# Patient Record
Sex: FEMALE | Race: WHITE
[De-identification: names, ages, dates, MRNs, and addresses within clinical notes are randomized per-mention and may not be internally consistent; named-entity substitution may affect disease eponyms.]

---

## 2017-10-24 ENCOUNTER — HOSPITAL ENCOUNTER (OUTPATIENT)
Dept: HOSPITAL 22 - LAB | Age: 62
End: 2017-10-24
Payer: COMMERCIAL

## 2017-10-24 DIAGNOSIS — M25.361: ICD-10-CM

## 2017-10-24 DIAGNOSIS — M15.4: ICD-10-CM

## 2017-10-24 DIAGNOSIS — M25.552: ICD-10-CM

## 2017-10-24 DIAGNOSIS — I10: Primary | ICD-10-CM

## 2017-10-24 LAB
BASOPHILS # BLD AUTO: 1.6 K/MM3 (ref 0.7–4.5)
BUN: 14 MG/DL (ref 7–18)
EOSINOPHIL NFR BLD AUTO: 22.2 % (ref 10–50)
GFR SERPLBLD BASED ON 1.73 SQ M-ARVRAT: 101 ML/MIN (ref 59–?)
HCT VFR BLD CALC: 15.2 G/DL (ref 12.2–16.2)

## 2017-10-25 NOTE — RADIOLOGY REPORT PS360
HIP LT 2-3V W/PELVIS IF PERFOR
 
HISTORY:    
LEFT HIP PAIN, INSTABILITY
ORDERING PHYSICIAN: Delphine VILLARRELA
PATIENT AGE:  62 years
 
 
COMPARISON: None
 
FINDINGS: No fracture or dislocation is evident. No significant degenerative
change. No lytic or blastic change. Unremarkable soft tissues. Degenerative disc
disease is present at L4-L5.
 
IMPRESSION:
1. Negative left hip.
2. Degenerative disc disease L4-L5

## 2017-10-25 NOTE — RADIOLOGY REPORT PS360
KNEE-3 VIEWS-RT***************
 
HISTORY:    
INSTABILITY OF RIGHT KNEE
ORDERING PHYSICIAN: Delphine VILLARREAL
PATIENT AGE:  62 years
 
 
COMPARISON: None
 
FINDINGS:    
 
No fracture or dislocation. No lytic or blastic change. Normal mineralization.
Minimal osteoarthritic changes are present at the medial compartment and
patellofemoral joint. 
No other significant findings
 
 
 
IMPRESSION: Minimal osteoarthritis

## 2017-10-25 NOTE — RADIOLOGY REPORT PS360
HIP LT 2-3V W/PELVIS IF PERFOR
 
HISTORY:    
LEFT HIP PAIN, INSTABILITY
ORDERING PHYSICIAN: Delphine VILLARREAL
PATIENT AGE:  62 years
 
 
COMPARISON: None
 
FINDINGS: No fracture or dislocation is evident. No significant degenerative
change. No lytic or blastic change. Unremarkable soft tissues. Degenerative disc
disease is present at L4-L5.
 
IMPRESSION:
1. Negative left hip.
2. Degenerative disc disease L4-L5

## 2017-10-26 LAB — RA LATEX TURBID.: <10 IU/ML (ref 0–13.9)

## 2019-05-14 ENCOUNTER — APPOINTMENT (OUTPATIENT)
Dept: GENERAL RADIOLOGY | Facility: HOSPITAL | Age: 64
End: 2019-05-14

## 2019-05-14 ENCOUNTER — HOSPITAL ENCOUNTER (EMERGENCY)
Facility: HOSPITAL | Age: 64
Discharge: ANOTHER HOSPITAL | End: 2019-05-14
Attending: EMERGENCY MEDICINE | Admitting: EMERGENCY MEDICINE

## 2019-05-14 ENCOUNTER — APPOINTMENT (OUTPATIENT)
Dept: CT IMAGING | Facility: HOSPITAL | Age: 64
End: 2019-05-14

## 2019-05-14 VITALS
HEART RATE: 68 BPM | HEIGHT: 66 IN | WEIGHT: 180 LBS | RESPIRATION RATE: 16 BRPM | DIASTOLIC BLOOD PRESSURE: 84 MMHG | TEMPERATURE: 97.8 F | OXYGEN SATURATION: 94 % | BODY MASS INDEX: 28.93 KG/M2 | SYSTOLIC BLOOD PRESSURE: 157 MMHG

## 2019-05-14 DIAGNOSIS — I74.2 ACUTE OCCLUSION OF ARTERY OF UPPER EXTREMITY (HCC): Primary | ICD-10-CM

## 2019-05-14 LAB
APTT PPP: 26.6 SECONDS (ref 85–120)
BASOPHILS # BLD AUTO: 0.02 10*3/MM3 (ref 0–0.2)
BASOPHILS NFR BLD AUTO: 0.2 % (ref 0–1.5)
DEPRECATED RDW RBC AUTO: 48.3 FL (ref 37–54)
EOSINOPHIL # BLD AUTO: 0.54 10*3/MM3 (ref 0–0.4)
EOSINOPHIL NFR BLD AUTO: 5.8 % (ref 0.3–6.2)
ERYTHROCYTE [DISTWIDTH] IN BLOOD BY AUTOMATED COUNT: 13.6 % (ref 12.3–15.4)
HCT VFR BLD AUTO: 45.7 % (ref 34–46.6)
HGB BLD-MCNC: 15.6 G/DL (ref 12–15.9)
HOLD SPECIMEN: NORMAL
HOLD SPECIMEN: NORMAL
IMM GRANULOCYTES # BLD AUTO: 0.02 10*3/MM3 (ref 0–0.05)
IMM GRANULOCYTES NFR BLD AUTO: 0.2 % (ref 0–0.5)
INR PPP: 1.07 (ref 0.85–1.16)
INR PPP: 1.1 (ref 0.8–1.2)
LYMPHOCYTES # BLD AUTO: 1.21 10*3/MM3 (ref 0.7–3.1)
LYMPHOCYTES NFR BLD AUTO: 13 % (ref 19.6–45.3)
MCH RBC QN AUTO: 33.4 PG (ref 26.6–33)
MCHC RBC AUTO-ENTMCNC: 34.1 G/DL (ref 31.5–35.7)
MCV RBC AUTO: 97.9 FL (ref 79–97)
MONOCYTES # BLD AUTO: 0.62 10*3/MM3 (ref 0.1–0.9)
MONOCYTES NFR BLD AUTO: 6.7 % (ref 5–12)
NEUTROPHILS # BLD AUTO: 6.89 10*3/MM3 (ref 1.7–7)
NEUTROPHILS NFR BLD AUTO: 74.3 % (ref 42.7–76)
PLATELET # BLD AUTO: 324 10*3/MM3 (ref 140–450)
PMV BLD AUTO: 10 FL (ref 6–12)
PROTHROMBIN TIME: 13.4 SECONDS (ref 11.2–14.3)
PROTHROMBIN TIME: 13.5 SECONDS (ref 12.8–15.2)
RBC # BLD AUTO: 4.67 10*6/MM3 (ref 3.77–5.28)
UFH PPP CHRO-ACNC: 0.1 IU/ML (ref 0.3–0.7)
WBC NRBC COR # BLD: 9.28 10*3/MM3 (ref 3.4–10.8)
WHOLE BLOOD HOLD SPECIMEN: NORMAL
WHOLE BLOOD HOLD SPECIMEN: NORMAL

## 2019-05-14 PROCEDURE — 85610 PROTHROMBIN TIME: CPT | Performed by: EMERGENCY MEDICINE

## 2019-05-14 PROCEDURE — 85730 THROMBOPLASTIN TIME PARTIAL: CPT | Performed by: EMERGENCY MEDICINE

## 2019-05-14 PROCEDURE — 93005 ELECTROCARDIOGRAM TRACING: CPT | Performed by: EMERGENCY MEDICINE

## 2019-05-14 PROCEDURE — 96376 TX/PRO/DX INJ SAME DRUG ADON: CPT

## 2019-05-14 PROCEDURE — 25010000002 HEPARIN (PORCINE) PER 1000 UNITS: Performed by: EMERGENCY MEDICINE

## 2019-05-14 PROCEDURE — 85014 HEMATOCRIT: CPT

## 2019-05-14 PROCEDURE — 70450 CT HEAD/BRAIN W/O DYE: CPT

## 2019-05-14 PROCEDURE — 80047 BASIC METABLC PNL IONIZED CA: CPT

## 2019-05-14 PROCEDURE — 85025 COMPLETE CBC W/AUTO DIFF WBC: CPT | Performed by: EMERGENCY MEDICINE

## 2019-05-14 PROCEDURE — 85610 PROTHROMBIN TIME: CPT

## 2019-05-14 PROCEDURE — 96365 THER/PROPH/DIAG IV INF INIT: CPT

## 2019-05-14 PROCEDURE — 71045 X-RAY EXAM CHEST 1 VIEW: CPT

## 2019-05-14 PROCEDURE — 85520 HEPARIN ASSAY: CPT | Performed by: EMERGENCY MEDICINE

## 2019-05-14 PROCEDURE — 25010000002 HEPARIN (PORCINE) IN NACL 25000-0.45 UT/250ML-% SOLUTION: Performed by: EMERGENCY MEDICINE

## 2019-05-14 PROCEDURE — 99285 EMERGENCY DEPT VISIT HI MDM: CPT

## 2019-05-14 RX ORDER — HEPARIN SODIUM 1000 [USP'U]/ML
40 INJECTION, SOLUTION INTRAVENOUS; SUBCUTANEOUS AS NEEDED
Status: DISCONTINUED | OUTPATIENT
Start: 2019-05-14 | End: 2019-05-14

## 2019-05-14 RX ORDER — HEPARIN SODIUM 1000 [USP'U]/ML
80 INJECTION, SOLUTION INTRAVENOUS; SUBCUTANEOUS ONCE
Status: COMPLETED | OUTPATIENT
Start: 2019-05-14 | End: 2019-05-14

## 2019-05-14 RX ORDER — SODIUM CHLORIDE 0.9 % (FLUSH) 0.9 %
10 SYRINGE (ML) INJECTION AS NEEDED
Status: DISCONTINUED | OUTPATIENT
Start: 2019-05-14 | End: 2019-05-14 | Stop reason: HOSPADM

## 2019-05-14 RX ADMIN — HEPARIN SODIUM 6530 UNITS: 1000 INJECTION, SOLUTION INTRAVENOUS; SUBCUTANEOUS at 18:22

## 2019-05-14 RX ADMIN — HEPARIN SODIUM 18 UNITS/KG/HR: 10000 INJECTION, SOLUTION INTRAVENOUS at 18:21

## 2019-05-14 NOTE — ED PROVIDER NOTES
Subjective   Lynda Thorne is a 63 y.o.female who presents to the emergency department with complaints of right upper extremity numbness and weakness. Around 1615 today, ten minutes before calling EMS, the patient says that her right arm started tingling and then became completely numb. She was unable to lift it off her bed. When EMS arrived to her home her right arm was flaccid, although no deficits were noted in her other extremities. EMS mention that her right arm felt cold to the touch. They also had a hard time locating a right radial pulse. En route to the hospital her sensation and strength slowly and spontaneously improved. She denies trouble with speech, swallowing, or gait. She has not had a recent cough, congestion, fever, or chills. She denies nausea, vomiting, diarrhea, abdominal pain, melena, hematochezia, hematuria, or hematemesis. She is not anticoagulated. There are no other acute complaints at this time.              History provided by:  Patient and EMS personnel  Neurologic Problem   The patient's primary symptoms include focal sensory loss, focal weakness and weakness. The patient's pertinent negatives include no loss of balance or slurred speech. This is a new problem. The current episode started today. The neurological problem developed suddenly. The last time the patient was known to be well was 5/14/2019 4:15 PM.  The problem has been gradually improving since onset. There was upper extremity and right-sided focality noted. Pertinent negatives include no abdominal pain, fever, nausea or vomiting. Past treatments include nothing. There is no history of a CVA.       Review of Systems   Constitutional: Negative for chills and fever.   HENT: Negative for congestion and trouble swallowing.    Respiratory: Negative for cough.    Gastrointestinal: Negative for abdominal pain, anal bleeding, blood in stool, diarrhea, nausea and vomiting.   Genitourinary: Negative for hematuria.   Musculoskeletal:  Negative for gait problem.   Neurological: Positive for focal weakness, weakness and numbness. Negative for speech difficulty and loss of balance.   All other systems reviewed and are negative.      Past Medical History:   Diagnosis Date   • Asthma    • Hypertension        No Known Allergies    History reviewed. No pertinent surgical history.    History reviewed. No pertinent family history.    Social History     Socioeconomic History   • Marital status:      Spouse name: Not on file   • Number of children: Not on file   • Years of education: Not on file   • Highest education level: Not on file   Tobacco Use   • Smoking status: Current Every Day Smoker         Objective   Physical Exam   Constitutional: She is oriented to person, place, and time. She appears well-developed and well-nourished.  Non-toxic appearance. No distress.   Appears anxious but non-toxic.   HENT:   Head: Normocephalic and atraumatic.   Mouth/Throat: Oropharynx is clear and moist.   Eyes: Conjunctivae are normal. No scleral icterus.   Neck: Normal range of motion. Neck supple.   Cardiovascular: Normal rate, regular rhythm and normal heart sounds.   No murmur heard.  Less than 1+ radial pulse on right, 3+ radial pulse on left.   Pulmonary/Chest: Effort normal and breath sounds normal. No respiratory distress.   Abdominal: Soft. There is no tenderness.   Musculoskeletal: She exhibits no edema.   Neurological: She is alert and oriented to person, place, and time. No cranial nerve deficit or sensory deficit. She exhibits normal muscle tone. Coordination normal.   Median, ulnar, and radial nerves are intact but subjective paresthesisa throughout entire right arm. No pronator drift.    Skin: Skin is warm and dry. No rash noted. No erythema.   Right upper extremity is cool as compared to the left upper extremity, especially in the hand and forearm. Entire right arm is pale as compared to left upper extremity.   Psychiatric: Her behavior is  normal. Her mood appears anxious.   Nursing note and vitals reviewed.      Critical Care  Performed by: Blayne Nino MD  Authorized by: Blayne Nino MD     Critical care provider statement:     Critical care time (minutes):  30    Critical care time was exclusive of:  Separately billable procedures and treating other patients    Critical care was necessary to treat or prevent imminent or life-threatening deterioration of the following conditions:  CNS failure or compromise and circulatory failure    Critical care was time spent personally by me on the following activities:  Evaluation of patient's response to treatment, examination of patient, obtaining history from patient or surrogate, ordering and performing treatments and interventions, ordering and review of laboratory studies, ordering and review of radiographic studies, re-evaluation of patient's condition, development of treatment plan with patient or surrogate, pulse oximetry and discussions with consultants               ED Course  ED Course as of May 15 0123   Tue May 14, 2019   1751 Dr. Nino spoke with Dr. Lucas, vascular at   [AS]      ED Course User Index  [AS] Beatriz Meyer     Recent Results (from the past 24 hour(s))   POC Protime / INR    Collection Time: 05/14/19  5:49 PM   Result Value Ref Range    Protime 13.5 12.8 - 15.2 seconds    INR 1.1 0.8 - 1.2   Light Blue Top    Collection Time: 05/14/19  5:55 PM   Result Value Ref Range    Extra Tube hold for add-on    Green Top (Gel)    Collection Time: 05/14/19  5:55 PM   Result Value Ref Range    Extra Tube Hold for add-ons.    Lavender Top    Collection Time: 05/14/19  5:55 PM   Result Value Ref Range    Extra Tube hold for add-on    Gold Top - SST    Collection Time: 05/14/19  5:55 PM   Result Value Ref Range    Extra Tube Hold for add-ons.    CBC Auto Differential    Collection Time: 05/14/19  5:55 PM   Result Value Ref Range    WBC 9.28 3.40 - 10.80 10*3/mm3    RBC 4.67 3.77 -  5.28 10*6/mm3    Hemoglobin 15.6 12.0 - 15.9 g/dL    Hematocrit 45.7 34.0 - 46.6 %    MCV 97.9 (H) 79.0 - 97.0 fL    MCH 33.4 (H) 26.6 - 33.0 pg    MCHC 34.1 31.5 - 35.7 g/dL    RDW 13.6 12.3 - 15.4 %    RDW-SD 48.3 37.0 - 54.0 fl    MPV 10.0 6.0 - 12.0 fL    Platelets 324 140 - 450 10*3/mm3    Neutrophil % 74.3 42.7 - 76.0 %    Lymphocyte % 13.0 (L) 19.6 - 45.3 %    Monocyte % 6.7 5.0 - 12.0 %    Eosinophil % 5.8 0.3 - 6.2 %    Basophil % 0.2 0.0 - 1.5 %    Immature Grans % 0.2 0.0 - 0.5 %    Neutrophils, Absolute 6.89 1.70 - 7.00 10*3/mm3    Lymphocytes, Absolute 1.21 0.70 - 3.10 10*3/mm3    Monocytes, Absolute 0.62 0.10 - 0.90 10*3/mm3    Eosinophils, Absolute 0.54 (H) 0.00 - 0.40 10*3/mm3    Basophils, Absolute 0.02 0.00 - 0.20 10*3/mm3    Immature Grans, Absolute 0.02 0.00 - 0.05 10*3/mm3   Protime-INR    Collection Time: 05/14/19  5:55 PM   Result Value Ref Range    Protime 13.4 11.2 - 14.3 Seconds    INR 1.07 0.85 - 1.16   aPTT    Collection Time: 05/14/19  5:55 PM   Result Value Ref Range    PTT 26.6 (L) 85.0 - 120.0 seconds   Heparin Anti-Xa    Collection Time: 05/14/19  5:55 PM   Result Value Ref Range    Heparin Anti-Xa (UFH) 0.10 (L) 0.30 - 0.70 IU/ml     Note: In addition to lab results from this visit, the labs listed above may include labs taken at another facility or during a different encounter within the last 24 hours. Please correlate lab times with ED admission and discharge times for further clarification of the services performed during this visit.    XR Chest 1 View   Preliminary Result   Chronic changes of the lungs without acute cardiopulmonary   process.              CT Head Without Contrast Stroke Protocol   Preliminary Result   No acute intracranial abnormality.       Scan performed on 05/14/2019 at 1743 hours. Scan report given to ER   physician and team on 05/14/2019 at 1745 hours.                Vitals:    05/14/19 1845 05/14/19 1847 05/14/19 1859 05/14/19 1900   BP:    157/84   Pulse:  70 71 69 68   Resp:       Temp:       TempSrc:       SpO2: 97% 95% 94% 94%   Weight:       Height:         Medications   heparin (porcine) injection 6,530 Units (6,530 Units Intravenous Given 5/14/19 1822)     ECG/EMG Results (last 24 hours)     ** No results found for the last 24 hours. **        ECG 12 Lead   Final Result   Test Reason : Stroke Protocol (Onset > 12 Hrs)   Blood Pressure : **/** mmHG   Vent. Rate : 071 BPM     Atrial Rate : 071 BPM      P-R Int : 178 ms          QRS Dur : 102 ms       QT Int : 426 ms       P-R-T Axes : 082 -37 065 degrees      QTc Int : 462 ms      Normal sinus rhythm   Left axis deviation   Abnormal ECG   No previous ECGs available   Confirmed by SUDHAKAR MCMAHAN MD ([id number]) on 5/14/2019 6:58:40 PM      Referred By:  bhavin           Confirmed By:SUDHAKAR MCMAHAN MD                         MDM  Number of Diagnoses or Management Options  Acute occlusion of artery of upper extremity (CMS/HCC):      Amount and/or Complexity of Data Reviewed  Clinical lab tests: reviewed  Tests in the radiology section of CPT®: reviewed  Tests in the medicine section of CPT®: reviewed    Critical Care  Total time providing critical care: 30-74 minutes      Final diagnoses:   Acute occlusion of artery of upper extremity (CMS/HCC)       Documentation assistance provided by leonor Meyer.  Information recorded by the scribe was done at my direction and has been verified and validated by me.     Beatriz Meyer  05/14/19 0013       Beatriz Meyer  05/14/19 3441       Sudhakar Mcmahan MD  05/15/19 0126

## 2019-05-15 LAB
BUN BLDA-MCNC: 15 MG/DL (ref 8–26)
CA-I BLDA-SCNC: 1.28 MMOL/L (ref 1.2–1.32)
CHLORIDE BLDA-SCNC: 94 MMOL/L (ref 98–109)
CO2 BLDA-SCNC: 32 MMOL/L (ref 24–29)
CREAT BLDA-MCNC: 0.9 MG/DL (ref 0.6–1.3)
GLUCOSE BLDC GLUCOMTR-MCNC: 106 MG/DL (ref 70–130)
HCT VFR BLDA CALC: 45 % (ref 38–51)
HGB BLDA-MCNC: 15.3 G/DL (ref 12–17)
POTASSIUM BLDA-SCNC: 3.7 MMOL/L (ref 3.5–4.9)
SODIUM BLDA-SCNC: 141 MMOL/L (ref 138–146)

## 2020-01-01 ENCOUNTER — APPOINTMENT (OUTPATIENT)
Dept: MRI IMAGING | Facility: HOSPITAL | Age: 65
End: 2020-01-01

## 2020-01-01 ENCOUNTER — APPOINTMENT (OUTPATIENT)
Dept: OTHER | Facility: HOSPITAL | Age: 65
End: 2020-01-01

## 2020-01-01 ENCOUNTER — APPOINTMENT (OUTPATIENT)
Dept: CARDIOLOGY | Facility: HOSPITAL | Age: 65
End: 2020-01-01

## 2020-01-01 ENCOUNTER — READMISSION MANAGEMENT (OUTPATIENT)
Dept: CALL CENTER | Facility: HOSPITAL | Age: 65
End: 2020-01-01

## 2020-01-01 ENCOUNTER — TELEPHONE (OUTPATIENT)
Dept: CARDIOLOGY | Facility: CLINIC | Age: 65
End: 2020-01-01

## 2020-01-01 ENCOUNTER — LAB (OUTPATIENT)
Dept: PULMONOLOGY | Facility: CLINIC | Age: 65
End: 2020-01-01

## 2020-01-01 ENCOUNTER — CONSULT (OUTPATIENT)
Dept: ONCOLOGY | Facility: CLINIC | Age: 65
End: 2020-01-01

## 2020-01-01 ENCOUNTER — TELEPHONE (OUTPATIENT)
Dept: NEUROLOGY | Facility: CLINIC | Age: 65
End: 2020-01-01

## 2020-01-01 ENCOUNTER — OFFICE VISIT (OUTPATIENT)
Dept: NEUROSURGERY | Facility: CLINIC | Age: 65
End: 2020-01-01

## 2020-01-01 ENCOUNTER — OFFICE VISIT (OUTPATIENT)
Dept: CARDIOLOGY | Facility: CLINIC | Age: 65
End: 2020-01-01

## 2020-01-01 ENCOUNTER — CALL CENTER PROGRAMS (OUTPATIENT)
Dept: CALL CENTER | Facility: HOSPITAL | Age: 65
End: 2020-01-01

## 2020-01-01 ENCOUNTER — HOSPITAL ENCOUNTER (OUTPATIENT)
Dept: CT IMAGING | Facility: HOSPITAL | Age: 65
Discharge: HOME | End: 2020-08-19
Admitting: INTERNAL MEDICINE

## 2020-01-01 ENCOUNTER — OFFICE VISIT (OUTPATIENT)
Dept: PULMONOLOGY | Facility: CLINIC | Age: 65
End: 2020-01-01

## 2020-01-01 ENCOUNTER — APPOINTMENT (OUTPATIENT)
Dept: GENERAL RADIOLOGY | Facility: HOSPITAL | Age: 65
End: 2020-01-01

## 2020-01-01 ENCOUNTER — APPOINTMENT (OUTPATIENT)
Dept: CT IMAGING | Facility: HOSPITAL | Age: 65
End: 2020-01-01

## 2020-01-01 ENCOUNTER — HOSPITAL ENCOUNTER (OUTPATIENT)
Facility: HOSPITAL | Age: 65
Setting detail: OBSERVATION
LOS: 1 days | Discharge: HOME | End: 2020-12-21
Attending: INTERNAL MEDICINE | Admitting: INTERNAL MEDICINE

## 2020-01-01 ENCOUNTER — HOSPITAL ENCOUNTER (INPATIENT)
Facility: HOSPITAL | Age: 65
LOS: 4 days | Discharge: HOME HEALTH | End: 2020-06-27
Attending: FAMILY MEDICINE | Admitting: INTERNAL MEDICINE

## 2020-01-01 VITALS
BODY MASS INDEX: 27.4 KG/M2 | HEART RATE: 98 BPM | DIASTOLIC BLOOD PRESSURE: 92 MMHG | SYSTOLIC BLOOD PRESSURE: 118 MMHG | HEIGHT: 64 IN | WEIGHT: 160.5 LBS | RESPIRATION RATE: 18 BRPM | OXYGEN SATURATION: 91 % | TEMPERATURE: 97.6 F

## 2020-01-01 VITALS
HEIGHT: 66 IN | HEART RATE: 88 BPM | SYSTOLIC BLOOD PRESSURE: 125 MMHG | WEIGHT: 170 LBS | BODY MASS INDEX: 27.32 KG/M2 | OXYGEN SATURATION: 96 % | RESPIRATION RATE: 18 BRPM | DIASTOLIC BLOOD PRESSURE: 95 MMHG | TEMPERATURE: 98.1 F

## 2020-01-01 VITALS
SYSTOLIC BLOOD PRESSURE: 132 MMHG | TEMPERATURE: 98.2 F | OXYGEN SATURATION: 90 % | HEART RATE: 72 BPM | DIASTOLIC BLOOD PRESSURE: 80 MMHG | BODY MASS INDEX: 27.86 KG/M2 | WEIGHT: 167.2 LBS | HEIGHT: 65 IN

## 2020-01-01 VITALS
BODY MASS INDEX: 27.32 KG/M2 | SYSTOLIC BLOOD PRESSURE: 132 MMHG | WEIGHT: 170 LBS | OXYGEN SATURATION: 95 % | HEART RATE: 73 BPM | HEIGHT: 66 IN | DIASTOLIC BLOOD PRESSURE: 82 MMHG

## 2020-01-01 VITALS
SYSTOLIC BLOOD PRESSURE: 147 MMHG | WEIGHT: 168 LBS | HEART RATE: 67 BPM | BODY MASS INDEX: 27 KG/M2 | DIASTOLIC BLOOD PRESSURE: 93 MMHG | HEIGHT: 66 IN | RESPIRATION RATE: 16 BRPM | OXYGEN SATURATION: 94 % | TEMPERATURE: 97.5 F

## 2020-01-01 VITALS
HEIGHT: 66 IN | TEMPERATURE: 96.4 F | DIASTOLIC BLOOD PRESSURE: 90 MMHG | BODY MASS INDEX: 27.32 KG/M2 | WEIGHT: 170 LBS | HEART RATE: 85 BPM | OXYGEN SATURATION: 90 % | SYSTOLIC BLOOD PRESSURE: 140 MMHG

## 2020-01-01 VITALS
BODY MASS INDEX: 26.52 KG/M2 | WEIGHT: 165 LBS | SYSTOLIC BLOOD PRESSURE: 150 MMHG | TEMPERATURE: 98 F | DIASTOLIC BLOOD PRESSURE: 78 MMHG | HEIGHT: 66 IN

## 2020-01-01 VITALS
SYSTOLIC BLOOD PRESSURE: 124 MMHG | WEIGHT: 162 LBS | HEART RATE: 73 BPM | DIASTOLIC BLOOD PRESSURE: 80 MMHG | HEIGHT: 64 IN | BODY MASS INDEX: 27.66 KG/M2

## 2020-01-01 VITALS
DIASTOLIC BLOOD PRESSURE: 80 MMHG | HEIGHT: 65 IN | TEMPERATURE: 98 F | HEART RATE: 86 BPM | SYSTOLIC BLOOD PRESSURE: 132 MMHG | WEIGHT: 165.8 LBS | OXYGEN SATURATION: 94 % | BODY MASS INDEX: 27.63 KG/M2

## 2020-01-01 DIAGNOSIS — G47.34 NOCTURNAL HYPOXIA: ICD-10-CM

## 2020-01-01 DIAGNOSIS — J43.2 CENTRILOBULAR EMPHYSEMA (HCC): Primary | ICD-10-CM

## 2020-01-01 DIAGNOSIS — R91.1 NODULE OF UPPER LOBE OF LEFT LUNG: ICD-10-CM

## 2020-01-01 DIAGNOSIS — J44.1 COPD WITH ACUTE EXACERBATION (HCC): ICD-10-CM

## 2020-01-01 DIAGNOSIS — I63.9 ACUTE CVA (CEREBROVASCULAR ACCIDENT) (HCC): ICD-10-CM

## 2020-01-01 DIAGNOSIS — R47.1 DYSARTHRIA: ICD-10-CM

## 2020-01-01 DIAGNOSIS — I48.0 PAROXYSMAL ATRIAL FIBRILLATION (HCC): Primary | ICD-10-CM

## 2020-01-01 DIAGNOSIS — R47.01 APHASIA: ICD-10-CM

## 2020-01-01 DIAGNOSIS — I63.9 ACUTE CVA (CEREBROVASCULAR ACCIDENT) (HCC): Primary | ICD-10-CM

## 2020-01-01 DIAGNOSIS — I10 ESSENTIAL HYPERTENSION: ICD-10-CM

## 2020-01-01 DIAGNOSIS — Z79.01 CHRONIC ANTICOAGULATION: Primary | ICD-10-CM

## 2020-01-01 DIAGNOSIS — Z00.6 EXAMINATION FOR NORMAL COMPARISON FOR CLINICAL RESEARCH: ICD-10-CM

## 2020-01-01 DIAGNOSIS — I48.0 PAROXYSMAL ATRIAL FIBRILLATION (HCC): ICD-10-CM

## 2020-01-01 DIAGNOSIS — Z78.9 IMPAIRED MOBILITY AND ADLS: Primary | ICD-10-CM

## 2020-01-01 DIAGNOSIS — I63.40 CEREBROVASCULAR ACCIDENT (CVA) DUE TO EMBOLISM OF CEREBRAL ARTERY (HCC): ICD-10-CM

## 2020-01-01 DIAGNOSIS — Z74.09 IMPAIRED MOBILITY AND ADLS: Primary | ICD-10-CM

## 2020-01-01 DIAGNOSIS — Z86.718 HISTORY OF BLOOD CLOTS: Primary | ICD-10-CM

## 2020-01-01 DIAGNOSIS — R06.00 DYSPNEA, UNSPECIFIED TYPE: ICD-10-CM

## 2020-01-01 DIAGNOSIS — I63.019 CEREBROVASCULAR ACCIDENT (CVA) DUE TO THROMBOSIS OF VERTEBRAL ARTERY, UNSPECIFIED BLOOD VESSEL LATERALITY (HCC): Primary | ICD-10-CM

## 2020-01-01 DIAGNOSIS — Z79.01 CHRONIC ANTICOAGULATION: ICD-10-CM

## 2020-01-01 DIAGNOSIS — I63.412 CEREBROVASCULAR ACCIDENT (CVA) DUE TO EMBOLISM OF LEFT MIDDLE CEREBRAL ARTERY (HCC): ICD-10-CM

## 2020-01-01 DIAGNOSIS — J96.01 ACUTE RESPIRATORY FAILURE WITH HYPOXIA (HCC): ICD-10-CM

## 2020-01-01 DIAGNOSIS — R53.1 WEAKNESS: ICD-10-CM

## 2020-01-01 LAB
ALBUMIN SERPL-MCNC: 3.1 G/DL (ref 3.5–5.2)
ALBUMIN SERPL-MCNC: 3.2 G/DL (ref 3.5–5.2)
ALBUMIN SERPL-MCNC: 3.8 G/DL (ref 3.5–5.2)
ALBUMIN/GLOB SERPL: 0.8 G/DL
ALBUMIN/GLOB SERPL: 1.1 G/DL
ALP SERPL-CCNC: 393 U/L (ref 39–117)
ALP SERPL-CCNC: 72 U/L (ref 39–117)
ALT SERPL W P-5'-P-CCNC: 17 U/L (ref 1–33)
ALT SERPL W P-5'-P-CCNC: 258 U/L (ref 1–33)
ANA SER QL: NEGATIVE
ANION GAP SERPL CALCULATED.3IONS-SCNC: 13 MMOL/L (ref 5–15)
ANION GAP SERPL CALCULATED.3IONS-SCNC: 7 MMOL/L (ref 5–15)
ANION GAP SERPL CALCULATED.3IONS-SCNC: 8 MMOL/L (ref 5–15)
ANTI-PHOSPHATIDIC ACID: NORMAL
ANTI-PHOSPHATIDYL GLYCEROL: NORMAL
ANTI-PHOSPHATIDYL INOSITOL: NORMAL
ANTI-PHOSPHATIDYLETHANOLAMINE: NORMAL
APTT SCREEN TO CONFIRM RATIO: 1.09 RATIO (ref 0–1.4)
AST SERPL-CCNC: 146 U/L (ref 1–32)
AST SERPL-CCNC: 19 U/L (ref 1–32)
AT III ACT/NOR PPP CHRO: 102 % (ref 75–135)
B2 GLYCOPROT1 IGA SER-ACNC: <9 GPI IGA UNITS (ref 0–25)
B2 GLYCOPROT1 IGG SER-ACNC: <9 GPI IGG UNITS (ref 0–20)
B2 GLYCOPROT1 IGM SER-ACNC: <9 GPI IGM UNITS (ref 0–32)
BACTERIA SPEC AEROBE CULT: NORMAL
BACTERIA UR QL AUTO: ABNORMAL /HPF
BACTERIA UR QL AUTO: NORMAL /HPF
BH CV ECHO MEAS - AO ROOT AREA (BSA CORRECTED): 1.6
BH CV ECHO MEAS - AO ROOT AREA: 7.4 CM^2
BH CV ECHO MEAS - AO ROOT DIAM: 3.1 CM
BH CV ECHO MEAS - BSA(HAYCOCK): 1.9 M^2
BH CV ECHO MEAS - BSA: 1.9 M^2
BH CV ECHO MEAS - BZI_BMI: 27.6 KILOGRAMS/M^2
BH CV ECHO MEAS - BZI_METRIC_HEIGHT: 167 CM
BH CV ECHO MEAS - BZI_METRIC_WEIGHT: 77.1 KG
BH CV ECHO MEAS - EDV(CUBED): 175.5 ML
BH CV ECHO MEAS - EDV(MOD-SP2): 57 ML
BH CV ECHO MEAS - EDV(MOD-SP4): 71.7 ML
BH CV ECHO MEAS - EDV(TEICH): 153.6 ML
BH CV ECHO MEAS - EF(CUBED): 50.1 %
BH CV ECHO MEAS - EF(MOD-BP): 48.5 %
BH CV ECHO MEAS - EF(MOD-SP2): 46.8 %
BH CV ECHO MEAS - EF(MOD-SP4): 52.7 %
BH CV ECHO MEAS - EF(TEICH): 41.7 %
BH CV ECHO MEAS - ESV(CUBED): 87.5 ML
BH CV ECHO MEAS - ESV(MOD-SP2): 30.3 ML
BH CV ECHO MEAS - ESV(MOD-SP4): 33.9 ML
BH CV ECHO MEAS - ESV(TEICH): 89.6 ML
BH CV ECHO MEAS - FS: 20.7 %
BH CV ECHO MEAS - IVS/LVPW: 1.3
BH CV ECHO MEAS - IVSD: 1.1 CM
BH CV ECHO MEAS - LA DIMENSION: 4.1 CM
BH CV ECHO MEAS - LA/AO: 1.3
BH CV ECHO MEAS - LAD MAJOR: 4.8 CM
BH CV ECHO MEAS - LAT PEAK E' VEL: 9.7 CM/SEC
BH CV ECHO MEAS - LATERAL E/E' RATIO: 12.3
BH CV ECHO MEAS - LV DIASTOLIC VOL/BSA (35-75): 38.5 ML/M^2
BH CV ECHO MEAS - LV MASS(C)D: 200.9 GRAMS
BH CV ECHO MEAS - LV MASS(C)DI: 107.9 GRAMS/M^2
BH CV ECHO MEAS - LV MAX PG: 1.8 MMHG
BH CV ECHO MEAS - LV MEAN PG: 1 MMHG
BH CV ECHO MEAS - LV SYSTOLIC VOL/BSA (12-30): 18.2 ML/M^2
BH CV ECHO MEAS - LV V1 MAX: 67.8 CM/SEC
BH CV ECHO MEAS - LV V1 MEAN: 47 CM/SEC
BH CV ECHO MEAS - LV V1 VTI: 14.4 CM
BH CV ECHO MEAS - LVIDD: 5.6 CM
BH CV ECHO MEAS - LVIDS: 4.4 CM
BH CV ECHO MEAS - LVLD AP2: 6.4 CM
BH CV ECHO MEAS - LVLD AP4: 7.4 CM
BH CV ECHO MEAS - LVLS AP2: 5.1 CM
BH CV ECHO MEAS - LVLS AP4: 5.4 CM
BH CV ECHO MEAS - LVOT AREA (M): 3.8 CM^2
BH CV ECHO MEAS - LVOT AREA: 3.9 CM^2
BH CV ECHO MEAS - LVOT DIAM: 2.2 CM
BH CV ECHO MEAS - LVPWD: 0.81 CM
BH CV ECHO MEAS - MED PEAK E' VEL: 9.7 CM/SEC
BH CV ECHO MEAS - MEDIAL E/E' RATIO: 12.3
BH CV ECHO MEAS - MV DEC TIME: 0.12 SEC
BH CV ECHO MEAS - MV E MAX VEL: 119 CM/SEC
BH CV ECHO MEAS - MV MAX PG: 5.8 MMHG
BH CV ECHO MEAS - MV MEAN PG: 2.3 MMHG
BH CV ECHO MEAS - MV V2 MAX: 120.3 CM/SEC
BH CV ECHO MEAS - MV V2 MEAN: 65.1 CM/SEC
BH CV ECHO MEAS - MV V2 VTI: 20.9 CM
BH CV ECHO MEAS - MVA(VTI): 2.7 CM^2
BH CV ECHO MEAS - PA ACC TIME: 0.09 SEC
BH CV ECHO MEAS - PA PR(ACCEL): 39.4 MMHG
BH CV ECHO MEAS - RAP SYSTOLE: 8 MMHG
BH CV ECHO MEAS - RVSP: 37 MMHG
BH CV ECHO MEAS - SI(CUBED): 47.3 ML/M^2
BH CV ECHO MEAS - SI(LVOT): 29.9 ML/M^2
BH CV ECHO MEAS - SI(MOD-SP2): 14.3 ML/M^2
BH CV ECHO MEAS - SI(MOD-SP4): 20.3 ML/M^2
BH CV ECHO MEAS - SI(TEICH): 34.4 ML/M^2
BH CV ECHO MEAS - SV(CUBED): 88 ML
BH CV ECHO MEAS - SV(LVOT): 55.6 ML
BH CV ECHO MEAS - SV(MOD-SP2): 26.7 ML
BH CV ECHO MEAS - SV(MOD-SP4): 37.8 ML
BH CV ECHO MEAS - SV(TEICH): 64 ML
BH CV ECHO MEAS - TR MAX PG: 29 MMHG
BH CV ECHO MEAS - TR MAX VEL: 271.5 CM/SEC
BH CV ECHO MEASUREMENTS AVERAGE E/E' RATIO: 12.27
BH CV VAS BP LEFT ARM: NORMAL MMHG
BH CV XLRA - RV BASE: 4.1 CM
BH CV XLRA - RV LENGTH: 4.1 CM
BH CV XLRA - RV MID: 2.9 CM
BH CV XLRA - TDI S': 8.7 CM/SEC
BH CV XLRA MEAS LEFT CCA RATIO VEL: 42 CM/SEC
BH CV XLRA MEAS LEFT DIST CCA EDV: 10.4 CM/SEC
BH CV XLRA MEAS LEFT DIST CCA PSV: 37.7 CM/SEC
BH CV XLRA MEAS LEFT DIST ICA EDV: 19.9 CM/SEC
BH CV XLRA MEAS LEFT DIST ICA PSV: 49 CM/SEC
BH CV XLRA MEAS LEFT ICA RATIO VEL: 34.7 CM/SEC
BH CV XLRA MEAS LEFT ICA/CCA RATIO: 0.83
BH CV XLRA MEAS LEFT MID CCA EDV: 11.8 CM/SEC
BH CV XLRA MEAS LEFT MID CCA PSV: 42.4 CM/SEC
BH CV XLRA MEAS LEFT MID ICA EDV: 10.8 CM/SEC
BH CV XLRA MEAS LEFT MID ICA PSV: 34.9 CM/SEC
BH CV XLRA MEAS LEFT PROX CCA EDV: 8.6 CM/SEC
BH CV XLRA MEAS LEFT PROX CCA PSV: 52.6 CM/SEC
BH CV XLRA MEAS LEFT PROX ECA EDV: 7 CM/SEC
BH CV XLRA MEAS LEFT PROX ECA PSV: 42.5 CM/SEC
BH CV XLRA MEAS LEFT PROX ICA EDV: 10.3 CM/SEC
BH CV XLRA MEAS LEFT PROX ICA PSV: 33.4 CM/SEC
BH CV XLRA MEAS LEFT PROX SCLA PSV: 91.8 CM/SEC
BH CV XLRA MEAS LEFT VERTEBRAL A EDV: 16.2 CM/SEC
BH CV XLRA MEAS LEFT VERTEBRAL A PSV: 38.2 CM/SEC
BH CV XLRA MEAS RIGHT CCA RATIO VEL: 37.4 CM/SEC
BH CV XLRA MEAS RIGHT DIST CCA EDV: 7.5 CM/SEC
BH CV XLRA MEAS RIGHT DIST CCA PSV: 35.2 CM/SEC
BH CV XLRA MEAS RIGHT DIST ICA EDV: 15 CM/SEC
BH CV XLRA MEAS RIGHT DIST ICA PSV: 40.9 CM/SEC
BH CV XLRA MEAS RIGHT ICA RATIO VEL: 41.8 CM/SEC
BH CV XLRA MEAS RIGHT ICA/CCA RATIO: 1.1
BH CV XLRA MEAS RIGHT MID CCA EDV: 5.7 CM/SEC
BH CV XLRA MEAS RIGHT MID CCA PSV: 37.7 CM/SEC
BH CV XLRA MEAS RIGHT MID ICA EDV: 15.3 CM/SEC
BH CV XLRA MEAS RIGHT MID ICA PSV: 42 CM/SEC
BH CV XLRA MEAS RIGHT PROX CCA EDV: 11.9 CM/SEC
BH CV XLRA MEAS RIGHT PROX CCA PSV: 46.8 CM/SEC
BH CV XLRA MEAS RIGHT PROX ECA EDV: 5.8 CM/SEC
BH CV XLRA MEAS RIGHT PROX ECA PSV: 44 CM/SEC
BH CV XLRA MEAS RIGHT PROX ICA EDV: 11.7 CM/SEC
BH CV XLRA MEAS RIGHT PROX ICA PSV: 37.7 CM/SEC
BH CV XLRA MEAS RIGHT PROX SCLA PSV: 60.4 CM/SEC
BH CV XLRA MEAS RIGHT VERTEBRAL A EDV: 2.6 CM/SEC
BH CV XLRA MEAS RIGHT VERTEBRAL A PSV: 8.8 CM/SEC
BILIRUB SERPL-MCNC: 0.5 MG/DL (ref 0–1.2)
BILIRUB SERPL-MCNC: 0.8 MG/DL (ref 0.2–1.2)
BILIRUB UR QL STRIP: NEGATIVE
BILIRUB UR QL STRIP: NEGATIVE
BUN BLD-MCNC: 11 MG/DL (ref 8–23)
BUN BLD-MCNC: 13 MG/DL (ref 8–23)
BUN BLD-MCNC: 15 MG/DL (ref 8–23)
BUN BLD-MCNC: 8 MG/DL (ref 8–23)
BUN BLD-MCNC: 8 MG/DL (ref 8–23)
BUN SERPL-MCNC: 14 MG/DL (ref 8–23)
BUN/CREAT SERPL: 11.8 (ref 7–25)
BUN/CREAT SERPL: 12.1 (ref 7–25)
BUN/CREAT SERPL: 19 (ref 7–25)
BUN/CREAT SERPL: 22.1 (ref 7–25)
BUN/CREAT SERPL: 23.6 (ref 7–25)
BUN/CREAT SERPL: 24.1 (ref 7–25)
CALCIUM SPEC-SCNC: 8.8 MG/DL (ref 8.6–10.5)
CALCIUM SPEC-SCNC: 9.4 MG/DL (ref 8.6–10.5)
CALCIUM SPEC-SCNC: 9.5 MG/DL (ref 8.6–10.5)
CALCIUM SPEC-SCNC: 9.7 MG/DL (ref 8.6–10.5)
CALCIUM SPEC-SCNC: 9.7 MG/DL (ref 8.6–10.5)
CALCIUM SPEC-SCNC: 9.9 MG/DL (ref 8.6–10.5)
CARDIOLIPIN IGG SER IA-ACNC: <9 GPL U/ML (ref 0–14)
CARDIOLIPIN IGM SER IA-ACNC: 12 MPL U/ML (ref 0–12)
CHLORIDE SERPL-SCNC: 101 MMOL/L (ref 98–107)
CHLORIDE SERPL-SCNC: 103 MMOL/L (ref 98–107)
CHLORIDE SERPL-SCNC: 105 MMOL/L (ref 98–107)
CHLORIDE SERPL-SCNC: 96 MMOL/L (ref 98–107)
CHLORIDE SERPL-SCNC: 97 MMOL/L (ref 98–107)
CHLORIDE SERPL-SCNC: 97 MMOL/L (ref 98–107)
CHOLEST SERPL-MCNC: 101 MG/DL (ref 0–200)
CHOLEST SERPL-MCNC: 85 MG/DL (ref 0–200)
CLARITY UR: ABNORMAL
CLARITY UR: ABNORMAL
CO2 SERPL-SCNC: 25 MMOL/L (ref 22–29)
CO2 SERPL-SCNC: 26 MMOL/L (ref 22–29)
CO2 SERPL-SCNC: 27 MMOL/L (ref 22–29)
CO2 SERPL-SCNC: 28 MMOL/L (ref 22–29)
CO2 SERPL-SCNC: 29 MMOL/L (ref 22–29)
CO2 SERPL-SCNC: 32 MMOL/L (ref 22–29)
COLOR UR: ABNORMAL
COLOR UR: YELLOW
CONFIRM APTT/NORMAL: 52.2 SEC (ref 0–55)
CREAT BLD-MCNC: 0.55 MG/DL (ref 0.57–1)
CREAT BLD-MCNC: 0.58 MG/DL (ref 0.57–1)
CREAT BLD-MCNC: 0.66 MG/DL (ref 0.57–1)
CREAT BLD-MCNC: 0.68 MG/DL (ref 0.57–1)
CREAT BLD-MCNC: 0.68 MG/DL (ref 0.57–1)
CREAT SERPL-MCNC: 0.58 MG/DL (ref 0.57–1)
DEPRECATED RDW RBC AUTO: 45.4 FL (ref 37–54)
DEPRECATED RDW RBC AUTO: 45.5 FL (ref 37–54)
DEPRECATED RDW RBC AUTO: 46.7 FL (ref 37–54)
DEPRECATED RDW RBC AUTO: 49.2 FL (ref 37–54)
DRVVT SCREEN TO CONFIRM RATIO: 1.3 RATIO (ref 0.8–1.2)
ERYTHROCYTE [DISTWIDTH] IN BLOOD BY AUTOMATED COUNT: 12.6 % (ref 12.3–15.4)
ERYTHROCYTE [DISTWIDTH] IN BLOOD BY AUTOMATED COUNT: 12.6 % (ref 12.3–15.4)
ERYTHROCYTE [DISTWIDTH] IN BLOOD BY AUTOMATED COUNT: 12.7 % (ref 12.3–15.4)
ERYTHROCYTE [DISTWIDTH] IN BLOOD BY AUTOMATED COUNT: 13.4 % (ref 12.3–15.4)
F5 GENE MUT ANL BLD/T: NORMAL
FACT V ACT/NOR PPP: 62 % (ref 70–150)
FACTOR II, DNA ANALYSIS: NORMAL
FLUAV RNA RESP QL NAA+PROBE: NOT DETECTED
FLUBV RNA RESP QL NAA+PROBE: NOT DETECTED
GFR SERPL CREATININE-BSD FRML MDRD: 104 ML/MIN/1.73
GFR SERPL CREATININE-BSD FRML MDRD: 104 ML/MIN/1.73
GFR SERPL CREATININE-BSD FRML MDRD: 111 ML/MIN/1.73
GFR SERPL CREATININE-BSD FRML MDRD: 87 ML/MIN/1.73
GFR SERPL CREATININE-BSD FRML MDRD: 87 ML/MIN/1.73
GFR SERPL CREATININE-BSD FRML MDRD: 90 ML/MIN/1.73
GLOBULIN UR ELPH-MCNC: 3.4 GM/DL
GLOBULIN UR ELPH-MCNC: 4 GM/DL
GLUCOSE BLD-MCNC: 113 MG/DL (ref 65–99)
GLUCOSE BLD-MCNC: 129 MG/DL (ref 65–99)
GLUCOSE BLD-MCNC: 131 MG/DL (ref 65–99)
GLUCOSE BLD-MCNC: 71 MG/DL (ref 65–99)
GLUCOSE BLD-MCNC: 87 MG/DL (ref 65–99)
GLUCOSE BLDC GLUCOMTR-MCNC: 70 MG/DL (ref 70–130)
GLUCOSE BLDC GLUCOMTR-MCNC: 81 MG/DL (ref 70–130)
GLUCOSE BLDC GLUCOMTR-MCNC: 83 MG/DL (ref 70–130)
GLUCOSE BLDC GLUCOMTR-MCNC: 90 MG/DL (ref 70–130)
GLUCOSE BLDC GLUCOMTR-MCNC: 96 MG/DL (ref 70–130)
GLUCOSE BLDC GLUCOMTR-MCNC: 98 MG/DL (ref 70–130)
GLUCOSE SERPL-MCNC: 92 MG/DL (ref 65–99)
GLUCOSE UR STRIP-MCNC: ABNORMAL MG/DL
GLUCOSE UR STRIP-MCNC: NEGATIVE MG/DL
HBA1C MFR BLD: 6 % (ref 4.8–5.6)
HBA1C MFR BLD: 6.3 % (ref 4.8–5.6)
HCT VFR BLD AUTO: 43.2 % (ref 34–46.6)
HCT VFR BLD AUTO: 44.7 % (ref 34–46.6)
HCT VFR BLD AUTO: 47.4 % (ref 34–46.6)
HCT VFR BLD AUTO: 48 % (ref 34–46.6)
HCYS SERPL-MCNC: 10.9 UMOL/L (ref 0–15)
HDLC SERPL-MCNC: 29 MG/DL (ref 40–60)
HDLC SERPL-MCNC: 55 MG/DL (ref 40–60)
HGB BLD-MCNC: 13.5 G/DL (ref 12–15.9)
HGB BLD-MCNC: 14.7 G/DL (ref 12–15.9)
HGB BLD-MCNC: 15.7 G/DL (ref 12–15.9)
HGB BLD-MCNC: 15.9 G/DL (ref 12–15.9)
HGB UR QL STRIP.AUTO: ABNORMAL
HGB UR QL STRIP.AUTO: NEGATIVE
HYALINE CASTS UR QL AUTO: ABNORMAL /LPF
HYALINE CASTS UR QL AUTO: NORMAL /LPF
KETONES UR QL STRIP: ABNORMAL
KETONES UR QL STRIP: NEGATIVE
LA 2 SCREEN W REFLEX-IMP: ABNORMAL
LDLC SERPL CALC-MCNC: 33 MG/DL (ref 0–100)
LDLC SERPL CALC-MCNC: 44 MG/DL (ref 0–100)
LDLC/HDLC SERPL: 0.64 {RATIO}
LDLC/HDLC SERPL: 1.53 {RATIO}
LEFT ATRIUM VOLUME INDEX: 23.6 ML/M^2
LEFT ATRIUM VOLUME: 43.9 ML
LEUKOCYTE ESTERASE UR QL STRIP.AUTO: ABNORMAL
LEUKOCYTE ESTERASE UR QL STRIP.AUTO: NEGATIVE
MAXIMAL PREDICTED HEART RATE: 153 BPM
MCH RBC QN AUTO: 30.8 PG (ref 26.6–33)
MCH RBC QN AUTO: 32.4 PG (ref 26.6–33)
MCH RBC QN AUTO: 32.5 PG (ref 26.6–33)
MCH RBC QN AUTO: 32.8 PG (ref 26.6–33)
MCHC RBC AUTO-ENTMCNC: 31.3 G/DL (ref 31.5–35.7)
MCHC RBC AUTO-ENTMCNC: 32.9 G/DL (ref 31.5–35.7)
MCHC RBC AUTO-ENTMCNC: 33.1 G/DL (ref 31.5–35.7)
MCHC RBC AUTO-ENTMCNC: 33.1 G/DL (ref 31.5–35.7)
MCV RBC AUTO: 98 FL (ref 79–97)
MCV RBC AUTO: 98.6 FL (ref 79–97)
MCV RBC AUTO: 98.7 FL (ref 79–97)
MCV RBC AUTO: 99 FL (ref 79–97)
MIXING DRVVT: 53.5 SEC (ref 0–40.4)
NITRITE UR QL STRIP: NEGATIVE
NITRITE UR QL STRIP: POSITIVE
NT-PROBNP SERPL-MCNC: 2927 PG/ML (ref 5–900)
PE IGA SER-ACNC: 6.7 U/ML
PE IGG SER-ACNC: 1.2 U/ML
PE IGM SER-ACNC: 2.5 U/ML
PG IGA SER-ACNC: 3.9 U/ML
PG IGG SER-ACNC: 3.6 U/ML
PG IGM SER-ACNC: 1.9 U/ML
PH UR STRIP.AUTO: 6.5 [PH] (ref 5–8)
PH UR STRIP.AUTO: 8.5 [PH] (ref 5–8)
PHOSPHATE SERPL-MCNC: 3.1 MG/DL (ref 2.5–4.5)
PHOSPHATIDATE IGA SER-ACNC: 5.9 U/ML
PHOSPHATIDATE IGG SER-ACNC: 3.2 U/ML
PHOSPHATIDATE IGM SER-ACNC: 2.2 U/ML
PI IGA SER-ACNC: 4.7 U/ML
PI IGG SER-ACNC: 3.9 U/ML
PI IGM SER-ACNC: 4.1 U/ML
PLATELET # BLD AUTO: 165 10*3/MM3 (ref 140–450)
PLATELET # BLD AUTO: 220 10*3/MM3 (ref 140–450)
PLATELET # BLD AUTO: 222 10*3/MM3 (ref 140–450)
PLATELET # BLD AUTO: 259 10*3/MM3 (ref 140–450)
PMV BLD AUTO: 10 FL (ref 6–12)
PMV BLD AUTO: 11.4 FL (ref 6–12)
PMV BLD AUTO: 11.6 FL (ref 6–12)
PMV BLD AUTO: 12 FL (ref 6–12)
POTASSIUM BLD-SCNC: 3.5 MMOL/L (ref 3.5–5.2)
POTASSIUM BLD-SCNC: 3.5 MMOL/L (ref 3.5–5.2)
POTASSIUM BLD-SCNC: 3.7 MMOL/L (ref 3.5–5.2)
POTASSIUM BLD-SCNC: 3.8 MMOL/L (ref 3.5–5.2)
POTASSIUM BLD-SCNC: 4.1 MMOL/L (ref 3.5–5.2)
POTASSIUM SERPL-SCNC: 3.9 MMOL/L (ref 3.5–5.2)
PROT C ACT/NOR PPP CHRO: 109 %
PROT C AG ACT/NOR PPP IA: 90 % (ref 60–150)
PROT S ACT/NOR PPP: 102 % (ref 63–140)
PROT S AG ACT/NOR PPP IA: 83 % (ref 60–150)
PROT S FREE AG ACT/NOR PPP IA: 90 % (ref 57–157)
PROT SERPL-MCNC: 7.1 G/DL (ref 6–8.5)
PROT SERPL-MCNC: 7.2 G/DL (ref 6–8.5)
PROT UR QL STRIP: ABNORMAL
PROT UR QL STRIP: NEGATIVE
PS IGA SER-ACNC: 2 APS IGA (ref 0–20)
PS IGG SER-ACNC: 3 GPS IGG (ref 0–11)
PS IGM SER-ACNC: 10 MPS IGM (ref 0–25)
RBC # BLD AUTO: 4.38 10*6/MM3 (ref 3.77–5.28)
RBC # BLD AUTO: 4.53 10*6/MM3 (ref 3.77–5.28)
RBC # BLD AUTO: 4.79 10*6/MM3 (ref 3.77–5.28)
RBC # BLD AUTO: 4.9 10*6/MM3 (ref 3.77–5.28)
RBC # UR: ABNORMAL /HPF
RBC # UR: NORMAL /HPF
REF LAB TEST METHOD: ABNORMAL
REF LAB TEST METHOD: NORMAL
REF LAB TEST METHOD: NORMAL
RIGHT ARM BP: NORMAL MMHG
SARS-COV-2 N GENE NPH QL NAA+PROBE: NOT DETECTED
SARS-COV-2 RNA RESP QL NAA+PROBE: NOT DETECTED
SARS-COV-2 RNA RESP QL NAA+PROBE: NOT DETECTED
SCREEN APTT: 35.8 SEC (ref 0–51.9)
SCREEN DRVVT: 67.6 SEC (ref 0–47)
SODIUM BLD-SCNC: 135 MMOL/L (ref 136–145)
SODIUM BLD-SCNC: 139 MMOL/L (ref 136–145)
SODIUM BLD-SCNC: 140 MMOL/L (ref 136–145)
SODIUM BLD-SCNC: 141 MMOL/L (ref 136–145)
SODIUM BLD-SCNC: 142 MMOL/L (ref 136–145)
SODIUM SERPL-SCNC: 136 MMOL/L (ref 136–145)
SP GR UR STRIP: 1.01 (ref 1–1.03)
SP GR UR STRIP: 1.02 (ref 1–1.03)
SQUAMOUS #/AREA URNS HPF: ABNORMAL /HPF
SQUAMOUS #/AREA URNS HPF: NORMAL /HPF
STRESS TARGET HR: 130 BPM
THROMBIN TIME: 18 SEC (ref 0–23)
TRIGL SERPL-MCNC: 53 MG/DL (ref 0–150)
TRIGL SERPL-MCNC: 58 MG/DL (ref 0–150)
TROPONIN T SERPL-MCNC: <0.01 NG/ML (ref 0–0.03)
UROBILINOGEN UR QL STRIP: ABNORMAL
UROBILINOGEN UR QL STRIP: ABNORMAL
VLDLC SERPL-MCNC: 11.6 MG/DL
VLDLC SERPL-MCNC: 13 MG/DL (ref 5–40)
WBC # BLD AUTO: 7.17 10*3/MM3 (ref 3.4–10.8)
WBC NRBC COR # BLD: 10.63 10*3/MM3 (ref 3.4–10.8)
WBC NRBC COR # BLD: 17.12 10*3/MM3 (ref 3.4–10.8)
WBC NRBC COR # BLD: 9.51 10*3/MM3 (ref 3.4–10.8)
WBC UR QL AUTO: ABNORMAL /HPF
WBC UR QL AUTO: NORMAL /HPF

## 2020-01-01 PROCEDURE — 99214 OFFICE O/P EST MOD 30 MIN: CPT | Performed by: INTERNAL MEDICINE

## 2020-01-01 PROCEDURE — G0378 HOSPITAL OBSERVATION PER HR: HCPCS

## 2020-01-01 PROCEDURE — 63710000001 PREDNISONE PER 1 MG: Performed by: PHYSICIAN ASSISTANT

## 2020-01-01 PROCEDURE — 80048 BASIC METABOLIC PNL TOTAL CA: CPT | Performed by: INTERNAL MEDICINE

## 2020-01-01 PROCEDURE — 96365 THER/PROPH/DIAG IV INF INIT: CPT

## 2020-01-01 PROCEDURE — 85027 COMPLETE CBC AUTOMATED: CPT | Performed by: INTERNAL MEDICINE

## 2020-01-01 PROCEDURE — 87635 SARS-COV-2 COVID-19 AMP PRB: CPT | Performed by: INTERNAL MEDICINE

## 2020-01-01 PROCEDURE — 80053 COMPREHEN METABOLIC PANEL: CPT | Performed by: INTERNAL MEDICINE

## 2020-01-01 PROCEDURE — 94729 DIFFUSING CAPACITY: CPT | Performed by: INTERNAL MEDICINE

## 2020-01-01 PROCEDURE — 99232 SBSQ HOSP IP/OBS MODERATE 35: CPT | Performed by: INTERNAL MEDICINE

## 2020-01-01 PROCEDURE — 80069 RENAL FUNCTION PANEL: CPT | Performed by: INTERNAL MEDICINE

## 2020-01-01 PROCEDURE — 82962 GLUCOSE BLOOD TEST: CPT

## 2020-01-01 PROCEDURE — 92610 EVALUATE SWALLOWING FUNCTION: CPT

## 2020-01-01 PROCEDURE — 99253 IP/OBS CNSLTJ NEW/EST LOW 45: CPT | Performed by: NURSE PRACTITIONER

## 2020-01-01 PROCEDURE — 84484 ASSAY OF TROPONIN QUANT: CPT | Performed by: INTERNAL MEDICINE

## 2020-01-01 PROCEDURE — 85613 RUSSELL VIPER VENOM DILUTED: CPT | Performed by: PSYCHIATRY & NEUROLOGY

## 2020-01-01 PROCEDURE — 83520 IMMUNOASSAY QUANT NOS NONAB: CPT | Performed by: PSYCHIATRY & NEUROLOGY

## 2020-01-01 PROCEDURE — 94799 UNLISTED PULMONARY SVC/PX: CPT

## 2020-01-01 PROCEDURE — 97110 THERAPEUTIC EXERCISES: CPT

## 2020-01-01 PROCEDURE — 87636 SARSCOV2 & INF A&B AMP PRB: CPT | Performed by: INTERNAL MEDICINE

## 2020-01-01 PROCEDURE — 97535 SELF CARE MNGMENT TRAINING: CPT

## 2020-01-01 PROCEDURE — 99225 PR SBSQ OBSERVATION CARE/DAY 25 MINUTES: CPT | Performed by: INTERNAL MEDICINE

## 2020-01-01 PROCEDURE — 93880 EXTRACRANIAL BILAT STUDY: CPT | Performed by: INTERNAL MEDICINE

## 2020-01-01 PROCEDURE — 92523 SPEECH SOUND LANG COMPREHEN: CPT

## 2020-01-01 PROCEDURE — 97530 THERAPEUTIC ACTIVITIES: CPT

## 2020-01-01 PROCEDURE — 86146 BETA-2 GLYCOPROTEIN ANTIBODY: CPT | Performed by: PSYCHIATRY & NEUROLOGY

## 2020-01-01 PROCEDURE — 99239 HOSP IP/OBS DSCHRG MGMT >30: CPT | Performed by: INTERNAL MEDICINE

## 2020-01-01 PROCEDURE — 81240 F2 GENE: CPT | Performed by: PSYCHIATRY & NEUROLOGY

## 2020-01-01 PROCEDURE — 70496 CT ANGIOGRAPHY HEAD: CPT

## 2020-01-01 PROCEDURE — P9612 CATHETERIZE FOR URINE SPEC: HCPCS

## 2020-01-01 PROCEDURE — G0379 DIRECT REFER HOSPITAL OBSERV: HCPCS

## 2020-01-01 PROCEDURE — 85302 CLOT INHIBIT PROT C ANTIGEN: CPT | Performed by: PSYCHIATRY & NEUROLOGY

## 2020-01-01 PROCEDURE — 93005 ELECTROCARDIOGRAM TRACING: CPT | Performed by: INTERNAL MEDICINE

## 2020-01-01 PROCEDURE — 25010000002 CEFTRIAXONE PER 250 MG: Performed by: INTERNAL MEDICINE

## 2020-01-01 PROCEDURE — 99232 SBSQ HOSP IP/OBS MODERATE 35: CPT | Performed by: PHYSICIAN ASSISTANT

## 2020-01-01 PROCEDURE — 99254 IP/OBS CNSLTJ NEW/EST MOD 60: CPT | Performed by: NURSE PRACTITIONER

## 2020-01-01 PROCEDURE — 0 IOPAMIDOL PER 1 ML: Performed by: INTERNAL MEDICINE

## 2020-01-01 PROCEDURE — 83036 HEMOGLOBIN GLYCOSYLATED A1C: CPT | Performed by: PSYCHIATRY & NEUROLOGY

## 2020-01-01 PROCEDURE — 70498 CT ANGIOGRAPHY NECK: CPT

## 2020-01-01 PROCEDURE — 86148 ANTI-PHOSPHOLIPID ANTIBODY: CPT | Performed by: PSYCHIATRY & NEUROLOGY

## 2020-01-01 PROCEDURE — 99220 PR INITIAL OBSERVATION CARE/DAY 70 MINUTES: CPT | Performed by: INTERNAL MEDICINE

## 2020-01-01 PROCEDURE — U0002 COVID-19 LAB TEST NON-CDC: HCPCS | Performed by: INTERNAL MEDICINE

## 2020-01-01 PROCEDURE — 86147 CARDIOLIPIN ANTIBODY EA IG: CPT | Performed by: PSYCHIATRY & NEUROLOGY

## 2020-01-01 PROCEDURE — 71045 X-RAY EXAM CHEST 1 VIEW: CPT

## 2020-01-01 PROCEDURE — 97165 OT EVAL LOW COMPLEX 30 MIN: CPT

## 2020-01-01 PROCEDURE — 85305 CLOT INHIBIT PROT S TOTAL: CPT | Performed by: PSYCHIATRY & NEUROLOGY

## 2020-01-01 PROCEDURE — 70551 MRI BRAIN STEM W/O DYE: CPT

## 2020-01-01 PROCEDURE — 85732 THROMBOPLASTIN TIME PARTIAL: CPT | Performed by: PSYCHIATRY & NEUROLOGY

## 2020-01-01 PROCEDURE — 81001 URINALYSIS AUTO W/SCOPE: CPT | Performed by: INTERNAL MEDICINE

## 2020-01-01 PROCEDURE — 74230 X-RAY XM SWLNG FUNCJ C+: CPT

## 2020-01-01 PROCEDURE — 83880 ASSAY OF NATRIURETIC PEPTIDE: CPT | Performed by: INTERNAL MEDICINE

## 2020-01-01 PROCEDURE — 80048 BASIC METABOLIC PNL TOTAL CA: CPT | Performed by: PHYSICIAN ASSISTANT

## 2020-01-01 PROCEDURE — 85300 ANTITHROMBIN III ACTIVITY: CPT | Performed by: PSYCHIATRY & NEUROLOGY

## 2020-01-01 PROCEDURE — 93010 ELECTROCARDIOGRAM REPORT: CPT | Performed by: INTERNAL MEDICINE

## 2020-01-01 PROCEDURE — 71250 CT THORAX DX C-: CPT

## 2020-01-01 PROCEDURE — 97162 PT EVAL MOD COMPLEX 30 MIN: CPT

## 2020-01-01 PROCEDURE — 99213 OFFICE O/P EST LOW 20 MIN: CPT | Performed by: PHYSICIAN ASSISTANT

## 2020-01-01 PROCEDURE — 99000 SPECIMEN HANDLING OFFICE-LAB: CPT | Performed by: INTERNAL MEDICINE

## 2020-01-01 PROCEDURE — 85306 CLOT INHIBIT PROT S FREE: CPT | Performed by: PSYCHIATRY & NEUROLOGY

## 2020-01-01 PROCEDURE — 0042T HC CT CEREBRAL PERFUSION W/WO CONTRAST: CPT

## 2020-01-01 PROCEDURE — 25010000002 FUROSEMIDE PER 20 MG: Performed by: INTERNAL MEDICINE

## 2020-01-01 PROCEDURE — 93880 EXTRACRANIAL BILAT STUDY: CPT

## 2020-01-01 PROCEDURE — 93306 TTE W/DOPPLER COMPLETE: CPT

## 2020-01-01 PROCEDURE — 99244 OFF/OP CNSLTJ NEW/EST MOD 40: CPT | Performed by: PSYCHIATRY & NEUROLOGY

## 2020-01-01 PROCEDURE — 97116 GAIT TRAINING THERAPY: CPT

## 2020-01-01 PROCEDURE — 85303 CLOT INHIBIT PROT C ACTIVITY: CPT | Performed by: PSYCHIATRY & NEUROLOGY

## 2020-01-01 PROCEDURE — 92507 TX SP LANG VOICE COMM INDIV: CPT

## 2020-01-01 PROCEDURE — 83036 HEMOGLOBIN GLYCOSYLATED A1C: CPT | Performed by: INTERNAL MEDICINE

## 2020-01-01 PROCEDURE — 80061 LIPID PANEL: CPT | Performed by: PSYCHIATRY & NEUROLOGY

## 2020-01-01 PROCEDURE — 99217 PR OBSERVATION CARE DISCHARGE MANAGEMENT: CPT | Performed by: INTERNAL MEDICINE

## 2020-01-01 PROCEDURE — 94640 AIRWAY INHALATION TREATMENT: CPT

## 2020-01-01 PROCEDURE — 87086 URINE CULTURE/COLONY COUNT: CPT | Performed by: INTERNAL MEDICINE

## 2020-01-01 PROCEDURE — U0004 COV-19 TEST NON-CDC HGH THRU: HCPCS | Performed by: INTERNAL MEDICINE

## 2020-01-01 PROCEDURE — 92611 MOTION FLUOROSCOPY/SWALLOW: CPT

## 2020-01-01 PROCEDURE — 97161 PT EVAL LOW COMPLEX 20 MIN: CPT

## 2020-01-01 PROCEDURE — 94726 PLETHYSMOGRAPHY LUNG VOLUMES: CPT | Performed by: INTERNAL MEDICINE

## 2020-01-01 PROCEDURE — 80061 LIPID PANEL: CPT | Performed by: INTERNAL MEDICINE

## 2020-01-01 PROCEDURE — 85670 THROMBIN TIME PLASMA: CPT | Performed by: PSYCHIATRY & NEUROLOGY

## 2020-01-01 PROCEDURE — 94060 EVALUATION OF WHEEZING: CPT | Performed by: INTERNAL MEDICINE

## 2020-01-01 PROCEDURE — 85705 THROMBOPLASTIN INHIBITION: CPT | Performed by: PSYCHIATRY & NEUROLOGY

## 2020-01-01 PROCEDURE — 83090 ASSAY OF HOMOCYSTEINE: CPT | Performed by: PSYCHIATRY & NEUROLOGY

## 2020-01-01 PROCEDURE — 99223 1ST HOSP IP/OBS HIGH 75: CPT | Performed by: INTERNAL MEDICINE

## 2020-01-01 PROCEDURE — 99204 OFFICE O/P NEW MOD 45 MIN: CPT | Performed by: INTERNAL MEDICINE

## 2020-01-01 PROCEDURE — 85027 COMPLETE CBC AUTOMATED: CPT | Performed by: PHYSICIAN ASSISTANT

## 2020-01-01 PROCEDURE — 93306 TTE W/DOPPLER COMPLETE: CPT | Performed by: INTERNAL MEDICINE

## 2020-01-01 PROCEDURE — 81241 F5 GENE: CPT | Performed by: PSYCHIATRY & NEUROLOGY

## 2020-01-01 PROCEDURE — 99233 SBSQ HOSP IP/OBS HIGH 50: CPT | Performed by: PHYSICIAN ASSISTANT

## 2020-01-01 PROCEDURE — 86038 ANTINUCLEAR ANTIBODIES: CPT | Performed by: PSYCHIATRY & NEUROLOGY

## 2020-01-01 PROCEDURE — G0108 DIAB MANAGE TRN  PER INDIV: HCPCS | Performed by: REGISTERED NURSE

## 2020-01-01 PROCEDURE — 85220 BLOOC CLOT FACTOR V TEST: CPT | Performed by: PSYCHIATRY & NEUROLOGY

## 2020-01-01 RX ORDER — BUDESONIDE AND FORMOTEROL FUMARATE DIHYDRATE 160; 4.5 UG/1; UG/1
2 AEROSOL RESPIRATORY (INHALATION)
Qty: 10.2 G | Refills: 0 | Status: SHIPPED | OUTPATIENT
Start: 2020-01-01 | End: 2020-01-01

## 2020-01-01 RX ORDER — ACETAMINOPHEN 650 MG/1
650 SUPPOSITORY RECTAL EVERY 4 HOURS PRN
Status: DISCONTINUED | OUTPATIENT
Start: 2020-01-01 | End: 2020-01-01 | Stop reason: HOSPADM

## 2020-01-01 RX ORDER — BUDESONIDE 0.5 MG/2ML
0.5 INHALANT ORAL 2 TIMES DAILY PRN
Status: DISCONTINUED | OUTPATIENT
Start: 2020-01-01 | End: 2020-01-01

## 2020-01-01 RX ORDER — VENLAFAXINE HYDROCHLORIDE 75 MG/1
150 CAPSULE, EXTENDED RELEASE ORAL DAILY
Status: DISCONTINUED | OUTPATIENT
Start: 2020-01-01 | End: 2020-01-01 | Stop reason: HOSPADM

## 2020-01-01 RX ORDER — ACETAMINOPHEN 325 MG/1
650 TABLET ORAL EVERY 4 HOURS PRN
Status: DISCONTINUED | OUTPATIENT
Start: 2020-01-01 | End: 2020-01-01 | Stop reason: HOSPADM

## 2020-01-01 RX ORDER — DILTIAZEM HYDROCHLORIDE 180 MG/1
360 CAPSULE, COATED, EXTENDED RELEASE ORAL
Status: DISCONTINUED | OUTPATIENT
Start: 2020-01-01 | End: 2020-01-01 | Stop reason: HOSPADM

## 2020-01-01 RX ORDER — ASPIRIN 325 MG
325 TABLET ORAL DAILY
Status: DISCONTINUED | OUTPATIENT
Start: 2020-01-01 | End: 2020-01-01

## 2020-01-01 RX ORDER — FUROSEMIDE 40 MG/1
40 TABLET ORAL DAILY PRN
COMMUNITY
Start: 2020-01-01

## 2020-01-01 RX ORDER — DILTIAZEM HCL IN NACL,ISO-OSM 125 MG/125
5-15 PLASTIC BAG, INJECTION (ML) INTRAVENOUS
Status: DISCONTINUED | OUTPATIENT
Start: 2020-01-01 | End: 2020-01-01

## 2020-01-01 RX ORDER — LISINOPRIL 5 MG/1
5 TABLET ORAL
Qty: 30 TABLET | Refills: 0 | Status: SHIPPED | OUTPATIENT
Start: 2020-01-01

## 2020-01-01 RX ORDER — POTASSIUM CHLORIDE 750 MG/1
40 CAPSULE, EXTENDED RELEASE ORAL AS NEEDED
Status: DISCONTINUED | OUTPATIENT
Start: 2020-01-01 | End: 2020-01-01 | Stop reason: HOSPADM

## 2020-01-01 RX ORDER — METOPROLOL SUCCINATE 100 MG/1
100 TABLET, EXTENDED RELEASE ORAL
Status: DISCONTINUED | OUTPATIENT
Start: 2020-01-01 | End: 2020-01-01 | Stop reason: HOSPADM

## 2020-01-01 RX ORDER — CLOPIDOGREL BISULFATE 75 MG/1
75 TABLET ORAL DAILY
Status: DISCONTINUED | OUTPATIENT
Start: 2020-01-01 | End: 2020-01-01

## 2020-01-01 RX ORDER — SODIUM CHLORIDE 0.9 % (FLUSH) 0.9 %
10 SYRINGE (ML) INJECTION EVERY 12 HOURS SCHEDULED
Status: DISCONTINUED | OUTPATIENT
Start: 2020-01-01 | End: 2020-01-01 | Stop reason: HOSPADM

## 2020-01-01 RX ORDER — FUROSEMIDE 10 MG/ML
40 INJECTION INTRAMUSCULAR; INTRAVENOUS ONCE
Status: COMPLETED | OUTPATIENT
Start: 2020-01-01 | End: 2020-01-01

## 2020-01-01 RX ORDER — PREDNISONE 20 MG/1
20 TABLET ORAL ONCE
Qty: 1 TABLET | Refills: 0 | Status: SHIPPED | OUTPATIENT
Start: 2020-01-01 | End: 2020-01-01

## 2020-01-01 RX ORDER — ATORVASTATIN CALCIUM 40 MG/1
80 TABLET, FILM COATED ORAL NIGHTLY
Status: DISCONTINUED | OUTPATIENT
Start: 2020-01-01 | End: 2020-01-01

## 2020-01-01 RX ORDER — ONDANSETRON 4 MG/1
4 TABLET, FILM COATED ORAL EVERY 6 HOURS PRN
Status: DISCONTINUED | OUTPATIENT
Start: 2020-01-01 | End: 2020-01-01 | Stop reason: HOSPADM

## 2020-01-01 RX ORDER — POTASSIUM CHLORIDE 7.45 MG/ML
10 INJECTION INTRAVENOUS
Status: DISCONTINUED | OUTPATIENT
Start: 2020-01-01 | End: 2020-01-01 | Stop reason: HOSPADM

## 2020-01-01 RX ORDER — LISINOPRIL 5 MG/1
5 TABLET ORAL
Status: DISCONTINUED | OUTPATIENT
Start: 2020-01-01 | End: 2020-01-01 | Stop reason: HOSPADM

## 2020-01-01 RX ORDER — ASPIRIN 300 MG/1
300 SUPPOSITORY RECTAL DAILY
Status: DISCONTINUED | OUTPATIENT
Start: 2020-01-01 | End: 2020-01-01

## 2020-01-01 RX ORDER — CEFDINIR 300 MG/1
300 CAPSULE ORAL 2 TIMES DAILY
Qty: 8 CAPSULE | Refills: 0 | Status: ON HOLD | OUTPATIENT
Start: 2020-01-01 | End: 2021-01-01

## 2020-01-01 RX ORDER — IPRATROPIUM BROMIDE AND ALBUTEROL SULFATE 2.5; .5 MG/3ML; MG/3ML
3 SOLUTION RESPIRATORY (INHALATION)
Qty: 360 ML | Refills: 0 | Status: SHIPPED | OUTPATIENT
Start: 2020-01-01 | End: 2020-01-01 | Stop reason: SDUPTHER

## 2020-01-01 RX ORDER — POTASSIUM CHLORIDE 1.5 G/1.77G
40 POWDER, FOR SOLUTION ORAL AS NEEDED
Status: DISCONTINUED | OUTPATIENT
Start: 2020-01-01 | End: 2020-01-01 | Stop reason: HOSPADM

## 2020-01-01 RX ORDER — BISACODYL 10 MG
10 SUPPOSITORY, RECTAL RECTAL DAILY PRN
Status: DISCONTINUED | OUTPATIENT
Start: 2020-01-01 | End: 2020-01-01 | Stop reason: HOSPADM

## 2020-01-01 RX ORDER — MAGNESIUM SULFATE HEPTAHYDRATE 40 MG/ML
2 INJECTION, SOLUTION INTRAVENOUS AS NEEDED
Status: DISCONTINUED | OUTPATIENT
Start: 2020-01-01 | End: 2020-01-01 | Stop reason: HOSPADM

## 2020-01-01 RX ORDER — DILTIAZEM HYDROCHLORIDE 360 MG/1
360 CAPSULE, EXTENDED RELEASE ORAL
Qty: 30 CAPSULE | Refills: 0 | Status: SHIPPED | OUTPATIENT
Start: 2020-01-01

## 2020-01-01 RX ORDER — METOPROLOL SUCCINATE 100 MG/1
100 TABLET, EXTENDED RELEASE ORAL
Qty: 30 TABLET | Refills: 0 | Status: SHIPPED | OUTPATIENT
Start: 2020-01-01

## 2020-01-01 RX ORDER — METOPROLOL TARTRATE 5 MG/5ML
5 INJECTION INTRAVENOUS ONCE
Status: COMPLETED | OUTPATIENT
Start: 2020-01-01 | End: 2020-01-01

## 2020-01-01 RX ORDER — FUROSEMIDE 40 MG/1
40 TABLET ORAL DAILY
Status: DISCONTINUED | OUTPATIENT
Start: 2020-01-01 | End: 2020-01-01 | Stop reason: HOSPADM

## 2020-01-01 RX ORDER — ATORVASTATIN CALCIUM 20 MG/1
20 TABLET, FILM COATED ORAL NIGHTLY
Status: DISCONTINUED | OUTPATIENT
Start: 2020-01-01 | End: 2020-01-01 | Stop reason: HOSPADM

## 2020-01-01 RX ORDER — SODIUM CHLORIDE 0.9 % (FLUSH) 0.9 %
10 SYRINGE (ML) INJECTION AS NEEDED
Status: DISCONTINUED | OUTPATIENT
Start: 2020-01-01 | End: 2020-01-01 | Stop reason: HOSPADM

## 2020-01-01 RX ORDER — IPRATROPIUM BROMIDE AND ALBUTEROL SULFATE 2.5; .5 MG/3ML; MG/3ML
3 SOLUTION RESPIRATORY (INHALATION)
Qty: 360 ML | Refills: 1 | Status: SHIPPED | OUTPATIENT
Start: 2020-01-01

## 2020-01-01 RX ORDER — AMOXICILLIN 250 MG
2 CAPSULE ORAL 2 TIMES DAILY
Status: DISCONTINUED | OUTPATIENT
Start: 2020-01-01 | End: 2020-01-01 | Stop reason: HOSPADM

## 2020-01-01 RX ORDER — METOPROLOL SUCCINATE 50 MG/1
50 TABLET, EXTENDED RELEASE ORAL ONCE
Status: COMPLETED | OUTPATIENT
Start: 2020-01-01 | End: 2020-01-01

## 2020-01-01 RX ORDER — ALBUTEROL SULFATE 90 UG/1
4 AEROSOL, METERED RESPIRATORY (INHALATION) ONCE
Status: COMPLETED | OUTPATIENT
Start: 2020-01-01 | End: 2020-01-01

## 2020-01-01 RX ORDER — FLUOCINONIDE TOPICAL SOLUTION USP, 0.05% 0.5 MG/ML
SOLUTION TOPICAL
Status: ON HOLD | COMMUNITY
Start: 2020-01-01 | End: 2021-01-01

## 2020-01-01 RX ORDER — METOPROLOL SUCCINATE 100 MG/1
100 TABLET, EXTENDED RELEASE ORAL
Status: DISCONTINUED | OUTPATIENT
Start: 2020-01-01 | End: 2020-01-01

## 2020-01-01 RX ORDER — ONDANSETRON 2 MG/ML
4 INJECTION INTRAMUSCULAR; INTRAVENOUS EVERY 6 HOURS PRN
Status: DISCONTINUED | OUTPATIENT
Start: 2020-01-01 | End: 2020-01-01 | Stop reason: HOSPADM

## 2020-01-01 RX ORDER — BUDESONIDE 0.5 MG/2ML
0.5 INHALANT ORAL
Qty: 60 ML | Refills: 0 | Status: SHIPPED | OUTPATIENT
Start: 2020-01-01 | End: 2020-01-01 | Stop reason: SDUPTHER

## 2020-01-01 RX ORDER — TRAZODONE HYDROCHLORIDE 50 MG/1
50 TABLET ORAL NIGHTLY
Status: DISCONTINUED | OUTPATIENT
Start: 2020-01-01 | End: 2020-01-01

## 2020-01-01 RX ORDER — BUSPIRONE HYDROCHLORIDE 10 MG/1
10 TABLET ORAL 2 TIMES DAILY
Status: DISCONTINUED | OUTPATIENT
Start: 2020-01-01 | End: 2020-01-01 | Stop reason: HOSPADM

## 2020-01-01 RX ORDER — MULTIVITAMIN WITH IRON
250 TABLET ORAL DAILY
COMMUNITY

## 2020-01-01 RX ORDER — IPRATROPIUM BROMIDE AND ALBUTEROL SULFATE 2.5; .5 MG/3ML; MG/3ML
3 SOLUTION RESPIRATORY (INHALATION)
Status: DISCONTINUED | OUTPATIENT
Start: 2020-01-01 | End: 2020-01-01

## 2020-01-01 RX ORDER — TRAZODONE HYDROCHLORIDE 50 MG/1
50 TABLET ORAL NIGHTLY PRN
COMMUNITY

## 2020-01-01 RX ORDER — DILTIAZEM HYDROCHLORIDE 180 MG/1
180 CAPSULE, COATED, EXTENDED RELEASE ORAL
Status: DISCONTINUED | OUTPATIENT
Start: 2020-01-01 | End: 2020-01-01

## 2020-01-01 RX ORDER — IPRATROPIUM BROMIDE AND ALBUTEROL SULFATE 2.5; .5 MG/3ML; MG/3ML
3 SOLUTION RESPIRATORY (INHALATION)
Status: DISCONTINUED | OUTPATIENT
Start: 2020-01-01 | End: 2020-01-01 | Stop reason: HOSPADM

## 2020-01-01 RX ORDER — TRAZODONE HYDROCHLORIDE 50 MG/1
50 TABLET ORAL NIGHTLY PRN
Status: DISCONTINUED | OUTPATIENT
Start: 2020-01-01 | End: 2020-01-01 | Stop reason: HOSPADM

## 2020-01-01 RX ORDER — FLUTICASONE PROPIONATE 44 UG/1
1 AEROSOL, METERED RESPIRATORY (INHALATION)
Qty: 10.6 G | Refills: 0 | Status: SHIPPED | OUTPATIENT
Start: 2020-01-01 | End: 2020-01-01 | Stop reason: HOSPADM

## 2020-01-01 RX ORDER — ASPIRIN 81 MG/1
81 TABLET, CHEWABLE ORAL DAILY
Status: DISCONTINUED | OUTPATIENT
Start: 2020-01-01 | End: 2020-01-01 | Stop reason: HOSPADM

## 2020-01-01 RX ORDER — ATORVASTATIN CALCIUM 40 MG/1
40 TABLET, FILM COATED ORAL NIGHTLY
Status: DISCONTINUED | OUTPATIENT
Start: 2020-01-01 | End: 2020-01-01 | Stop reason: HOSPADM

## 2020-01-01 RX ORDER — ALBUTEROL SULFATE 2.5 MG/3ML
2.5 SOLUTION RESPIRATORY (INHALATION) EVERY 6 HOURS PRN
Status: DISCONTINUED | OUTPATIENT
Start: 2020-01-01 | End: 2020-01-01 | Stop reason: HOSPADM

## 2020-01-01 RX ORDER — ASPIRIN 81 MG/1
81 TABLET ORAL DAILY
Status: DISCONTINUED | OUTPATIENT
Start: 2020-01-01 | End: 2020-01-01 | Stop reason: HOSPADM

## 2020-01-01 RX ORDER — BUDESONIDE 0.5 MG/2ML
0.5 INHALANT ORAL
Status: DISCONTINUED | OUTPATIENT
Start: 2020-01-01 | End: 2020-01-01 | Stop reason: HOSPADM

## 2020-01-01 RX ORDER — BUDESONIDE 0.5 MG/2ML
0.5 INHALANT ORAL 2 TIMES DAILY
Qty: 60 ML | Refills: 1 | Status: SHIPPED | OUTPATIENT
Start: 2020-01-01 | End: 2020-01-01

## 2020-01-01 RX ORDER — MAGNESIUM SULFATE HEPTAHYDRATE 40 MG/ML
4 INJECTION, SOLUTION INTRAVENOUS AS NEEDED
Status: DISCONTINUED | OUTPATIENT
Start: 2020-01-01 | End: 2020-01-01 | Stop reason: HOSPADM

## 2020-01-01 RX ORDER — BUDESONIDE 0.5 MG/2ML
INHALANT ORAL
COMMUNITY
Start: 2020-01-01 | End: 2020-01-01

## 2020-01-01 RX ORDER — ALBUTEROL SULFATE 90 UG/1
2 AEROSOL, METERED RESPIRATORY (INHALATION) EVERY 4 HOURS PRN
Qty: 18 G | Refills: 5 | Status: SHIPPED | OUTPATIENT
Start: 2020-01-01

## 2020-01-01 RX ORDER — ATORVASTATIN CALCIUM 40 MG/1
40 TABLET, FILM COATED ORAL NIGHTLY
Qty: 30 TABLET | Refills: 0 | Status: SHIPPED | OUTPATIENT
Start: 2020-01-01

## 2020-01-01 RX ORDER — PREDNISONE 20 MG/1
40 TABLET ORAL
Status: DISCONTINUED | OUTPATIENT
Start: 2020-01-01 | End: 2020-01-01 | Stop reason: HOSPADM

## 2020-01-01 RX ORDER — BUDESONIDE 0.5 MG/2ML
0.5 INHALANT ORAL
Status: DISCONTINUED | OUTPATIENT
Start: 2020-01-01 | End: 2020-01-01

## 2020-01-01 RX ORDER — IPRATROPIUM BROMIDE AND ALBUTEROL SULFATE 2.5; .5 MG/3ML; MG/3ML
3 SOLUTION RESPIRATORY (INHALATION) EVERY 4 HOURS PRN
Status: DISCONTINUED | OUTPATIENT
Start: 2020-01-01 | End: 2020-01-01 | Stop reason: HOSPADM

## 2020-01-01 RX ORDER — CHOLECALCIFEROL (VITAMIN D3) 125 MCG
5 CAPSULE ORAL NIGHTLY
Status: DISCONTINUED | OUTPATIENT
Start: 2020-01-01 | End: 2020-01-01 | Stop reason: HOSPADM

## 2020-01-01 RX ADMIN — DOCUSATE SODIUM 50MG AND SENNOSIDES 8.6MG 2 TABLET: 8.6; 5 TABLET, FILM COATED ORAL at 09:12

## 2020-01-01 RX ADMIN — LISINOPRIL 5 MG: 5 TABLET ORAL at 08:29

## 2020-01-01 RX ADMIN — DOCUSATE SODIUM 50MG AND SENNOSIDES 8.6MG 2 TABLET: 8.6; 5 TABLET, FILM COATED ORAL at 08:32

## 2020-01-01 RX ADMIN — VENLAFAXINE HYDROCHLORIDE 150 MG: 75 CAPSULE, EXTENDED RELEASE ORAL at 12:04

## 2020-01-01 RX ADMIN — DOCUSATE SODIUM 50MG AND SENNOSIDES 8.6MG 2 TABLET: 8.6; 5 TABLET, FILM COATED ORAL at 20:28

## 2020-01-01 RX ADMIN — POTASSIUM CHLORIDE 40 MEQ: 10 CAPSULE, COATED, EXTENDED RELEASE ORAL at 17:41

## 2020-01-01 RX ADMIN — SODIUM CHLORIDE, PRESERVATIVE FREE 10 ML: 5 INJECTION INTRAVENOUS at 08:12

## 2020-01-01 RX ADMIN — METOPROLOL SUCCINATE 100 MG: 100 TABLET, EXTENDED RELEASE ORAL at 16:08

## 2020-01-01 RX ADMIN — ASPIRIN 325 MG ORAL TABLET 325 MG: 325 PILL ORAL at 11:12

## 2020-01-01 RX ADMIN — BUSPIRONE HYDROCHLORIDE 10 MG: 10 TABLET ORAL at 20:39

## 2020-01-01 RX ADMIN — ATORVASTATIN CALCIUM 20 MG: 20 TABLET, FILM COATED ORAL at 20:58

## 2020-01-01 RX ADMIN — Medication 5 MG/HR: at 00:16

## 2020-01-01 RX ADMIN — DILTIAZEM HYDROCHLORIDE 360 MG: 180 CAPSULE, COATED, EXTENDED RELEASE ORAL at 08:32

## 2020-01-01 RX ADMIN — FUROSEMIDE 40 MG: 40 TABLET ORAL at 08:29

## 2020-01-01 RX ADMIN — SODIUM CHLORIDE, PRESERVATIVE FREE 10 ML: 5 INJECTION INTRAVENOUS at 09:13

## 2020-01-01 RX ADMIN — IPRATROPIUM BROMIDE AND ALBUTEROL SULFATE 3 ML: 2.5; .5 SOLUTION RESPIRATORY (INHALATION) at 14:23

## 2020-01-01 RX ADMIN — IPRATROPIUM BROMIDE AND ALBUTEROL SULFATE 3 ML: 2.5; .5 SOLUTION RESPIRATORY (INHALATION) at 12:40

## 2020-01-01 RX ADMIN — SODIUM CHLORIDE, PRESERVATIVE FREE 10 ML: 5 INJECTION INTRAVENOUS at 08:15

## 2020-01-01 RX ADMIN — IPRATROPIUM BROMIDE AND ALBUTEROL SULFATE 3 ML: 2.5; .5 SOLUTION RESPIRATORY (INHALATION) at 20:42

## 2020-01-01 RX ADMIN — BUDESONIDE 0.5 MG: 0.5 INHALANT RESPIRATORY (INHALATION) at 19:06

## 2020-01-01 RX ADMIN — DOCUSATE SODIUM 50MG AND SENNOSIDES 8.6MG 2 TABLET: 8.6; 5 TABLET, FILM COATED ORAL at 21:10

## 2020-01-01 RX ADMIN — BUSPIRONE HYDROCHLORIDE 10 MG: 10 TABLET ORAL at 09:12

## 2020-01-01 RX ADMIN — ASPIRIN 81 MG: 81 TABLET, COATED ORAL at 08:32

## 2020-01-01 RX ADMIN — ATORVASTATIN CALCIUM 40 MG: 40 TABLET, FILM COATED ORAL at 20:37

## 2020-01-01 RX ADMIN — BUSPIRONE HYDROCHLORIDE 10 MG: 10 TABLET ORAL at 20:37

## 2020-01-01 RX ADMIN — DILTIAZEM HYDROCHLORIDE 360 MG: 180 CAPSULE, COATED, EXTENDED RELEASE ORAL at 08:14

## 2020-01-01 RX ADMIN — BUSPIRONE HYDROCHLORIDE 10 MG: 10 TABLET ORAL at 08:11

## 2020-01-01 RX ADMIN — RIVAROXABAN 20 MG: 20 TABLET, FILM COATED ORAL at 17:38

## 2020-01-01 RX ADMIN — SODIUM CHLORIDE, PRESERVATIVE FREE 10 ML: 5 INJECTION INTRAVENOUS at 20:28

## 2020-01-01 RX ADMIN — IPRATROPIUM BROMIDE AND ALBUTEROL SULFATE 3 ML: 2.5; .5 SOLUTION RESPIRATORY (INHALATION) at 21:15

## 2020-01-01 RX ADMIN — LISINOPRIL 5 MG: 5 TABLET ORAL at 15:32

## 2020-01-01 RX ADMIN — RIVAROXABAN 20 MG: 10 TABLET, FILM COATED ORAL at 17:19

## 2020-01-01 RX ADMIN — FUROSEMIDE 40 MG: 40 TABLET ORAL at 08:11

## 2020-01-01 RX ADMIN — IPRATROPIUM BROMIDE AND ALBUTEROL SULFATE 3 ML: 2.5; .5 SOLUTION RESPIRATORY (INHALATION) at 08:00

## 2020-01-01 RX ADMIN — SODIUM CHLORIDE, PRESERVATIVE FREE 10 ML: 5 INJECTION INTRAVENOUS at 21:37

## 2020-01-01 RX ADMIN — IPRATROPIUM BROMIDE AND ALBUTEROL SULFATE 3 ML: 2.5; .5 SOLUTION RESPIRATORY (INHALATION) at 16:34

## 2020-01-01 RX ADMIN — PREDNISONE 40 MG: 20 TABLET ORAL at 08:32

## 2020-01-01 RX ADMIN — IPRATROPIUM BROMIDE AND ALBUTEROL SULFATE 3 ML: 2.5; .5 SOLUTION RESPIRATORY (INHALATION) at 19:21

## 2020-01-01 RX ADMIN — BUSPIRONE HYDROCHLORIDE 10 MG: 10 TABLET ORAL at 08:32

## 2020-01-01 RX ADMIN — BUSPIRONE HYDROCHLORIDE 10 MG: 10 TABLET ORAL at 21:37

## 2020-01-01 RX ADMIN — DILTIAZEM HYDROCHLORIDE 360 MG: 180 CAPSULE, COATED, EXTENDED RELEASE ORAL at 15:32

## 2020-01-01 RX ADMIN — CEFTRIAXONE SODIUM 1 G: 1 INJECTION, POWDER, FOR SOLUTION INTRAMUSCULAR; INTRAVENOUS at 16:09

## 2020-01-01 RX ADMIN — BARIUM SULFATE 20 ML: 400 PASTE ORAL at 15:45

## 2020-01-01 RX ADMIN — DILTIAZEM HYDROCHLORIDE 180 MG: 180 CAPSULE, COATED, EXTENDED RELEASE ORAL at 16:08

## 2020-01-01 RX ADMIN — IPRATROPIUM BROMIDE AND ALBUTEROL SULFATE 3 ML: 2.5; .5 SOLUTION RESPIRATORY (INHALATION) at 07:28

## 2020-01-01 RX ADMIN — BUSPIRONE HYDROCHLORIDE 10 MG: 10 TABLET ORAL at 12:04

## 2020-01-01 RX ADMIN — BUDESONIDE 0.5 MG: 0.5 INHALANT RESPIRATORY (INHALATION) at 19:21

## 2020-01-01 RX ADMIN — METOPROLOL SUCCINATE 100 MG: 100 TABLET, EXTENDED RELEASE ORAL at 08:29

## 2020-01-01 RX ADMIN — SODIUM CHLORIDE, PRESERVATIVE FREE 10 ML: 5 INJECTION INTRAVENOUS at 08:33

## 2020-01-01 RX ADMIN — IPRATROPIUM BROMIDE AND ALBUTEROL SULFATE 3 ML: 2.5; .5 SOLUTION RESPIRATORY (INHALATION) at 19:06

## 2020-01-01 RX ADMIN — METOPROLOL SUCCINATE 100 MG: 100 TABLET, EXTENDED RELEASE ORAL at 15:33

## 2020-01-01 RX ADMIN — DILTIAZEM HYDROCHLORIDE 360 MG: 180 CAPSULE, COATED, EXTENDED RELEASE ORAL at 08:11

## 2020-01-01 RX ADMIN — IPRATROPIUM BROMIDE AND ALBUTEROL SULFATE 3 ML: 2.5; .5 SOLUTION RESPIRATORY (INHALATION) at 15:13

## 2020-01-01 RX ADMIN — BUSPIRONE HYDROCHLORIDE 10 MG: 10 TABLET ORAL at 20:28

## 2020-01-01 RX ADMIN — DOCUSATE SODIUM 50MG AND SENNOSIDES 8.6MG 2 TABLET: 8.6; 5 TABLET, FILM COATED ORAL at 08:11

## 2020-01-01 RX ADMIN — LISINOPRIL 5 MG: 5 TABLET ORAL at 08:32

## 2020-01-01 RX ADMIN — TRAZODONE HYDROCHLORIDE 50 MG: 50 TABLET ORAL at 20:45

## 2020-01-01 RX ADMIN — POLYETHYLENE GLYCOL 3350 17 G: 17 POWDER, FOR SOLUTION ORAL at 08:33

## 2020-01-01 RX ADMIN — PREDNISONE 40 MG: 20 TABLET ORAL at 08:14

## 2020-01-01 RX ADMIN — METOPROLOL TARTRATE 5 MG: 5 INJECTION INTRAVENOUS at 22:17

## 2020-01-01 RX ADMIN — CEFTRIAXONE SODIUM 1 G: 1 INJECTION, POWDER, FOR SOLUTION INTRAMUSCULAR; INTRAVENOUS at 18:06

## 2020-01-01 RX ADMIN — SODIUM CHLORIDE 1 G: 900 INJECTION INTRAVENOUS at 15:56

## 2020-01-01 RX ADMIN — DILTIAZEM HYDROCHLORIDE 360 MG: 180 CAPSULE, COATED, EXTENDED RELEASE ORAL at 08:30

## 2020-01-01 RX ADMIN — SODIUM CHLORIDE, PRESERVATIVE FREE 10 ML: 5 INJECTION INTRAVENOUS at 21:11

## 2020-01-01 RX ADMIN — CLOPIDOGREL BISULFATE 75 MG: 75 TABLET ORAL at 08:51

## 2020-01-01 RX ADMIN — BUDESONIDE 0.5 MG: 0.5 INHALANT RESPIRATORY (INHALATION) at 07:29

## 2020-01-01 RX ADMIN — IOPAMIDOL 40 ML: 755 INJECTION, SOLUTION INTRAVENOUS at 10:40

## 2020-01-01 RX ADMIN — VENLAFAXINE HYDROCHLORIDE 150 MG: 75 CAPSULE, EXTENDED RELEASE ORAL at 08:32

## 2020-01-01 RX ADMIN — VENLAFAXINE HYDROCHLORIDE 150 MG: 75 CAPSULE, EXTENDED RELEASE ORAL at 09:12

## 2020-01-01 RX ADMIN — ATORVASTATIN CALCIUM 80 MG: 40 TABLET, FILM COATED ORAL at 21:37

## 2020-01-01 RX ADMIN — BUSPIRONE HYDROCHLORIDE 10 MG: 10 TABLET ORAL at 20:58

## 2020-01-01 RX ADMIN — BUSPIRONE HYDROCHLORIDE 10 MG: 10 TABLET ORAL at 08:14

## 2020-01-01 RX ADMIN — METOPROLOL SUCCINATE 100 MG: 100 TABLET, EXTENDED RELEASE ORAL at 08:14

## 2020-01-01 RX ADMIN — SODIUM CHLORIDE, PRESERVATIVE FREE 10 ML: 5 INJECTION INTRAVENOUS at 20:40

## 2020-01-01 RX ADMIN — ASPIRIN 81 MG: 81 TABLET, COATED ORAL at 12:09

## 2020-01-01 RX ADMIN — IPRATROPIUM BROMIDE AND ALBUTEROL SULFATE 3 ML: 2.5; .5 SOLUTION RESPIRATORY (INHALATION) at 08:24

## 2020-01-01 RX ADMIN — METOPROLOL SUCCINATE 100 MG: 100 TABLET, EXTENDED RELEASE ORAL at 08:11

## 2020-01-01 RX ADMIN — ASPIRIN 325 MG ORAL TABLET 325 MG: 325 PILL ORAL at 08:51

## 2020-01-01 RX ADMIN — BUSPIRONE HYDROCHLORIDE 10 MG: 10 TABLET ORAL at 21:10

## 2020-01-01 RX ADMIN — ASPIRIN 81 MG: 81 TABLET, COATED ORAL at 08:13

## 2020-01-01 RX ADMIN — CEFTRIAXONE 1 G: 1 INJECTION, POWDER, FOR SOLUTION INTRAMUSCULAR; INTRAVENOUS at 08:32

## 2020-01-01 RX ADMIN — SODIUM CHLORIDE, PRESERVATIVE FREE 10 ML: 5 INJECTION INTRAVENOUS at 20:37

## 2020-01-01 RX ADMIN — CEFTRIAXONE SODIUM 1 G: 1 INJECTION, POWDER, FOR SOLUTION INTRAMUSCULAR; INTRAVENOUS at 17:37

## 2020-01-01 RX ADMIN — ASPIRIN 81 MG: 81 TABLET, CHEWABLE ORAL at 08:29

## 2020-01-01 RX ADMIN — METOPROLOL SUCCINATE 50 MG: 50 TABLET, EXTENDED RELEASE ORAL at 12:09

## 2020-01-01 RX ADMIN — PREDNISONE 40 MG: 20 TABLET ORAL at 18:06

## 2020-01-01 RX ADMIN — LISINOPRIL 5 MG: 5 TABLET ORAL at 11:50

## 2020-01-01 RX ADMIN — BUSPIRONE HYDROCHLORIDE 10 MG: 10 TABLET ORAL at 08:51

## 2020-01-01 RX ADMIN — DOCUSATE SODIUM 50MG AND SENNOSIDES 8.6MG 2 TABLET: 8.6; 5 TABLET, FILM COATED ORAL at 08:30

## 2020-01-01 RX ADMIN — DILTIAZEM HYDROCHLORIDE 180 MG: 180 CAPSULE, COATED, EXTENDED RELEASE ORAL at 09:12

## 2020-01-01 RX ADMIN — MELATONIN TAB 5 MG 5 MG: 5 TAB at 21:10

## 2020-01-01 RX ADMIN — BUSPIRONE HYDROCHLORIDE 10 MG: 10 TABLET ORAL at 08:29

## 2020-01-01 RX ADMIN — DOCUSATE SODIUM 50MG AND SENNOSIDES 8.6MG 2 TABLET: 8.6; 5 TABLET, FILM COATED ORAL at 20:39

## 2020-01-01 RX ADMIN — ALBUTEROL SULFATE 4 PUFF: 90 AEROSOL, METERED RESPIRATORY (INHALATION) at 12:55

## 2020-01-01 RX ADMIN — ATORVASTATIN CALCIUM 20 MG: 20 TABLET, FILM COATED ORAL at 20:28

## 2020-01-01 RX ADMIN — METOPROLOL SUCCINATE 100 MG: 100 TABLET, EXTENDED RELEASE ORAL at 09:12

## 2020-01-01 RX ADMIN — DOCUSATE SODIUM 50MG AND SENNOSIDES 8.6MG 2 TABLET: 8.6; 5 TABLET, FILM COATED ORAL at 20:58

## 2020-01-01 RX ADMIN — ATORVASTATIN CALCIUM 40 MG: 40 TABLET, FILM COATED ORAL at 20:39

## 2020-01-01 RX ADMIN — METOPROLOL SUCCINATE 100 MG: 100 TABLET, EXTENDED RELEASE ORAL at 08:32

## 2020-01-01 RX ADMIN — BARIUM SULFATE 100 ML: 0.81 POWDER, FOR SUSPENSION ORAL at 15:45

## 2020-01-01 RX ADMIN — ATORVASTATIN CALCIUM 20 MG: 20 TABLET, FILM COATED ORAL at 21:10

## 2020-01-01 RX ADMIN — VENLAFAXINE HYDROCHLORIDE 150 MG: 75 CAPSULE, EXTENDED RELEASE ORAL at 08:11

## 2020-01-01 RX ADMIN — SODIUM CHLORIDE 1 G: 900 INJECTION INTRAVENOUS at 08:11

## 2020-01-01 RX ADMIN — DOCUSATE SODIUM 50MG AND SENNOSIDES 8.6MG 2 TABLET: 8.6; 5 TABLET, FILM COATED ORAL at 08:51

## 2020-01-01 RX ADMIN — RIVAROXABAN 20 MG: 20 TABLET, FILM COATED ORAL at 17:14

## 2020-01-01 RX ADMIN — ASPIRIN 81 MG: 81 TABLET, CHEWABLE ORAL at 08:11

## 2020-01-01 RX ADMIN — DOCUSATE SODIUM 50MG AND SENNOSIDES 8.6MG 2 TABLET: 8.6; 5 TABLET, FILM COATED ORAL at 21:37

## 2020-01-01 RX ADMIN — POTASSIUM CHLORIDE 40 MEQ: 10 CAPSULE, COATED, EXTENDED RELEASE ORAL at 08:13

## 2020-01-01 RX ADMIN — IOPAMIDOL 75 ML: 755 INJECTION, SOLUTION INTRAVENOUS at 14:09

## 2020-01-01 RX ADMIN — RIVAROXABAN 20 MG: 20 TABLET, FILM COATED ORAL at 18:42

## 2020-01-01 RX ADMIN — RIVAROXABAN 20 MG: 20 TABLET, FILM COATED ORAL at 18:06

## 2020-01-01 RX ADMIN — VENLAFAXINE HYDROCHLORIDE 150 MG: 75 CAPSULE, EXTENDED RELEASE ORAL at 08:51

## 2020-01-01 RX ADMIN — SODIUM CHLORIDE, PRESERVATIVE FREE 10 ML: 5 INJECTION INTRAVENOUS at 08:28

## 2020-01-01 RX ADMIN — SODIUM CHLORIDE, PRESERVATIVE FREE 10 ML: 5 INJECTION INTRAVENOUS at 10:52

## 2020-01-01 RX ADMIN — DOCUSATE SODIUM 50MG AND SENNOSIDES 8.6MG 2 TABLET: 8.6; 5 TABLET, FILM COATED ORAL at 08:13

## 2020-01-01 RX ADMIN — DOCUSATE SODIUM 50MG AND SENNOSIDES 8.6MG 2 TABLET: 8.6; 5 TABLET, FILM COATED ORAL at 12:03

## 2020-01-01 RX ADMIN — VENLAFAXINE HYDROCHLORIDE 150 MG: 75 CAPSULE, EXTENDED RELEASE ORAL at 08:13

## 2020-01-01 RX ADMIN — DOCUSATE SODIUM 50MG AND SENNOSIDES 8.6MG 2 TABLET: 8.6; 5 TABLET, FILM COATED ORAL at 20:36

## 2020-01-01 RX ADMIN — SODIUM CHLORIDE, PRESERVATIVE FREE 10 ML: 5 INJECTION INTRAVENOUS at 21:00

## 2020-01-01 RX ADMIN — LISINOPRIL 5 MG: 5 TABLET ORAL at 08:11

## 2020-01-01 RX ADMIN — ASPIRIN 325 MG ORAL TABLET 325 MG: 325 PILL ORAL at 12:03

## 2020-01-01 RX ADMIN — FUROSEMIDE 40 MG: 10 INJECTION, SOLUTION INTRAMUSCULAR; INTRAVENOUS at 21:36

## 2020-01-01 RX ADMIN — IPRATROPIUM BROMIDE AND ALBUTEROL SULFATE 3 ML: 2.5; .5 SOLUTION RESPIRATORY (INHALATION) at 16:41

## 2020-01-01 RX ADMIN — CEFTRIAXONE SODIUM 1 G: 1 INJECTION, POWDER, FOR SOLUTION INTRAMUSCULAR; INTRAVENOUS at 18:42

## 2020-01-01 RX ADMIN — VENLAFAXINE HYDROCHLORIDE 150 MG: 75 CAPSULE, EXTENDED RELEASE ORAL at 08:29

## 2020-04-07 ENCOUNTER — HOSPITAL ENCOUNTER (OUTPATIENT)
Age: 65
End: 2020-04-07
Payer: COMMERCIAL

## 2020-04-07 DIAGNOSIS — I20.9: ICD-10-CM

## 2020-04-07 DIAGNOSIS — F10.99: ICD-10-CM

## 2020-04-07 DIAGNOSIS — Z86.79: ICD-10-CM

## 2020-04-07 DIAGNOSIS — I48.91: ICD-10-CM

## 2020-04-07 DIAGNOSIS — I50.9: Primary | ICD-10-CM

## 2020-04-07 DIAGNOSIS — R06.00: Primary | ICD-10-CM

## 2020-04-07 DIAGNOSIS — R60.0: ICD-10-CM

## 2020-04-07 DIAGNOSIS — F17.200: ICD-10-CM

## 2020-04-07 DIAGNOSIS — I10: ICD-10-CM

## 2020-04-07 LAB
ANION GAP SERPL CALC-SCNC: 13.6 MEQ/L (ref 5–15)
BUN SERPL-MCNC: 19 MG/DL (ref 7–17)
CALCIUM SPEC-MCNC: 10.1 MG/DL (ref 8.4–10.2)
CHLORIDE SPEC-SCNC: 94 MMOL/L (ref 98–107)
CO2 SERPL-SCNC: 33 MMOL/L (ref 22–30)
CREAT BLD-SCNC: 0.7 MG/DL (ref 0.52–1.04)
ESTIMATED GLOMERULAR FILT RATE: 84 ML/MIN (ref 60–?)
GFR (AFRICAN AMERICAN): 101 ML/MIN (ref 60–?)
GLUCOSE: 96 MG/DL (ref 74–100)
NT PRO BRAIN NATRIURETIC PEP.: 1010 PG/ML (ref 0–125)
POTASSIUM: 4.6 MMOL/L (ref 3.5–5.1)
SODIUM SPEC-SCNC: 136 MMOL/L (ref 136–145)

## 2020-04-07 PROCEDURE — 93306 TTE W/DOPPLER COMPLETE: CPT

## 2020-04-07 PROCEDURE — 80048 BASIC METABOLIC PNL TOTAL CA: CPT

## 2020-04-07 PROCEDURE — 36415 COLL VENOUS BLD VENIPUNCTURE: CPT

## 2020-04-07 PROCEDURE — 83880 ASSAY OF NATRIURETIC PEPTIDE: CPT

## 2020-04-07 PROCEDURE — 93017 CV STRESS TEST TRACING ONLY: CPT

## 2020-04-07 PROCEDURE — 78452 HT MUSCLE IMAGE SPECT MULT: CPT

## 2020-04-07 PROCEDURE — A9502 TC99M TETROFOSMIN: HCPCS

## 2020-04-16 ENCOUNTER — HOSPITAL ENCOUNTER (OUTPATIENT)
Age: 65
End: 2020-04-16
Payer: COMMERCIAL

## 2020-04-16 DIAGNOSIS — I48.91: Primary | ICD-10-CM

## 2020-04-16 DIAGNOSIS — I10: ICD-10-CM

## 2020-04-16 DIAGNOSIS — Z86.79: ICD-10-CM

## 2020-04-16 DIAGNOSIS — F17.200: ICD-10-CM

## 2020-04-16 DIAGNOSIS — I50.9: ICD-10-CM

## 2020-04-16 DIAGNOSIS — R60.0: ICD-10-CM

## 2020-04-16 LAB
ANION GAP SERPL CALC-SCNC: 13.4 MEQ/L (ref 5–15)
BUN SERPL-MCNC: 32 MG/DL (ref 7–17)
CALCIUM SPEC-MCNC: 10.1 MG/DL (ref 8.4–10.2)
CHLORIDE SPEC-SCNC: 96 MMOL/L (ref 98–107)
CO2 SERPL-SCNC: 34 MMOL/L (ref 22–30)
CREAT BLD-SCNC: 1.3 MG/DL (ref 0.52–1.04)
ESTIMATED GLOMERULAR FILT RATE: 41 ML/MIN (ref 60–?)
GFR (AFRICAN AMERICAN): 50 ML/MIN (ref 60–?)
GLUCOSE: 85 MG/DL (ref 74–100)
POTASSIUM: 4.4 MMOL/L (ref 3.5–5.1)
SODIUM SPEC-SCNC: 139 MMOL/L (ref 136–145)

## 2020-04-16 PROCEDURE — 80048 BASIC METABOLIC PNL TOTAL CA: CPT

## 2020-05-23 ENCOUNTER — APPOINTMENT (OUTPATIENT)
Dept: GENERAL RADIOLOGY | Facility: HOSPITAL | Age: 65
End: 2020-05-23

## 2020-05-23 ENCOUNTER — APPOINTMENT (OUTPATIENT)
Dept: CT IMAGING | Facility: HOSPITAL | Age: 65
End: 2020-05-23

## 2020-05-23 ENCOUNTER — HOSPITAL ENCOUNTER (INPATIENT)
Facility: HOSPITAL | Age: 65
LOS: 7 days | Discharge: NURSING, REHAB OR LONG-TERM CARE | End: 2020-05-30
Attending: EMERGENCY MEDICINE | Admitting: NEUROLOGICAL SURGERY

## 2020-05-23 DIAGNOSIS — I63.9 ACUTE CVA (CEREBROVASCULAR ACCIDENT) (HCC): ICD-10-CM

## 2020-05-23 DIAGNOSIS — Z78.9 IMPAIRED MOBILITY AND ADLS: ICD-10-CM

## 2020-05-23 DIAGNOSIS — Z74.09 IMPAIRED MOBILITY AND ADLS: ICD-10-CM

## 2020-05-23 DIAGNOSIS — R13.11 ORAL PHASE DYSPHAGIA: Primary | ICD-10-CM

## 2020-05-23 DIAGNOSIS — R47.01 APHASIA: ICD-10-CM

## 2020-05-23 LAB
ALT SERPL W P-5'-P-CCNC: 26 U/L (ref 1–33)
APTT PPP: 29.2 SECONDS (ref 24–37)
ARTERIAL PATENCY WRIST A: ABNORMAL
AST SERPL-CCNC: 30 U/L (ref 1–32)
ATMOSPHERIC PRESS: ABNORMAL MM[HG]
BASE EXCESS BLDA CALC-SCNC: 2.5 MMOL/L (ref 0–2)
BASE EXCESS BLDA CALC-SCNC: 6 MMOL/L (ref -5–5)
BASOPHILS # BLD AUTO: 0.07 10*3/MM3 (ref 0–0.2)
BASOPHILS NFR BLD AUTO: 0.6 % (ref 0–1.5)
BDY SITE: ABNORMAL
BILIRUB UR QL STRIP: NEGATIVE
BODY TEMPERATURE: 37 C
CA-I BLDA-SCNC: 1.21 MMOL/L (ref 1.2–1.32)
CLARITY UR: CLEAR
CO2 BLDA-SCNC: 30.3 MMOL/L (ref 22–33)
CO2 BLDA-SCNC: 34 MMOL/L (ref 24–29)
COHGB MFR BLD: 4.9 % (ref 0–2)
COLOR UR: YELLOW
CREAT BLDA-MCNC: 1.1 MG/DL (ref 0.6–1.3)
DEPRECATED RDW RBC AUTO: 50.6 FL (ref 37–54)
EOSINOPHIL # BLD AUTO: 0.29 10*3/MM3 (ref 0–0.4)
EOSINOPHIL NFR BLD AUTO: 2.5 % (ref 0.3–6.2)
EPAP: 0
ERYTHROCYTE [DISTWIDTH] IN BLOOD BY AUTOMATED COUNT: 13.8 % (ref 12.3–15.4)
GLUCOSE BLDC GLUCOMTR-MCNC: 119 MG/DL (ref 70–130)
GLUCOSE UR STRIP-MCNC: NEGATIVE MG/DL
HCO3 BLDA-SCNC: 28.8 MMOL/L (ref 20–26)
HCO3 BLDA-SCNC: 32 MMOL/L (ref 22–26)
HCT VFR BLD AUTO: 51.8 % (ref 34–46.6)
HCT VFR BLD CALC: 51.7 %
HCT VFR BLDA CALC: 52 % (ref 38–51)
HGB BLD-MCNC: 17.7 G/DL (ref 12–15.9)
HGB BLDA-MCNC: 16.9 G/DL (ref 14–18)
HGB BLDA-MCNC: 17.7 G/DL (ref 12–17)
HGB UR QL STRIP.AUTO: NEGATIVE
HOLD SPECIMEN: NORMAL
HOLD SPECIMEN: NORMAL
HOROWITZ INDEX BLD+IHG-RTO: 32 %
IMM GRANULOCYTES # BLD AUTO: 0.06 10*3/MM3 (ref 0–0.05)
IMM GRANULOCYTES NFR BLD AUTO: 0.5 % (ref 0–0.5)
INR PPP: 1.3 (ref 0.8–1.2)
IPAP: 0
KETONES UR QL STRIP: NEGATIVE
LEUKOCYTE ESTERASE UR QL STRIP.AUTO: NEGATIVE
LYMPHOCYTES # BLD AUTO: 1.23 10*3/MM3 (ref 0.7–3.1)
LYMPHOCYTES NFR BLD AUTO: 10.7 % (ref 19.6–45.3)
MCH RBC QN AUTO: 34.1 PG (ref 26.6–33)
MCHC RBC AUTO-ENTMCNC: 34.2 G/DL (ref 31.5–35.7)
MCV RBC AUTO: 99.8 FL (ref 79–97)
METHGB BLD QL: 0.2 % (ref 0–1.5)
MODALITY: ABNORMAL
MONOCYTES # BLD AUTO: 0.67 10*3/MM3 (ref 0.1–0.9)
MONOCYTES NFR BLD AUTO: 5.8 % (ref 5–12)
NEUTROPHILS # BLD AUTO: 9.19 10*3/MM3 (ref 1.7–7)
NEUTROPHILS NFR BLD AUTO: 79.9 % (ref 42.7–76)
NITRITE UR QL STRIP: NEGATIVE
NOTE: ABNORMAL
NRBC BLD AUTO-RTO: 0 /100 WBC (ref 0–0.2)
OXYHGB MFR BLDV: 86.7 % (ref 94–99)
PAW @ PEAK INSP FLOW SETTING VENT: 0 CMH2O
PCO2 BLDA: 49.1 MM HG (ref 35–45)
PCO2 BLDA: 60.5 MM HG (ref 35–45)
PCO2 TEMP ADJ BLD: 49.1 MM HG (ref 35–45)
PH BLDA: 7.33 PH UNITS (ref 7.35–7.6)
PH BLDA: 7.38 PH UNITS (ref 7.35–7.45)
PH UR STRIP.AUTO: 7.5 [PH] (ref 5–8)
PH, TEMP CORRECTED: 7.38 PH UNITS
PLATELET # BLD AUTO: 234 10*3/MM3 (ref 140–450)
PMV BLD AUTO: 10.6 FL (ref 6–12)
PO2 BLDA: 26 MMHG (ref 80–105)
PO2 BLDA: 61.1 MM HG (ref 83–108)
PO2 TEMP ADJ BLD: 61.1 MM HG (ref 83–108)
POTASSIUM BLDA-SCNC: 5 MMOL/L (ref 3.5–4.9)
PROT UR QL STRIP: NEGATIVE
PROTHROMBIN TIME: 15.2 SECONDS (ref 12.8–15.2)
RBC # BLD AUTO: 5.19 10*6/MM3 (ref 3.77–5.28)
SAO2 % BLDA: 43 % (ref 95–98)
SARS-COV-2 RNA RESP QL NAA+PROBE: NOT DETECTED
SODIUM BLDA-SCNC: 137 MMOL/L (ref 138–146)
SP GR UR STRIP: 1.02 (ref 1–1.03)
TOTAL RATE: 0 BREATHS/MINUTE
TROPONIN T SERPL-MCNC: <0.01 NG/ML (ref 0–0.03)
UROBILINOGEN UR QL STRIP: NORMAL
WBC NRBC COR # BLD: 11.51 10*3/MM3 (ref 3.4–10.8)
WHOLE BLOOD HOLD SPECIMEN: NORMAL
WHOLE BLOOD HOLD SPECIMEN: NORMAL

## 2020-05-23 PROCEDURE — C1894 INTRO/SHEATH, NON-LASER: HCPCS | Performed by: NEUROLOGICAL SURGERY

## 2020-05-23 PROCEDURE — 70450 CT HEAD/BRAIN W/O DYE: CPT

## 2020-05-23 PROCEDURE — C1760 CLOSURE DEV, VASC: HCPCS | Performed by: NEUROLOGICAL SURGERY

## 2020-05-23 PROCEDURE — C1769 GUIDE WIRE: HCPCS | Performed by: NEUROLOGICAL SURGERY

## 2020-05-23 PROCEDURE — 84132 ASSAY OF SERUM POTASSIUM: CPT

## 2020-05-23 PROCEDURE — 99285 EMERGENCY DEPT VISIT HI MDM: CPT

## 2020-05-23 PROCEDURE — C1725 CATH, TRANSLUMIN NON-LASER: HCPCS | Performed by: NEUROLOGICAL SURGERY

## 2020-05-23 PROCEDURE — 93005 ELECTROCARDIOGRAM TRACING: CPT | Performed by: EMERGENCY MEDICINE

## 2020-05-23 PROCEDURE — 82805 BLOOD GASES W/O2 SATURATION: CPT

## 2020-05-23 PROCEDURE — 85025 COMPLETE CBC W/AUTO DIFF WBC: CPT | Performed by: EMERGENCY MEDICINE

## 2020-05-23 PROCEDURE — 85730 THROMBOPLASTIN TIME PARTIAL: CPT | Performed by: EMERGENCY MEDICINE

## 2020-05-23 PROCEDURE — C2628 CATHETER, OCCLUSION: HCPCS | Performed by: NEUROLOGICAL SURGERY

## 2020-05-23 PROCEDURE — 87635 SARS-COV-2 COVID-19 AMP PRB: CPT | Performed by: EMERGENCY MEDICINE

## 2020-05-23 PROCEDURE — 0042T HC CT CEREBRAL PERFUSION W/WO CONTRAST: CPT

## 2020-05-23 PROCEDURE — B3171ZZ FLUOROSCOPY OF LEFT INTERNAL CAROTID ARTERY USING LOW OSMOLAR CONTRAST: ICD-10-PCS | Performed by: NEUROLOGICAL SURGERY

## 2020-05-23 PROCEDURE — 85610 PROTHROMBIN TIME: CPT

## 2020-05-23 PROCEDURE — 82330 ASSAY OF CALCIUM: CPT

## 2020-05-23 PROCEDURE — 81003 URINALYSIS AUTO W/O SCOPE: CPT | Performed by: EMERGENCY MEDICINE

## 2020-05-23 PROCEDURE — 25010000002 FENTANYL CITRATE (PF) 100 MCG/2ML SOLUTION: Performed by: NEUROLOGICAL SURGERY

## 2020-05-23 PROCEDURE — 03CG3Z7 EXTIRPATION OF MATTER FROM INTRACRANIAL ARTERY USING STENT RETRIEVER, PERCUTANEOUS APPROACH: ICD-10-PCS | Performed by: NEUROLOGICAL SURGERY

## 2020-05-23 PROCEDURE — 82803 BLOOD GASES ANY COMBINATION: CPT

## 2020-05-23 PROCEDURE — 61645 PERQ ART M-THROMBECT &/NFS: CPT | Performed by: NEUROLOGICAL SURGERY

## 2020-05-23 PROCEDURE — 82947 ASSAY GLUCOSE BLOOD QUANT: CPT

## 2020-05-23 PROCEDURE — 84484 ASSAY OF TROPONIN QUANT: CPT | Performed by: EMERGENCY MEDICINE

## 2020-05-23 PROCEDURE — B3141ZZ FLUOROSCOPY OF LEFT COMMON CAROTID ARTERY USING LOW OSMOLAR CONTRAST: ICD-10-PCS | Performed by: NEUROLOGICAL SURGERY

## 2020-05-23 PROCEDURE — 0 IODIXANOL PER 1 ML: Performed by: NEUROLOGICAL SURGERY

## 2020-05-23 PROCEDURE — 85014 HEMATOCRIT: CPT

## 2020-05-23 PROCEDURE — 0 IOPAMIDOL PER 1 ML: Performed by: EMERGENCY MEDICINE

## 2020-05-23 PROCEDURE — 70496 CT ANGIOGRAPHY HEAD: CPT

## 2020-05-23 PROCEDURE — 70498 CT ANGIOGRAPHY NECK: CPT

## 2020-05-23 PROCEDURE — 84295 ASSAY OF SERUM SODIUM: CPT

## 2020-05-23 PROCEDURE — 36600 WITHDRAWAL OF ARTERIAL BLOOD: CPT

## 2020-05-23 PROCEDURE — P9612 CATHETERIZE FOR URINE SPEC: HCPCS

## 2020-05-23 PROCEDURE — 82565 ASSAY OF CREATININE: CPT

## 2020-05-23 PROCEDURE — 71045 X-RAY EXAM CHEST 1 VIEW: CPT

## 2020-05-23 PROCEDURE — 84450 TRANSFERASE (AST) (SGOT): CPT | Performed by: EMERGENCY MEDICINE

## 2020-05-23 PROCEDURE — 84460 ALANINE AMINO (ALT) (SGPT): CPT | Performed by: EMERGENCY MEDICINE

## 2020-05-23 PROCEDURE — 99291 CRITICAL CARE FIRST HOUR: CPT | Performed by: INTERNAL MEDICINE

## 2020-05-23 RX ORDER — ASPIRIN 325 MG
325 TABLET ORAL DAILY
Status: DISCONTINUED | OUTPATIENT
Start: 2020-05-23 | End: 2020-05-27 | Stop reason: ALTCHOICE

## 2020-05-23 RX ORDER — ASPIRIN 300 MG/1
300 SUPPOSITORY RECTAL DAILY
Status: DISCONTINUED | OUTPATIENT
Start: 2020-05-23 | End: 2020-05-27 | Stop reason: ALTCHOICE

## 2020-05-23 RX ORDER — SODIUM CHLORIDE 0.9 % (FLUSH) 0.9 %
10 SYRINGE (ML) INJECTION AS NEEDED
Status: DISCONTINUED | OUTPATIENT
Start: 2020-05-23 | End: 2020-05-30 | Stop reason: HOSPADM

## 2020-05-23 RX ORDER — LIDOCAINE HYDROCHLORIDE 10 MG/ML
INJECTION, SOLUTION EPIDURAL; INFILTRATION; INTRACAUDAL; PERINEURAL AS NEEDED
Status: DISCONTINUED | OUTPATIENT
Start: 2020-05-23 | End: 2020-05-23 | Stop reason: HOSPADM

## 2020-05-23 RX ORDER — FENTANYL CITRATE 50 UG/ML
INJECTION, SOLUTION INTRAMUSCULAR; INTRAVENOUS AS NEEDED
Status: DISCONTINUED | OUTPATIENT
Start: 2020-05-23 | End: 2020-05-23 | Stop reason: HOSPADM

## 2020-05-23 RX ORDER — SODIUM CHLORIDE 9 MG/ML
75 INJECTION, SOLUTION INTRAVENOUS CONTINUOUS
Status: DISCONTINUED | OUTPATIENT
Start: 2020-05-23 | End: 2020-05-24

## 2020-05-23 RX ORDER — VENLAFAXINE HYDROCHLORIDE 75 MG/1
150 CAPSULE, EXTENDED RELEASE ORAL DAILY
Status: DISCONTINUED | OUTPATIENT
Start: 2020-05-23 | End: 2020-05-30 | Stop reason: HOSPADM

## 2020-05-23 RX ORDER — SODIUM CHLORIDE 0.9 % (FLUSH) 0.9 %
10 SYRINGE (ML) INJECTION EVERY 12 HOURS SCHEDULED
Status: DISCONTINUED | OUTPATIENT
Start: 2020-05-23 | End: 2020-05-30 | Stop reason: HOSPADM

## 2020-05-23 RX ORDER — SODIUM CHLORIDE 0.9 % (FLUSH) 0.9 %
3 SYRINGE (ML) INJECTION EVERY 12 HOURS SCHEDULED
Status: DISCONTINUED | OUTPATIENT
Start: 2020-05-23 | End: 2020-05-25 | Stop reason: SDUPTHER

## 2020-05-23 RX ORDER — SODIUM CHLORIDE 9 MG/ML
75 INJECTION, SOLUTION INTRAVENOUS CONTINUOUS
Status: DISCONTINUED | OUTPATIENT
Start: 2020-05-23 | End: 2020-05-30 | Stop reason: HOSPADM

## 2020-05-23 RX ORDER — ONDANSETRON 2 MG/ML
4 INJECTION INTRAMUSCULAR; INTRAVENOUS EVERY 6 HOURS PRN
Status: DISCONTINUED | OUTPATIENT
Start: 2020-05-23 | End: 2020-05-24 | Stop reason: SDUPTHER

## 2020-05-23 RX ORDER — AMLODIPINE BESYLATE 5 MG/1
5 TABLET ORAL DAILY
COMMUNITY
End: 2020-05-30 | Stop reason: HOSPADM

## 2020-05-23 RX ORDER — SODIUM CHLORIDE 0.9 % (FLUSH) 0.9 %
10 SYRINGE (ML) INJECTION AS NEEDED
Status: DISCONTINUED | OUTPATIENT
Start: 2020-05-23 | End: 2020-05-24

## 2020-05-23 RX ORDER — ASPIRIN 81 MG/1
81 TABLET ORAL DAILY
COMMUNITY

## 2020-05-23 RX ORDER — TRAZODONE HYDROCHLORIDE 50 MG/1
50 TABLET ORAL NIGHTLY
Status: DISCONTINUED | OUTPATIENT
Start: 2020-05-23 | End: 2020-05-30 | Stop reason: HOSPADM

## 2020-05-23 RX ORDER — TRAZODONE HYDROCHLORIDE 50 MG/1
50 TABLET ORAL NIGHTLY
COMMUNITY
End: 2020-05-30 | Stop reason: HOSPADM

## 2020-05-23 RX ORDER — MAGNESIUM SULFATE HEPTAHYDRATE 40 MG/ML
2 INJECTION, SOLUTION INTRAVENOUS AS NEEDED
Status: DISCONTINUED | OUTPATIENT
Start: 2020-05-23 | End: 2020-05-30 | Stop reason: HOSPADM

## 2020-05-23 RX ORDER — IODIXANOL 320 MG/ML
INJECTION, SOLUTION INTRAVASCULAR AS NEEDED
Status: DISCONTINUED | OUTPATIENT
Start: 2020-05-23 | End: 2020-05-23 | Stop reason: HOSPADM

## 2020-05-23 RX ORDER — MAGNESIUM SULFATE HEPTAHYDRATE 40 MG/ML
4 INJECTION, SOLUTION INTRAVENOUS AS NEEDED
Status: DISCONTINUED | OUTPATIENT
Start: 2020-05-23 | End: 2020-05-30 | Stop reason: HOSPADM

## 2020-05-23 RX ORDER — DEXTROSE MONOHYDRATE 25 G/50ML
25 INJECTION, SOLUTION INTRAVENOUS
Status: DISCONTINUED | OUTPATIENT
Start: 2020-05-23 | End: 2020-05-30 | Stop reason: HOSPADM

## 2020-05-23 RX ORDER — NICOTINE POLACRILEX 4 MG
15 LOZENGE BUCCAL
Status: DISCONTINUED | OUTPATIENT
Start: 2020-05-23 | End: 2020-05-30 | Stop reason: HOSPADM

## 2020-05-23 RX ORDER — ATORVASTATIN CALCIUM 40 MG/1
80 TABLET, FILM COATED ORAL NIGHTLY
Status: DISCONTINUED | OUTPATIENT
Start: 2020-05-23 | End: 2020-05-30 | Stop reason: HOSPADM

## 2020-05-23 RX ORDER — SPIRONOLACTONE 25 MG/1
25 TABLET ORAL DAILY
COMMUNITY
End: 2020-05-30 | Stop reason: HOSPADM

## 2020-05-23 RX ORDER — ALBUTEROL SULFATE 90 UG/1
2 AEROSOL, METERED RESPIRATORY (INHALATION) EVERY 4 HOURS PRN
COMMUNITY
End: 2020-01-01 | Stop reason: SDUPTHER

## 2020-05-23 RX ORDER — FUROSEMIDE 40 MG/1
40 TABLET ORAL DAILY
COMMUNITY
End: 2020-05-30 | Stop reason: HOSPADM

## 2020-05-23 RX ORDER — ONDANSETRON 2 MG/ML
4 INJECTION INTRAMUSCULAR; INTRAVENOUS EVERY 6 HOURS PRN
Status: DISCONTINUED | OUTPATIENT
Start: 2020-05-23 | End: 2020-05-30 | Stop reason: HOSPADM

## 2020-05-23 RX ORDER — POTASSIUM CHLORIDE 7.45 MG/ML
10 INJECTION INTRAVENOUS
Status: DISCONTINUED | OUTPATIENT
Start: 2020-05-23 | End: 2020-05-30 | Stop reason: HOSPADM

## 2020-05-23 RX ORDER — BUSPIRONE HYDROCHLORIDE 10 MG/1
10 TABLET ORAL 2 TIMES DAILY
Status: DISCONTINUED | OUTPATIENT
Start: 2020-05-23 | End: 2020-05-30 | Stop reason: HOSPADM

## 2020-05-23 RX ORDER — LISINOPRIL 20 MG/1
20 TABLET ORAL DAILY
COMMUNITY
End: 2020-05-30 | Stop reason: HOSPADM

## 2020-05-23 RX ORDER — METOPROLOL TARTRATE 75 MG/1
TABLET, FILM COATED ORAL 2 TIMES DAILY
COMMUNITY
End: 2020-05-30 | Stop reason: HOSPADM

## 2020-05-23 RX ORDER — SPIRONOLACTONE 25 MG/1
25 TABLET ORAL DAILY
Status: DISCONTINUED | OUTPATIENT
Start: 2020-05-23 | End: 2020-05-30 | Stop reason: HOSPADM

## 2020-05-23 RX ORDER — PHENYLEPHRINE HCL IN 0.9% NACL 0.5 MG/5ML
.5-3 SYRINGE (ML) INTRAVENOUS
Status: DISCONTINUED | OUTPATIENT
Start: 2020-05-23 | End: 2020-05-27

## 2020-05-23 RX ORDER — VENLAFAXINE HYDROCHLORIDE 150 MG/1
150 CAPSULE, EXTENDED RELEASE ORAL DAILY
COMMUNITY

## 2020-05-23 RX ORDER — BUSPIRONE HYDROCHLORIDE 10 MG/1
10 TABLET ORAL 2 TIMES DAILY
COMMUNITY

## 2020-05-23 RX ORDER — ACETAMINOPHEN 325 MG/1
650 TABLET ORAL EVERY 4 HOURS PRN
Status: DISCONTINUED | OUTPATIENT
Start: 2020-05-23 | End: 2020-05-30 | Stop reason: HOSPADM

## 2020-05-23 RX ORDER — AMLODIPINE BESYLATE 5 MG/1
5 TABLET ORAL DAILY
Status: DISCONTINUED | OUTPATIENT
Start: 2020-05-23 | End: 2020-05-29

## 2020-05-23 RX ADMIN — SODIUM CHLORIDE, PRESERVATIVE FREE 10 ML: 5 INJECTION INTRAVENOUS at 20:01

## 2020-05-23 RX ADMIN — IOPAMIDOL 115 ML: 755 INJECTION, SOLUTION INTRAVENOUS at 16:01

## 2020-05-23 RX ADMIN — SODIUM CHLORIDE, PRESERVATIVE FREE 3 ML: 5 INJECTION INTRAVENOUS at 20:05

## 2020-05-23 RX ADMIN — ASPIRIN 300 MG: 300 SUPPOSITORY RECTAL at 19:53

## 2020-05-23 RX ADMIN — NICARDIPINE HYDROCHLORIDE 5 MG/HR: 0.1 INJECTION, SOLUTION INTRAVENOUS at 20:20

## 2020-05-24 ENCOUNTER — APPOINTMENT (OUTPATIENT)
Dept: CT IMAGING | Facility: HOSPITAL | Age: 65
End: 2020-05-24

## 2020-05-24 ENCOUNTER — APPOINTMENT (OUTPATIENT)
Dept: CARDIOLOGY | Facility: HOSPITAL | Age: 65
End: 2020-05-24

## 2020-05-24 PROBLEM — I10 ESSENTIAL HYPERTENSION: Status: ACTIVE | Noted: 2020-05-24

## 2020-05-24 PROBLEM — J96.01 ACUTE RESPIRATORY FAILURE WITH HYPOXIA (HCC): Status: ACTIVE | Noted: 2020-05-24

## 2020-05-24 LAB
ALBUMIN SERPL-MCNC: 3.7 G/DL (ref 3.5–5.2)
ALBUMIN/GLOB SERPL: 1.2 G/DL
ALP SERPL-CCNC: 77 U/L (ref 39–117)
ALT SERPL W P-5'-P-CCNC: 23 U/L (ref 1–33)
ANION GAP SERPL CALCULATED.3IONS-SCNC: 12 MMOL/L (ref 5–15)
AST SERPL-CCNC: 28 U/L (ref 1–32)
BASOPHILS # BLD AUTO: 0.03 10*3/MM3 (ref 0–0.2)
BASOPHILS NFR BLD AUTO: 0.3 % (ref 0–1.5)
BH CV ECHO MEAS - AO MAX PG (FULL): -0.13 MMHG
BH CV ECHO MEAS - AO MAX PG: 3.7 MMHG
BH CV ECHO MEAS - AO MEAN PG (FULL): 0 MMHG
BH CV ECHO MEAS - AO MEAN PG: 2 MMHG
BH CV ECHO MEAS - AO ROOT AREA (BSA CORRECTED): 1.9
BH CV ECHO MEAS - AO ROOT AREA: 9.6 CM^2
BH CV ECHO MEAS - AO ROOT DIAM: 3.5 CM
BH CV ECHO MEAS - AO V2 MAX: 96.1 CM/SEC
BH CV ECHO MEAS - AO V2 MEAN: 68.4 CM/SEC
BH CV ECHO MEAS - AO V2 VTI: 22.6 CM
BH CV ECHO MEAS - AVA(I,A): 3 CM^2
BH CV ECHO MEAS - AVA(I,D): 3 CM^2
BH CV ECHO MEAS - AVA(V,A): 3.2 CM^2
BH CV ECHO MEAS - AVA(V,D): 3.2 CM^2
BH CV ECHO MEAS - BSA(HAYCOCK): 1.9 M^2
BH CV ECHO MEAS - BSA: 1.8 M^2
BH CV ECHO MEAS - BZI_BMI: 28.7 KILOGRAMS/M^2
BH CV ECHO MEAS - BZI_METRIC_HEIGHT: 162.6 CM
BH CV ECHO MEAS - BZI_METRIC_WEIGHT: 75.8 KG
BH CV ECHO MEAS - EDV(CUBED): 87.5 ML
BH CV ECHO MEAS - EDV(MOD-SP2): 49 ML
BH CV ECHO MEAS - EDV(MOD-SP4): 71 ML
BH CV ECHO MEAS - EDV(TEICH): 89.6 ML
BH CV ECHO MEAS - EF(CUBED): 78.3 %
BH CV ECHO MEAS - EF(MOD-BP): 70 %
BH CV ECHO MEAS - EF(MOD-SP2): 69.4 %
BH CV ECHO MEAS - EF(MOD-SP4): 67.6 %
BH CV ECHO MEAS - EF(TEICH): 70.7 %
BH CV ECHO MEAS - ESV(CUBED): 19 ML
BH CV ECHO MEAS - ESV(MOD-SP2): 15 ML
BH CV ECHO MEAS - ESV(MOD-SP4): 23 ML
BH CV ECHO MEAS - ESV(TEICH): 26.3 ML
BH CV ECHO MEAS - FS: 39.9 %
BH CV ECHO MEAS - IVS/LVPW: 1
BH CV ECHO MEAS - IVSD: 1.1 CM
BH CV ECHO MEAS - LA DIMENSION: 3.6 CM
BH CV ECHO MEAS - LA/AO: 1
BH CV ECHO MEAS - LAD MAJOR: 5.4 CM
BH CV ECHO MEAS - LAT PEAK E' VEL: 10.6 CM/SEC
BH CV ECHO MEAS - LATERAL E/E' RATIO: 7.1
BH CV ECHO MEAS - LV DIASTOLIC VOL/BSA (35-75): 39.2 ML/M^2
BH CV ECHO MEAS - LV MASS(C)D: 175.8 GRAMS
BH CV ECHO MEAS - LV MASS(C)DI: 97 GRAMS/M^2
BH CV ECHO MEAS - LV MAX PG: 3.8 MMHG
BH CV ECHO MEAS - LV MEAN PG: 2 MMHG
BH CV ECHO MEAS - LV SYSTOLIC VOL/BSA (12-30): 12.7 ML/M^2
BH CV ECHO MEAS - LV V1 MAX: 97.8 CM/SEC
BH CV ECHO MEAS - LV V1 MEAN: 61.8 CM/SEC
BH CV ECHO MEAS - LV V1 VTI: 21.9 CM
BH CV ECHO MEAS - LVIDD: 4.4 CM
BH CV ECHO MEAS - LVIDS: 2.7 CM
BH CV ECHO MEAS - LVLD AP2: 6.2 CM
BH CV ECHO MEAS - LVLD AP4: 6.5 CM
BH CV ECHO MEAS - LVLS AP2: 5.5 CM
BH CV ECHO MEAS - LVLS AP4: 5.6 CM
BH CV ECHO MEAS - LVOT AREA (M): 3.1 CM^2
BH CV ECHO MEAS - LVOT AREA: 3.1 CM^2
BH CV ECHO MEAS - LVOT DIAM: 2 CM
BH CV ECHO MEAS - LVPWD: 1.1 CM
BH CV ECHO MEAS - MED PEAK E' VEL: 6.5 CM/SEC
BH CV ECHO MEAS - MEDIAL E/E' RATIO: 11.6
BH CV ECHO MEAS - MV A MAX VEL: 59.7 CM/SEC
BH CV ECHO MEAS - MV DEC TIME: 0.16 SEC
BH CV ECHO MEAS - MV E MAX VEL: 75 CM/SEC
BH CV ECHO MEAS - MV E/A: 1.3
BH CV ECHO MEAS - MV MAX PG: 2.4 MMHG
BH CV ECHO MEAS - MV MEAN PG: 1 MMHG
BH CV ECHO MEAS - MV V2 MAX: 77.5 CM/SEC
BH CV ECHO MEAS - MV V2 MEAN: 38.6 CM/SEC
BH CV ECHO MEAS - MV V2 VTI: 18.4 CM
BH CV ECHO MEAS - MVA(VTI): 3.7 CM^2
BH CV ECHO MEAS - PA ACC SLOPE: 868 CM/SEC^2
BH CV ECHO MEAS - PA ACC TIME: 0.11 SEC
BH CV ECHO MEAS - PA MAX PG: 4.8 MMHG
BH CV ECHO MEAS - PA PR(ACCEL): 30 MMHG
BH CV ECHO MEAS - PA V2 MAX: 110 CM/SEC
BH CV ECHO MEAS - RVSP: 30 MMHG
BH CV ECHO MEAS - SI(AO): 120 ML/M^2
BH CV ECHO MEAS - SI(CUBED): 37.8 ML/M^2
BH CV ECHO MEAS - SI(LVOT): 38 ML/M^2
BH CV ECHO MEAS - SI(MOD-SP2): 18.8 ML/M^2
BH CV ECHO MEAS - SI(MOD-SP4): 26.5 ML/M^2
BH CV ECHO MEAS - SI(TEICH): 34.9 ML/M^2
BH CV ECHO MEAS - SV(AO): 217.4 ML
BH CV ECHO MEAS - SV(CUBED): 68.5 ML
BH CV ECHO MEAS - SV(LVOT): 68.8 ML
BH CV ECHO MEAS - SV(MOD-SP2): 34 ML
BH CV ECHO MEAS - SV(MOD-SP4): 48 ML
BH CV ECHO MEAS - SV(TEICH): 63.3 ML
BH CV ECHO MEAS - TAPSE (>1.6): 3.3 CM2
BH CV ECHO MEAS - TR MAX PG: 27 MMHG
BH CV ECHO MEAS - TR MAX VEL: 260 CM/SEC
BH CV ECHO MEASUREMENTS AVERAGE E/E' RATIO: 8.77
BH CV VAS BP RIGHT ARM: NORMAL MMHG
BH CV XLRA - RV BASE: 2 CM
BH CV XLRA - RV LENGTH: 5.8 CM
BH CV XLRA - RV MID: 1.3 CM
BH CV XLRA - TDI S': 15.5 CM/SEC
BILIRUB SERPL-MCNC: 0.7 MG/DL (ref 0.2–1.2)
BUN BLD-MCNC: 21 MG/DL (ref 8–23)
BUN/CREAT SERPL: 28 (ref 7–25)
CALCIUM SPEC-SCNC: 8.7 MG/DL (ref 8.6–10.5)
CHLORIDE SERPL-SCNC: 104 MMOL/L (ref 98–107)
CHOLEST SERPL-MCNC: 128 MG/DL (ref 0–200)
CO2 SERPL-SCNC: 26 MMOL/L (ref 22–29)
CREAT BLD-MCNC: 0.75 MG/DL (ref 0.57–1)
DEPRECATED RDW RBC AUTO: 53.2 FL (ref 37–54)
EOSINOPHIL # BLD AUTO: 0.04 10*3/MM3 (ref 0–0.4)
EOSINOPHIL NFR BLD AUTO: 0.4 % (ref 0.3–6.2)
ERYTHROCYTE [DISTWIDTH] IN BLOOD BY AUTOMATED COUNT: 14 % (ref 12.3–15.4)
GFR SERPL CREATININE-BSD FRML MDRD: 77 ML/MIN/1.73
GLOBULIN UR ELPH-MCNC: 3 GM/DL
GLUCOSE BLD-MCNC: 98 MG/DL (ref 65–99)
GLUCOSE BLDC GLUCOMTR-MCNC: 107 MG/DL (ref 70–130)
GLUCOSE BLDC GLUCOMTR-MCNC: 108 MG/DL (ref 70–130)
GLUCOSE BLDC GLUCOMTR-MCNC: 137 MG/DL (ref 70–130)
GLUCOSE BLDC GLUCOMTR-MCNC: 96 MG/DL (ref 70–130)
HBA1C MFR BLD: 6.1 % (ref 4.8–5.6)
HCT VFR BLD AUTO: 48.7 % (ref 34–46.6)
HDLC SERPL-MCNC: 52 MG/DL (ref 40–60)
HGB BLD-MCNC: 16 G/DL (ref 12–15.9)
IMM GRANULOCYTES # BLD AUTO: 0.04 10*3/MM3 (ref 0–0.05)
IMM GRANULOCYTES NFR BLD AUTO: 0.4 % (ref 0–0.5)
LDLC SERPL CALC-MCNC: 63 MG/DL (ref 0–100)
LDLC/HDLC SERPL: 1.22 {RATIO}
LEFT ATRIUM VOLUME INDEX: 23.7 ML/M^2
LEFT ATRIUM VOLUME: 43 ML
LV EF 2D ECHO EST: 66 %
LYMPHOCYTES # BLD AUTO: 1.16 10*3/MM3 (ref 0.7–3.1)
LYMPHOCYTES NFR BLD AUTO: 10.9 % (ref 19.6–45.3)
MAGNESIUM SERPL-MCNC: 2 MG/DL (ref 1.6–2.4)
MAXIMAL PREDICTED HEART RATE: 154 BPM
MCH RBC QN AUTO: 33.6 PG (ref 26.6–33)
MCHC RBC AUTO-ENTMCNC: 32.9 G/DL (ref 31.5–35.7)
MCV RBC AUTO: 102.3 FL (ref 79–97)
MONOCYTES # BLD AUTO: 0.65 10*3/MM3 (ref 0.1–0.9)
MONOCYTES NFR BLD AUTO: 6.1 % (ref 5–12)
NEUTROPHILS # BLD AUTO: 8.75 10*3/MM3 (ref 1.7–7)
NEUTROPHILS NFR BLD AUTO: 81.9 % (ref 42.7–76)
NRBC BLD AUTO-RTO: 0 /100 WBC (ref 0–0.2)
PHOSPHATE SERPL-MCNC: 3.3 MG/DL (ref 2.5–4.5)
PLATELET # BLD AUTO: 223 10*3/MM3 (ref 140–450)
PMV BLD AUTO: 10.9 FL (ref 6–12)
POTASSIUM BLD-SCNC: 4.1 MMOL/L (ref 3.5–5.2)
PROT SERPL-MCNC: 6.7 G/DL (ref 6–8.5)
RBC # BLD AUTO: 4.76 10*6/MM3 (ref 3.77–5.28)
SODIUM BLD-SCNC: 142 MMOL/L (ref 136–145)
STRESS TARGET HR: 131 BPM
TRIGL SERPL-MCNC: 64 MG/DL (ref 0–150)
VLDLC SERPL-MCNC: 12.8 MG/DL
WBC NRBC COR # BLD: 10.67 10*3/MM3 (ref 3.4–10.8)

## 2020-05-24 PROCEDURE — 83036 HEMOGLOBIN GLYCOSYLATED A1C: CPT | Performed by: NEUROLOGICAL SURGERY

## 2020-05-24 PROCEDURE — 92610 EVALUATE SWALLOWING FUNCTION: CPT

## 2020-05-24 PROCEDURE — 70450 CT HEAD/BRAIN W/O DYE: CPT

## 2020-05-24 PROCEDURE — 99232 SBSQ HOSP IP/OBS MODERATE 35: CPT | Performed by: INTERNAL MEDICINE

## 2020-05-24 PROCEDURE — 80061 LIPID PANEL: CPT | Performed by: NEUROLOGICAL SURGERY

## 2020-05-24 PROCEDURE — 93306 TTE W/DOPPLER COMPLETE: CPT | Performed by: INTERNAL MEDICINE

## 2020-05-24 PROCEDURE — 92523 SPEECH SOUND LANG COMPREHEN: CPT

## 2020-05-24 PROCEDURE — 93306 TTE W/DOPPLER COMPLETE: CPT

## 2020-05-24 PROCEDURE — 99232 SBSQ HOSP IP/OBS MODERATE 35: CPT | Performed by: NEUROLOGICAL SURGERY

## 2020-05-24 PROCEDURE — 83735 ASSAY OF MAGNESIUM: CPT | Performed by: NEUROLOGICAL SURGERY

## 2020-05-24 PROCEDURE — 80053 COMPREHEN METABOLIC PANEL: CPT | Performed by: NEUROLOGICAL SURGERY

## 2020-05-24 PROCEDURE — 97110 THERAPEUTIC EXERCISES: CPT

## 2020-05-24 PROCEDURE — 85025 COMPLETE CBC W/AUTO DIFF WBC: CPT | Performed by: NEUROLOGICAL SURGERY

## 2020-05-24 PROCEDURE — 94799 UNLISTED PULMONARY SVC/PX: CPT

## 2020-05-24 PROCEDURE — 82962 GLUCOSE BLOOD TEST: CPT

## 2020-05-24 PROCEDURE — 97166 OT EVAL MOD COMPLEX 45 MIN: CPT

## 2020-05-24 PROCEDURE — 25010000002 ONDANSETRON PER 1 MG: Performed by: NEUROLOGICAL SURGERY

## 2020-05-24 PROCEDURE — 84100 ASSAY OF PHOSPHORUS: CPT | Performed by: NEUROLOGICAL SURGERY

## 2020-05-24 PROCEDURE — P9612 CATHETERIZE FOR URINE SPEC: HCPCS

## 2020-05-24 PROCEDURE — 94640 AIRWAY INHALATION TREATMENT: CPT

## 2020-05-24 PROCEDURE — 97162 PT EVAL MOD COMPLEX 30 MIN: CPT

## 2020-05-24 RX ORDER — HYDRALAZINE HYDROCHLORIDE 20 MG/ML
20 INJECTION INTRAMUSCULAR; INTRAVENOUS EVERY 6 HOURS PRN
Status: DISCONTINUED | OUTPATIENT
Start: 2020-05-24 | End: 2020-05-30 | Stop reason: HOSPADM

## 2020-05-24 RX ORDER — IPRATROPIUM BROMIDE AND ALBUTEROL SULFATE 2.5; .5 MG/3ML; MG/3ML
3 SOLUTION RESPIRATORY (INHALATION)
Status: DISCONTINUED | OUTPATIENT
Start: 2020-05-24 | End: 2020-05-30

## 2020-05-24 RX ORDER — FAMOTIDINE 10 MG/ML
20 INJECTION, SOLUTION INTRAVENOUS EVERY 12 HOURS SCHEDULED
Status: DISCONTINUED | OUTPATIENT
Start: 2020-05-24 | End: 2020-05-25

## 2020-05-24 RX ADMIN — NICARDIPINE HYDROCHLORIDE 5 MG/HR: 0.1 INJECTION, SOLUTION INTRAVENOUS at 03:59

## 2020-05-24 RX ADMIN — IPRATROPIUM BROMIDE AND ALBUTEROL SULFATE 3 ML: 2.5; .5 SOLUTION RESPIRATORY (INHALATION) at 15:43

## 2020-05-24 RX ADMIN — FAMOTIDINE 20 MG: 10 INJECTION, SOLUTION INTRAVENOUS at 17:01

## 2020-05-24 RX ADMIN — METOPROLOL TARTRATE 12.5 MG: 25 TABLET, FILM COATED ORAL at 20:01

## 2020-05-24 RX ADMIN — ATORVASTATIN CALCIUM 80 MG: 40 TABLET, FILM COATED ORAL at 20:01

## 2020-05-24 RX ADMIN — BUSPIRONE HYDROCHLORIDE 10 MG: 10 TABLET ORAL at 20:01

## 2020-05-24 RX ADMIN — SODIUM CHLORIDE 75 ML/HR: 9 INJECTION, SOLUTION INTRAVENOUS at 00:11

## 2020-05-24 RX ADMIN — VENLAFAXINE HYDROCHLORIDE 150 MG: 75 CAPSULE, EXTENDED RELEASE ORAL at 12:19

## 2020-05-24 RX ADMIN — BUSPIRONE HYDROCHLORIDE 10 MG: 10 TABLET ORAL at 12:19

## 2020-05-24 RX ADMIN — TRAZODONE HYDROCHLORIDE 50 MG: 50 TABLET ORAL at 20:01

## 2020-05-24 RX ADMIN — IPRATROPIUM BROMIDE AND ALBUTEROL SULFATE 3 ML: 2.5; .5 SOLUTION RESPIRATORY (INHALATION) at 21:55

## 2020-05-24 RX ADMIN — ASPIRIN 325 MG ORAL TABLET 325 MG: 325 PILL ORAL at 12:19

## 2020-05-24 RX ADMIN — FAMOTIDINE 20 MG: 10 INJECTION, SOLUTION INTRAVENOUS at 20:01

## 2020-05-24 RX ADMIN — NICARDIPINE HYDROCHLORIDE 5 MG/HR: 0.1 INJECTION, SOLUTION INTRAVENOUS at 00:12

## 2020-05-24 RX ADMIN — SODIUM CHLORIDE, PRESERVATIVE FREE 3 ML: 5 INJECTION INTRAVENOUS at 20:01

## 2020-05-24 RX ADMIN — SODIUM CHLORIDE 75 ML/HR: 9 INJECTION, SOLUTION INTRAVENOUS at 14:14

## 2020-05-24 RX ADMIN — SODIUM CHLORIDE, PRESERVATIVE FREE 10 ML: 5 INJECTION INTRAVENOUS at 20:01

## 2020-05-24 RX ADMIN — SPIRONOLACTONE 25 MG: 25 TABLET ORAL at 12:19

## 2020-05-24 RX ADMIN — ONDANSETRON 4 MG: 2 INJECTION INTRAMUSCULAR; INTRAVENOUS at 08:59

## 2020-05-25 ENCOUNTER — APPOINTMENT (OUTPATIENT)
Dept: GENERAL RADIOLOGY | Facility: HOSPITAL | Age: 65
End: 2020-05-25

## 2020-05-25 ENCOUNTER — APPOINTMENT (OUTPATIENT)
Dept: CT IMAGING | Facility: HOSPITAL | Age: 65
End: 2020-05-25

## 2020-05-25 LAB
GLUCOSE BLDC GLUCOMTR-MCNC: 105 MG/DL (ref 70–130)
GLUCOSE BLDC GLUCOMTR-MCNC: 112 MG/DL (ref 70–130)
GLUCOSE BLDC GLUCOMTR-MCNC: 114 MG/DL (ref 70–130)
GLUCOSE BLDC GLUCOMTR-MCNC: 92 MG/DL (ref 70–130)

## 2020-05-25 PROCEDURE — 93005 ELECTROCARDIOGRAM TRACING: CPT | Performed by: NURSE PRACTITIONER

## 2020-05-25 PROCEDURE — 99231 SBSQ HOSP IP/OBS SF/LOW 25: CPT | Performed by: RADIOLOGY

## 2020-05-25 PROCEDURE — 82962 GLUCOSE BLOOD TEST: CPT

## 2020-05-25 PROCEDURE — P9612 CATHETERIZE FOR URINE SPEC: HCPCS

## 2020-05-25 PROCEDURE — 99233 SBSQ HOSP IP/OBS HIGH 50: CPT | Performed by: INTERNAL MEDICINE

## 2020-05-25 PROCEDURE — 93010 ELECTROCARDIOGRAM REPORT: CPT | Performed by: INTERNAL MEDICINE

## 2020-05-25 PROCEDURE — 97110 THERAPEUTIC EXERCISES: CPT

## 2020-05-25 PROCEDURE — 94799 UNLISTED PULMONARY SVC/PX: CPT

## 2020-05-25 PROCEDURE — 70450 CT HEAD/BRAIN W/O DYE: CPT

## 2020-05-25 PROCEDURE — 97530 THERAPEUTIC ACTIVITIES: CPT

## 2020-05-25 PROCEDURE — 71045 X-RAY EXAM CHEST 1 VIEW: CPT

## 2020-05-25 RX ORDER — METOPROLOL TARTRATE 5 MG/5ML
5 INJECTION INTRAVENOUS EVERY 6 HOURS PRN
Status: DISCONTINUED | OUTPATIENT
Start: 2020-05-25 | End: 2020-05-30 | Stop reason: HOSPADM

## 2020-05-25 RX ADMIN — FAMOTIDINE 20 MG: 10 INJECTION, SOLUTION INTRAVENOUS at 08:26

## 2020-05-25 RX ADMIN — ATORVASTATIN CALCIUM 80 MG: 40 TABLET, FILM COATED ORAL at 20:22

## 2020-05-25 RX ADMIN — IPRATROPIUM BROMIDE AND ALBUTEROL SULFATE 3 ML: 2.5; .5 SOLUTION RESPIRATORY (INHALATION) at 15:51

## 2020-05-25 RX ADMIN — TRAZODONE HYDROCHLORIDE 50 MG: 50 TABLET ORAL at 20:22

## 2020-05-25 RX ADMIN — IPRATROPIUM BROMIDE AND ALBUTEROL SULFATE 3 ML: 2.5; .5 SOLUTION RESPIRATORY (INHALATION) at 19:12

## 2020-05-25 RX ADMIN — BUSPIRONE HYDROCHLORIDE 10 MG: 10 TABLET ORAL at 20:22

## 2020-05-25 RX ADMIN — METOPROLOL TARTRATE 12.5 MG: 25 TABLET, FILM COATED ORAL at 08:25

## 2020-05-25 RX ADMIN — IPRATROPIUM BROMIDE AND ALBUTEROL SULFATE 3 ML: 2.5; .5 SOLUTION RESPIRATORY (INHALATION) at 12:21

## 2020-05-25 RX ADMIN — VENLAFAXINE HYDROCHLORIDE 150 MG: 75 CAPSULE, EXTENDED RELEASE ORAL at 08:26

## 2020-05-25 RX ADMIN — IPRATROPIUM BROMIDE AND ALBUTEROL SULFATE 3 ML: 2.5; .5 SOLUTION RESPIRATORY (INHALATION) at 07:30

## 2020-05-25 RX ADMIN — SODIUM CHLORIDE, PRESERVATIVE FREE 10 ML: 5 INJECTION INTRAVENOUS at 20:22

## 2020-05-25 RX ADMIN — AMLODIPINE BESYLATE 5 MG: 5 TABLET ORAL at 08:26

## 2020-05-25 RX ADMIN — SPIRONOLACTONE 25 MG: 25 TABLET ORAL at 08:26

## 2020-05-25 RX ADMIN — BUSPIRONE HYDROCHLORIDE 10 MG: 10 TABLET ORAL at 08:26

## 2020-05-25 RX ADMIN — METOPROLOL TARTRATE 12.5 MG: 25 TABLET, FILM COATED ORAL at 20:22

## 2020-05-25 RX ADMIN — ASPIRIN 325 MG ORAL TABLET 325 MG: 325 PILL ORAL at 08:25

## 2020-05-25 RX ADMIN — METOPROLOL TARTRATE 5 MG: 5 INJECTION INTRAVENOUS at 17:26

## 2020-05-26 PROBLEM — I48.0 PAROXYSMAL ATRIAL FIBRILLATION (HCC): Status: ACTIVE | Noted: 2020-05-26

## 2020-05-26 LAB
ANION GAP SERPL CALCULATED.3IONS-SCNC: 13 MMOL/L (ref 5–15)
BASOPHILS # BLD AUTO: 0.04 10*3/MM3 (ref 0–0.2)
BASOPHILS NFR BLD AUTO: 0.4 % (ref 0–1.5)
BUN BLD-MCNC: 8 MG/DL (ref 8–23)
BUN/CREAT SERPL: 16 (ref 7–25)
CALCIUM SPEC-SCNC: 8.8 MG/DL (ref 8.6–10.5)
CHLORIDE SERPL-SCNC: 101 MMOL/L (ref 98–107)
CO2 SERPL-SCNC: 25 MMOL/L (ref 22–29)
CREAT BLD-MCNC: 0.5 MG/DL (ref 0.57–1)
DEPRECATED RDW RBC AUTO: 52.3 FL (ref 37–54)
EOSINOPHIL # BLD AUTO: 0.21 10*3/MM3 (ref 0–0.4)
EOSINOPHIL NFR BLD AUTO: 2.2 % (ref 0.3–6.2)
ERYTHROCYTE [DISTWIDTH] IN BLOOD BY AUTOMATED COUNT: 14 % (ref 12.3–15.4)
GFR SERPL CREATININE-BSD FRML MDRD: 123 ML/MIN/1.73
GLUCOSE BLD-MCNC: 84 MG/DL (ref 65–99)
GLUCOSE BLDC GLUCOMTR-MCNC: 136 MG/DL (ref 70–130)
GLUCOSE BLDC GLUCOMTR-MCNC: 82 MG/DL (ref 70–130)
GLUCOSE BLDC GLUCOMTR-MCNC: 84 MG/DL (ref 70–130)
GLUCOSE BLDC GLUCOMTR-MCNC: 85 MG/DL (ref 70–130)
HCT VFR BLD AUTO: 49.8 % (ref 34–46.6)
HGB BLD-MCNC: 16.6 G/DL (ref 12–15.9)
IMM GRANULOCYTES # BLD AUTO: 0.04 10*3/MM3 (ref 0–0.05)
IMM GRANULOCYTES NFR BLD AUTO: 0.4 % (ref 0–0.5)
LYMPHOCYTES # BLD AUTO: 1.41 10*3/MM3 (ref 0.7–3.1)
LYMPHOCYTES NFR BLD AUTO: 14.6 % (ref 19.6–45.3)
MCH RBC QN AUTO: 33.8 PG (ref 26.6–33)
MCHC RBC AUTO-ENTMCNC: 33.3 G/DL (ref 31.5–35.7)
MCV RBC AUTO: 101.4 FL (ref 79–97)
MONOCYTES # BLD AUTO: 0.64 10*3/MM3 (ref 0.1–0.9)
MONOCYTES NFR BLD AUTO: 6.6 % (ref 5–12)
NEUTROPHILS # BLD AUTO: 7.34 10*3/MM3 (ref 1.7–7)
NEUTROPHILS NFR BLD AUTO: 75.8 % (ref 42.7–76)
NRBC BLD AUTO-RTO: 0 /100 WBC (ref 0–0.2)
PLATELET # BLD AUTO: 206 10*3/MM3 (ref 140–450)
PMV BLD AUTO: 11 FL (ref 6–12)
POTASSIUM BLD-SCNC: 3.6 MMOL/L (ref 3.5–5.2)
RBC # BLD AUTO: 4.91 10*6/MM3 (ref 3.77–5.28)
SODIUM BLD-SCNC: 139 MMOL/L (ref 136–145)
WBC NRBC COR # BLD: 9.68 10*3/MM3 (ref 3.4–10.8)

## 2020-05-26 PROCEDURE — 99231 SBSQ HOSP IP/OBS SF/LOW 25: CPT | Performed by: NEUROLOGICAL SURGERY

## 2020-05-26 PROCEDURE — 97110 THERAPEUTIC EXERCISES: CPT

## 2020-05-26 PROCEDURE — 94799 UNLISTED PULMONARY SVC/PX: CPT

## 2020-05-26 PROCEDURE — 82962 GLUCOSE BLOOD TEST: CPT

## 2020-05-26 PROCEDURE — 80048 BASIC METABOLIC PNL TOTAL CA: CPT | Performed by: INTERNAL MEDICINE

## 2020-05-26 PROCEDURE — 97530 THERAPEUTIC ACTIVITIES: CPT

## 2020-05-26 PROCEDURE — 99233 SBSQ HOSP IP/OBS HIGH 50: CPT | Performed by: INTERNAL MEDICINE

## 2020-05-26 PROCEDURE — 85025 COMPLETE CBC W/AUTO DIFF WBC: CPT | Performed by: INTERNAL MEDICINE

## 2020-05-26 RX ADMIN — VENLAFAXINE HYDROCHLORIDE 150 MG: 75 CAPSULE, EXTENDED RELEASE ORAL at 08:21

## 2020-05-26 RX ADMIN — SODIUM CHLORIDE 75 ML/HR: 9 INJECTION, SOLUTION INTRAVENOUS at 19:44

## 2020-05-26 RX ADMIN — SODIUM CHLORIDE, PRESERVATIVE FREE 10 ML: 5 INJECTION INTRAVENOUS at 08:22

## 2020-05-26 RX ADMIN — BUSPIRONE HYDROCHLORIDE 10 MG: 10 TABLET ORAL at 20:49

## 2020-05-26 RX ADMIN — ATORVASTATIN CALCIUM 80 MG: 40 TABLET, FILM COATED ORAL at 20:49

## 2020-05-26 RX ADMIN — METOPROLOL TARTRATE 12.5 MG: 25 TABLET, FILM COATED ORAL at 08:21

## 2020-05-26 RX ADMIN — IPRATROPIUM BROMIDE AND ALBUTEROL SULFATE 3 ML: 2.5; .5 SOLUTION RESPIRATORY (INHALATION) at 19:48

## 2020-05-26 RX ADMIN — METOPROLOL TARTRATE 75 MG: 25 TABLET, FILM COATED ORAL at 20:49

## 2020-05-26 RX ADMIN — SODIUM CHLORIDE, PRESERVATIVE FREE 10 ML: 5 INJECTION INTRAVENOUS at 20:48

## 2020-05-26 RX ADMIN — AMLODIPINE BESYLATE 5 MG: 5 TABLET ORAL at 08:22

## 2020-05-26 RX ADMIN — SPIRONOLACTONE 25 MG: 25 TABLET ORAL at 08:21

## 2020-05-26 RX ADMIN — IPRATROPIUM BROMIDE AND ALBUTEROL SULFATE 3 ML: 2.5; .5 SOLUTION RESPIRATORY (INHALATION) at 16:54

## 2020-05-26 RX ADMIN — IPRATROPIUM BROMIDE AND ALBUTEROL SULFATE 3 ML: 2.5; .5 SOLUTION RESPIRATORY (INHALATION) at 12:49

## 2020-05-26 RX ADMIN — BUSPIRONE HYDROCHLORIDE 10 MG: 10 TABLET ORAL at 08:22

## 2020-05-26 RX ADMIN — ASPIRIN 325 MG ORAL TABLET 325 MG: 325 PILL ORAL at 08:21

## 2020-05-26 RX ADMIN — SODIUM CHLORIDE 75 ML/HR: 9 INJECTION, SOLUTION INTRAVENOUS at 05:58

## 2020-05-26 RX ADMIN — IPRATROPIUM BROMIDE AND ALBUTEROL SULFATE 3 ML: 2.5; .5 SOLUTION RESPIRATORY (INHALATION) at 08:49

## 2020-05-26 RX ADMIN — TRAZODONE HYDROCHLORIDE 50 MG: 50 TABLET ORAL at 20:49

## 2020-05-27 LAB
ANION GAP SERPL CALCULATED.3IONS-SCNC: 11 MMOL/L (ref 5–15)
BASOPHILS # BLD AUTO: 0.05 10*3/MM3 (ref 0–0.2)
BASOPHILS NFR BLD AUTO: 0.6 % (ref 0–1.5)
BUN BLD-MCNC: 10 MG/DL (ref 8–23)
BUN/CREAT SERPL: 19.2 (ref 7–25)
CALCIUM SPEC-SCNC: 8.5 MG/DL (ref 8.6–10.5)
CHLORIDE SERPL-SCNC: 103 MMOL/L (ref 98–107)
CO2 SERPL-SCNC: 24 MMOL/L (ref 22–29)
CREAT BLD-MCNC: 0.52 MG/DL (ref 0.57–1)
DEPRECATED RDW RBC AUTO: 50.5 FL (ref 37–54)
EOSINOPHIL # BLD AUTO: 0.32 10*3/MM3 (ref 0–0.4)
EOSINOPHIL NFR BLD AUTO: 3.6 % (ref 0.3–6.2)
ERYTHROCYTE [DISTWIDTH] IN BLOOD BY AUTOMATED COUNT: 13.8 % (ref 12.3–15.4)
GFR SERPL CREATININE-BSD FRML MDRD: 118 ML/MIN/1.73
GLUCOSE BLD-MCNC: 97 MG/DL (ref 65–99)
GLUCOSE BLDC GLUCOMTR-MCNC: 134 MG/DL (ref 70–130)
GLUCOSE BLDC GLUCOMTR-MCNC: 74 MG/DL (ref 70–130)
GLUCOSE BLDC GLUCOMTR-MCNC: 93 MG/DL (ref 70–130)
HCT VFR BLD AUTO: 44.9 % (ref 34–46.6)
HGB BLD-MCNC: 15.2 G/DL (ref 12–15.9)
IMM GRANULOCYTES # BLD AUTO: 0.03 10*3/MM3 (ref 0–0.05)
IMM GRANULOCYTES NFR BLD AUTO: 0.3 % (ref 0–0.5)
LYMPHOCYTES # BLD AUTO: 1.14 10*3/MM3 (ref 0.7–3.1)
LYMPHOCYTES NFR BLD AUTO: 12.9 % (ref 19.6–45.3)
MAGNESIUM SERPL-MCNC: 1.6 MG/DL (ref 1.6–2.4)
MCH RBC QN AUTO: 34.1 PG (ref 26.6–33)
MCHC RBC AUTO-ENTMCNC: 33.9 G/DL (ref 31.5–35.7)
MCV RBC AUTO: 100.7 FL (ref 79–97)
MONOCYTES # BLD AUTO: 0.7 10*3/MM3 (ref 0.1–0.9)
MONOCYTES NFR BLD AUTO: 7.9 % (ref 5–12)
NEUTROPHILS # BLD AUTO: 6.62 10*3/MM3 (ref 1.7–7)
NEUTROPHILS NFR BLD AUTO: 74.7 % (ref 42.7–76)
NRBC BLD AUTO-RTO: 0 /100 WBC (ref 0–0.2)
PHOSPHATE SERPL-MCNC: 2.9 MG/DL (ref 2.5–4.5)
PLATELET # BLD AUTO: 206 10*3/MM3 (ref 140–450)
PMV BLD AUTO: 10.6 FL (ref 6–12)
POTASSIUM BLD-SCNC: 3.8 MMOL/L (ref 3.5–5.2)
RBC # BLD AUTO: 4.46 10*6/MM3 (ref 3.77–5.28)
SODIUM BLD-SCNC: 138 MMOL/L (ref 136–145)
WBC NRBC COR # BLD: 8.86 10*3/MM3 (ref 3.4–10.8)

## 2020-05-27 PROCEDURE — 97530 THERAPEUTIC ACTIVITIES: CPT

## 2020-05-27 PROCEDURE — 92526 ORAL FUNCTION THERAPY: CPT

## 2020-05-27 PROCEDURE — 94799 UNLISTED PULMONARY SVC/PX: CPT

## 2020-05-27 PROCEDURE — 99232 SBSQ HOSP IP/OBS MODERATE 35: CPT | Performed by: FAMILY MEDICINE

## 2020-05-27 PROCEDURE — 80048 BASIC METABOLIC PNL TOTAL CA: CPT | Performed by: INTERNAL MEDICINE

## 2020-05-27 PROCEDURE — 83735 ASSAY OF MAGNESIUM: CPT | Performed by: INTERNAL MEDICINE

## 2020-05-27 PROCEDURE — 84100 ASSAY OF PHOSPHORUS: CPT | Performed by: INTERNAL MEDICINE

## 2020-05-27 PROCEDURE — 82962 GLUCOSE BLOOD TEST: CPT

## 2020-05-27 PROCEDURE — 97110 THERAPEUTIC EXERCISES: CPT

## 2020-05-27 PROCEDURE — 25010000003 MAGNESIUM SULFATE 4 GM/100ML SOLUTION: Performed by: INTERNAL MEDICINE

## 2020-05-27 PROCEDURE — 85025 COMPLETE CBC W/AUTO DIFF WBC: CPT | Performed by: INTERNAL MEDICINE

## 2020-05-27 PROCEDURE — 92507 TX SP LANG VOICE COMM INDIV: CPT

## 2020-05-27 RX ADMIN — IPRATROPIUM BROMIDE AND ALBUTEROL SULFATE 3 ML: 2.5; .5 SOLUTION RESPIRATORY (INHALATION) at 19:33

## 2020-05-27 RX ADMIN — IPRATROPIUM BROMIDE AND ALBUTEROL SULFATE 3 ML: 2.5; .5 SOLUTION RESPIRATORY (INHALATION) at 07:55

## 2020-05-27 RX ADMIN — METOPROLOL TARTRATE 75 MG: 25 TABLET, FILM COATED ORAL at 08:50

## 2020-05-27 RX ADMIN — AMLODIPINE BESYLATE 5 MG: 5 TABLET ORAL at 08:50

## 2020-05-27 RX ADMIN — BUSPIRONE HYDROCHLORIDE 10 MG: 10 TABLET ORAL at 22:36

## 2020-05-27 RX ADMIN — ATORVASTATIN CALCIUM 80 MG: 40 TABLET, FILM COATED ORAL at 22:36

## 2020-05-27 RX ADMIN — SODIUM CHLORIDE, PRESERVATIVE FREE 10 ML: 5 INJECTION INTRAVENOUS at 08:51

## 2020-05-27 RX ADMIN — RIVAROXABAN 20 MG: 20 TABLET, FILM COATED ORAL at 18:29

## 2020-05-27 RX ADMIN — MAGNESIUM SULFATE HEPTAHYDRATE 4 G: 40 INJECTION, SOLUTION INTRAVENOUS at 09:20

## 2020-05-27 RX ADMIN — IPRATROPIUM BROMIDE AND ALBUTEROL SULFATE 3 ML: 2.5; .5 SOLUTION RESPIRATORY (INHALATION) at 16:09

## 2020-05-27 RX ADMIN — METOPROLOL TARTRATE 75 MG: 25 TABLET, FILM COATED ORAL at 22:37

## 2020-05-27 RX ADMIN — TRAZODONE HYDROCHLORIDE 50 MG: 50 TABLET ORAL at 22:36

## 2020-05-27 RX ADMIN — ASPIRIN 325 MG ORAL TABLET 325 MG: 325 PILL ORAL at 08:50

## 2020-05-27 RX ADMIN — SPIRONOLACTONE 25 MG: 25 TABLET ORAL at 08:50

## 2020-05-27 RX ADMIN — BUSPIRONE HYDROCHLORIDE 10 MG: 10 TABLET ORAL at 08:49

## 2020-05-27 RX ADMIN — VENLAFAXINE HYDROCHLORIDE 150 MG: 75 CAPSULE, EXTENDED RELEASE ORAL at 08:49

## 2020-05-28 LAB
ANION GAP SERPL CALCULATED.3IONS-SCNC: 11 MMOL/L (ref 5–15)
BACTERIA UR QL AUTO: ABNORMAL /HPF
BILIRUB UR QL STRIP: NEGATIVE
BUN BLD-MCNC: 13 MG/DL (ref 8–23)
BUN/CREAT SERPL: 22.8 (ref 7–25)
CALCIUM SPEC-SCNC: 8.6 MG/DL (ref 8.6–10.5)
CHLORIDE SERPL-SCNC: 103 MMOL/L (ref 98–107)
CLARITY UR: CLEAR
CO2 SERPL-SCNC: 24 MMOL/L (ref 22–29)
COLOR UR: YELLOW
CREAT BLD-MCNC: 0.57 MG/DL (ref 0.57–1)
DEPRECATED RDW RBC AUTO: 51.4 FL (ref 37–54)
ERYTHROCYTE [DISTWIDTH] IN BLOOD BY AUTOMATED COUNT: 13.8 % (ref 12.3–15.4)
GFR SERPL CREATININE-BSD FRML MDRD: 106 ML/MIN/1.73
GLUCOSE BLD-MCNC: 88 MG/DL (ref 65–99)
GLUCOSE BLDC GLUCOMTR-MCNC: 105 MG/DL (ref 70–130)
GLUCOSE BLDC GLUCOMTR-MCNC: 82 MG/DL (ref 70–130)
GLUCOSE BLDC GLUCOMTR-MCNC: 92 MG/DL (ref 70–130)
GLUCOSE BLDC GLUCOMTR-MCNC: 99 MG/DL (ref 70–130)
GLUCOSE UR STRIP-MCNC: NEGATIVE MG/DL
HCT VFR BLD AUTO: 45.3 % (ref 34–46.6)
HGB BLD-MCNC: 15.4 G/DL (ref 12–15.9)
HGB UR QL STRIP.AUTO: ABNORMAL
HYALINE CASTS UR QL AUTO: ABNORMAL /LPF
KETONES UR QL STRIP: NEGATIVE
LEUKOCYTE ESTERASE UR QL STRIP.AUTO: ABNORMAL
MAGNESIUM SERPL-MCNC: 1.8 MG/DL (ref 1.6–2.4)
MCH RBC QN AUTO: 34.5 PG (ref 26.6–33)
MCHC RBC AUTO-ENTMCNC: 34 G/DL (ref 31.5–35.7)
MCV RBC AUTO: 101.3 FL (ref 79–97)
NITRITE UR QL STRIP: POSITIVE
PH UR STRIP.AUTO: 7 [PH] (ref 5–8)
PLATELET # BLD AUTO: 229 10*3/MM3 (ref 140–450)
PMV BLD AUTO: 10.7 FL (ref 6–12)
POTASSIUM BLD-SCNC: 3.9 MMOL/L (ref 3.5–5.2)
PROT UR QL STRIP: ABNORMAL
RBC # BLD AUTO: 4.47 10*6/MM3 (ref 3.77–5.28)
RBC # UR: ABNORMAL /HPF
REF LAB TEST METHOD: ABNORMAL
SODIUM BLD-SCNC: 138 MMOL/L (ref 136–145)
SP GR UR STRIP: 1.02 (ref 1–1.03)
SQUAMOUS #/AREA URNS HPF: ABNORMAL /HPF
UROBILINOGEN UR QL STRIP: ABNORMAL
WBC NRBC COR # BLD: 7.33 10*3/MM3 (ref 3.4–10.8)
WBC UR QL AUTO: ABNORMAL /HPF

## 2020-05-28 PROCEDURE — 82962 GLUCOSE BLOOD TEST: CPT

## 2020-05-28 PROCEDURE — 87186 SC STD MICRODIL/AGAR DIL: CPT | Performed by: FAMILY MEDICINE

## 2020-05-28 PROCEDURE — 87086 URINE CULTURE/COLONY COUNT: CPT | Performed by: FAMILY MEDICINE

## 2020-05-28 PROCEDURE — 81001 URINALYSIS AUTO W/SCOPE: CPT | Performed by: FAMILY MEDICINE

## 2020-05-28 PROCEDURE — 99232 SBSQ HOSP IP/OBS MODERATE 35: CPT | Performed by: PHYSICIAN ASSISTANT

## 2020-05-28 PROCEDURE — 99232 SBSQ HOSP IP/OBS MODERATE 35: CPT | Performed by: FAMILY MEDICINE

## 2020-05-28 PROCEDURE — 83735 ASSAY OF MAGNESIUM: CPT | Performed by: INTERNAL MEDICINE

## 2020-05-28 PROCEDURE — 87077 CULTURE AEROBIC IDENTIFY: CPT | Performed by: FAMILY MEDICINE

## 2020-05-28 PROCEDURE — 92526 ORAL FUNCTION THERAPY: CPT

## 2020-05-28 PROCEDURE — 85027 COMPLETE CBC AUTOMATED: CPT | Performed by: FAMILY MEDICINE

## 2020-05-28 PROCEDURE — 94799 UNLISTED PULMONARY SVC/PX: CPT

## 2020-05-28 PROCEDURE — 80048 BASIC METABOLIC PNL TOTAL CA: CPT | Performed by: FAMILY MEDICINE

## 2020-05-28 PROCEDURE — 99232 SBSQ HOSP IP/OBS MODERATE 35: CPT | Performed by: NURSE PRACTITIONER

## 2020-05-28 PROCEDURE — 92507 TX SP LANG VOICE COMM INDIV: CPT

## 2020-05-28 RX ORDER — DIAPER,BRIEF,INFANT-TODD,DISP
EACH MISCELLANEOUS DAILY
Status: DISCONTINUED | OUTPATIENT
Start: 2020-05-28 | End: 2020-05-30 | Stop reason: HOSPADM

## 2020-05-28 RX ORDER — AMOXICILLIN 250 MG
2 CAPSULE ORAL 2 TIMES DAILY
Status: DISCONTINUED | OUTPATIENT
Start: 2020-05-28 | End: 2020-05-30 | Stop reason: HOSPADM

## 2020-05-28 RX ORDER — METOPROLOL TARTRATE 100 MG/1
100 TABLET ORAL 2 TIMES DAILY
Status: DISCONTINUED | OUTPATIENT
Start: 2020-05-28 | End: 2020-05-30 | Stop reason: HOSPADM

## 2020-05-28 RX ORDER — BISACODYL 10 MG
10 SUPPOSITORY, RECTAL RECTAL DAILY PRN
Status: DISCONTINUED | OUTPATIENT
Start: 2020-05-28 | End: 2020-05-30 | Stop reason: HOSPADM

## 2020-05-28 RX ADMIN — IPRATROPIUM BROMIDE AND ALBUTEROL SULFATE 3 ML: 2.5; .5 SOLUTION RESPIRATORY (INHALATION) at 15:32

## 2020-05-28 RX ADMIN — AMLODIPINE BESYLATE 5 MG: 5 TABLET ORAL at 08:43

## 2020-05-28 RX ADMIN — NYSTATIN 500000 UNITS: 100000 SUSPENSION ORAL at 22:24

## 2020-05-28 RX ADMIN — SENNOSIDES AND DOCUSATE SODIUM 2 TABLET: 8.6; 5 TABLET ORAL at 12:53

## 2020-05-28 RX ADMIN — SENNOSIDES AND DOCUSATE SODIUM 2 TABLET: 8.6; 5 TABLET ORAL at 22:25

## 2020-05-28 RX ADMIN — SODIUM CHLORIDE, PRESERVATIVE FREE 10 ML: 5 INJECTION INTRAVENOUS at 02:13

## 2020-05-28 RX ADMIN — IPRATROPIUM BROMIDE AND ALBUTEROL SULFATE 3 ML: 2.5; .5 SOLUTION RESPIRATORY (INHALATION) at 20:27

## 2020-05-28 RX ADMIN — IPRATROPIUM BROMIDE AND ALBUTEROL SULFATE 3 ML: 2.5; .5 SOLUTION RESPIRATORY (INHALATION) at 11:42

## 2020-05-28 RX ADMIN — BUSPIRONE HYDROCHLORIDE 10 MG: 10 TABLET ORAL at 22:25

## 2020-05-28 RX ADMIN — ATORVASTATIN CALCIUM 80 MG: 40 TABLET, FILM COATED ORAL at 22:25

## 2020-05-28 RX ADMIN — TRAZODONE HYDROCHLORIDE 50 MG: 50 TABLET ORAL at 22:25

## 2020-05-28 RX ADMIN — SODIUM CHLORIDE 75 ML/HR: 9 INJECTION, SOLUTION INTRAVENOUS at 14:21

## 2020-05-28 RX ADMIN — IPRATROPIUM BROMIDE AND ALBUTEROL SULFATE 3 ML: 2.5; .5 SOLUTION RESPIRATORY (INHALATION) at 06:39

## 2020-05-28 RX ADMIN — VENLAFAXINE HYDROCHLORIDE 150 MG: 75 CAPSULE, EXTENDED RELEASE ORAL at 08:42

## 2020-05-28 RX ADMIN — METOPROLOL TARTRATE 75 MG: 25 TABLET, FILM COATED ORAL at 08:43

## 2020-05-28 RX ADMIN — RIVAROXABAN 20 MG: 20 TABLET, FILM COATED ORAL at 18:54

## 2020-05-28 RX ADMIN — METOPROLOL TARTRATE 25 MG: 25 TABLET, FILM COATED ORAL at 10:05

## 2020-05-28 RX ADMIN — BACITRACIN: 500 OINTMENT TOPICAL at 12:53

## 2020-05-28 RX ADMIN — SPIRONOLACTONE 25 MG: 25 TABLET ORAL at 08:43

## 2020-05-28 RX ADMIN — SODIUM CHLORIDE 75 ML/HR: 9 INJECTION, SOLUTION INTRAVENOUS at 02:13

## 2020-05-28 RX ADMIN — SODIUM CHLORIDE, PRESERVATIVE FREE 10 ML: 5 INJECTION INTRAVENOUS at 22:26

## 2020-05-28 RX ADMIN — BUSPIRONE HYDROCHLORIDE 10 MG: 10 TABLET ORAL at 08:43

## 2020-05-28 RX ADMIN — SODIUM CHLORIDE, PRESERVATIVE FREE 10 ML: 5 INJECTION INTRAVENOUS at 08:44

## 2020-05-28 RX ADMIN — METOPROLOL TARTRATE 100 MG: 100 TABLET ORAL at 22:24

## 2020-05-29 LAB
ANION GAP SERPL CALCULATED.3IONS-SCNC: 12 MMOL/L (ref 5–15)
BUN BLD-MCNC: 11 MG/DL (ref 8–23)
BUN/CREAT SERPL: 20 (ref 7–25)
CALCIUM SPEC-SCNC: 9.1 MG/DL (ref 8.6–10.5)
CHLORIDE SERPL-SCNC: 104 MMOL/L (ref 98–107)
CO2 SERPL-SCNC: 23 MMOL/L (ref 22–29)
CREAT BLD-MCNC: 0.55 MG/DL (ref 0.57–1)
DEPRECATED RDW RBC AUTO: 50.1 FL (ref 37–54)
ERYTHROCYTE [DISTWIDTH] IN BLOOD BY AUTOMATED COUNT: 13.3 % (ref 12.3–15.4)
GFR SERPL CREATININE-BSD FRML MDRD: 111 ML/MIN/1.73
GLUCOSE BLD-MCNC: 86 MG/DL (ref 65–99)
GLUCOSE BLDC GLUCOMTR-MCNC: 104 MG/DL (ref 70–130)
GLUCOSE BLDC GLUCOMTR-MCNC: 105 MG/DL (ref 70–130)
GLUCOSE BLDC GLUCOMTR-MCNC: 160 MG/DL (ref 70–130)
GLUCOSE BLDC GLUCOMTR-MCNC: 92 MG/DL (ref 70–130)
HCT VFR BLD AUTO: 48.3 % (ref 34–46.6)
HGB BLD-MCNC: 16.3 G/DL (ref 12–15.9)
MAGNESIUM SERPL-MCNC: 1.7 MG/DL (ref 1.6–2.4)
MCH RBC QN AUTO: 34.1 PG (ref 26.6–33)
MCHC RBC AUTO-ENTMCNC: 33.7 G/DL (ref 31.5–35.7)
MCV RBC AUTO: 101 FL (ref 79–97)
PLATELET # BLD AUTO: 249 10*3/MM3 (ref 140–450)
PMV BLD AUTO: 10.6 FL (ref 6–12)
POTASSIUM BLD-SCNC: 3.8 MMOL/L (ref 3.5–5.2)
RBC # BLD AUTO: 4.78 10*6/MM3 (ref 3.77–5.28)
SODIUM BLD-SCNC: 139 MMOL/L (ref 136–145)
WBC NRBC COR # BLD: 7.63 10*3/MM3 (ref 3.4–10.8)

## 2020-05-29 PROCEDURE — 85027 COMPLETE CBC AUTOMATED: CPT | Performed by: FAMILY MEDICINE

## 2020-05-29 PROCEDURE — 94799 UNLISTED PULMONARY SVC/PX: CPT

## 2020-05-29 PROCEDURE — 92526 ORAL FUNCTION THERAPY: CPT

## 2020-05-29 PROCEDURE — 99233 SBSQ HOSP IP/OBS HIGH 50: CPT | Performed by: HOSPITALIST

## 2020-05-29 PROCEDURE — 80048 BASIC METABOLIC PNL TOTAL CA: CPT | Performed by: FAMILY MEDICINE

## 2020-05-29 PROCEDURE — 25010000002 CEFTRIAXONE PER 250 MG: Performed by: HOSPITALIST

## 2020-05-29 PROCEDURE — 25010000003 MAGNESIUM SULFATE 4 GM/100ML SOLUTION: Performed by: INTERNAL MEDICINE

## 2020-05-29 PROCEDURE — 92507 TX SP LANG VOICE COMM INDIV: CPT

## 2020-05-29 PROCEDURE — 83735 ASSAY OF MAGNESIUM: CPT | Performed by: INTERNAL MEDICINE

## 2020-05-29 PROCEDURE — 82962 GLUCOSE BLOOD TEST: CPT

## 2020-05-29 PROCEDURE — 63710000001 INSULIN LISPRO (HUMAN) PER 5 UNITS: Performed by: INTERNAL MEDICINE

## 2020-05-29 PROCEDURE — 97110 THERAPEUTIC EXERCISES: CPT

## 2020-05-29 PROCEDURE — 97530 THERAPEUTIC ACTIVITIES: CPT

## 2020-05-29 RX ORDER — FUROSEMIDE 40 MG/1
40 TABLET ORAL DAILY
Status: DISCONTINUED | OUTPATIENT
Start: 2020-05-29 | End: 2020-05-30 | Stop reason: HOSPADM

## 2020-05-29 RX ORDER — LISINOPRIL 10 MG/1
20 TABLET ORAL DAILY
Status: DISCONTINUED | OUTPATIENT
Start: 2020-05-29 | End: 2020-05-30 | Stop reason: HOSPADM

## 2020-05-29 RX ADMIN — NYSTATIN 500000 UNITS: 100000 SUSPENSION ORAL at 18:39

## 2020-05-29 RX ADMIN — BACITRACIN: 500 OINTMENT TOPICAL at 08:09

## 2020-05-29 RX ADMIN — IPRATROPIUM BROMIDE AND ALBUTEROL SULFATE 3 ML: 2.5; .5 SOLUTION RESPIRATORY (INHALATION) at 11:56

## 2020-05-29 RX ADMIN — INSULIN LISPRO 2 UNITS: 100 INJECTION, SOLUTION INTRAVENOUS; SUBCUTANEOUS at 21:56

## 2020-05-29 RX ADMIN — AMLODIPINE BESYLATE 5 MG: 5 TABLET ORAL at 08:11

## 2020-05-29 RX ADMIN — BUSPIRONE HYDROCHLORIDE 10 MG: 10 TABLET ORAL at 20:26

## 2020-05-29 RX ADMIN — IPRATROPIUM BROMIDE AND ALBUTEROL SULFATE 3 ML: 2.5; .5 SOLUTION RESPIRATORY (INHALATION) at 15:42

## 2020-05-29 RX ADMIN — TRAZODONE HYDROCHLORIDE 50 MG: 50 TABLET ORAL at 20:26

## 2020-05-29 RX ADMIN — CEFTRIAXONE 1 G: 1 INJECTION, POWDER, FOR SOLUTION INTRAMUSCULAR; INTRAVENOUS at 11:41

## 2020-05-29 RX ADMIN — ATORVASTATIN CALCIUM 80 MG: 40 TABLET, FILM COATED ORAL at 20:26

## 2020-05-29 RX ADMIN — NYSTATIN 500000 UNITS: 100000 SUSPENSION ORAL at 21:56

## 2020-05-29 RX ADMIN — IPRATROPIUM BROMIDE AND ALBUTEROL SULFATE 3 ML: 2.5; .5 SOLUTION RESPIRATORY (INHALATION) at 19:06

## 2020-05-29 RX ADMIN — SODIUM CHLORIDE, PRESERVATIVE FREE 10 ML: 5 INJECTION INTRAVENOUS at 08:10

## 2020-05-29 RX ADMIN — BUSPIRONE HYDROCHLORIDE 10 MG: 10 TABLET ORAL at 08:11

## 2020-05-29 RX ADMIN — SPIRONOLACTONE 25 MG: 25 TABLET ORAL at 08:11

## 2020-05-29 RX ADMIN — VENLAFAXINE HYDROCHLORIDE 150 MG: 75 CAPSULE, EXTENDED RELEASE ORAL at 08:10

## 2020-05-29 RX ADMIN — MAGNESIUM SULFATE HEPTAHYDRATE 4 G: 40 INJECTION, SOLUTION INTRAVENOUS at 14:35

## 2020-05-29 RX ADMIN — NYSTATIN 500000 UNITS: 100000 SUSPENSION ORAL at 11:40

## 2020-05-29 RX ADMIN — METOPROLOL TARTRATE 100 MG: 100 TABLET ORAL at 08:10

## 2020-05-29 RX ADMIN — RIVAROXABAN 20 MG: 20 TABLET, FILM COATED ORAL at 18:39

## 2020-05-29 RX ADMIN — SENNOSIDES AND DOCUSATE SODIUM 2 TABLET: 8.6; 5 TABLET ORAL at 20:26

## 2020-05-29 RX ADMIN — LISINOPRIL 20 MG: 10 TABLET ORAL at 10:42

## 2020-05-29 RX ADMIN — METOPROLOL TARTRATE 100 MG: 100 TABLET ORAL at 20:26

## 2020-05-29 RX ADMIN — FUROSEMIDE 40 MG: 40 TABLET ORAL at 10:42

## 2020-05-29 RX ADMIN — IPRATROPIUM BROMIDE AND ALBUTEROL SULFATE 3 ML: 2.5; .5 SOLUTION RESPIRATORY (INHALATION) at 07:36

## 2020-05-29 RX ADMIN — SENNOSIDES AND DOCUSATE SODIUM 2 TABLET: 8.6; 5 TABLET ORAL at 08:10

## 2020-05-29 RX ADMIN — NYSTATIN 500000 UNITS: 100000 SUSPENSION ORAL at 08:11

## 2020-05-30 VITALS
OXYGEN SATURATION: 93 % | DIASTOLIC BLOOD PRESSURE: 89 MMHG | BODY MASS INDEX: 28.53 KG/M2 | TEMPERATURE: 98.6 F | SYSTOLIC BLOOD PRESSURE: 128 MMHG | RESPIRATION RATE: 18 BRPM | HEART RATE: 98 BPM | WEIGHT: 167.11 LBS | HEIGHT: 64 IN

## 2020-05-30 PROBLEM — J96.01 ACUTE RESPIRATORY FAILURE WITH HYPOXIA (HCC): Status: RESOLVED | Noted: 2020-05-24 | Resolved: 2020-05-30

## 2020-05-30 LAB
ANION GAP SERPL CALCULATED.3IONS-SCNC: 12 MMOL/L (ref 5–15)
BACTERIA SPEC AEROBE CULT: ABNORMAL
BUN BLD-MCNC: 14 MG/DL (ref 8–23)
BUN/CREAT SERPL: 20 (ref 7–25)
CALCIUM SPEC-SCNC: 9.3 MG/DL (ref 8.6–10.5)
CHLORIDE SERPL-SCNC: 99 MMOL/L (ref 98–107)
CO2 SERPL-SCNC: 28 MMOL/L (ref 22–29)
CREAT BLD-MCNC: 0.7 MG/DL (ref 0.57–1)
GFR SERPL CREATININE-BSD FRML MDRD: 84 ML/MIN/1.73
GLUCOSE BLD-MCNC: 86 MG/DL (ref 65–99)
GLUCOSE BLDC GLUCOMTR-MCNC: 104 MG/DL (ref 70–130)
GLUCOSE BLDC GLUCOMTR-MCNC: 94 MG/DL (ref 70–130)
MAGNESIUM SERPL-MCNC: 2.2 MG/DL (ref 1.6–2.4)
POTASSIUM BLD-SCNC: 3.9 MMOL/L (ref 3.5–5.2)
SODIUM BLD-SCNC: 139 MMOL/L (ref 136–145)

## 2020-05-30 PROCEDURE — 82962 GLUCOSE BLOOD TEST: CPT

## 2020-05-30 PROCEDURE — 99239 HOSP IP/OBS DSCHRG MGMT >30: CPT | Performed by: HOSPITALIST

## 2020-05-30 PROCEDURE — 25010000002 CEFTRIAXONE PER 250 MG: Performed by: HOSPITALIST

## 2020-05-30 PROCEDURE — 83735 ASSAY OF MAGNESIUM: CPT | Performed by: HOSPITALIST

## 2020-05-30 PROCEDURE — 92507 TX SP LANG VOICE COMM INDIV: CPT

## 2020-05-30 PROCEDURE — 80048 BASIC METABOLIC PNL TOTAL CA: CPT | Performed by: HOSPITALIST

## 2020-05-30 RX ORDER — DIAPER,BRIEF,INFANT-TODD,DISP
EACH MISCELLANEOUS DAILY
Qty: 28.4 G | Refills: 0
Start: 2020-05-31 | End: 2020-01-01

## 2020-05-30 RX ORDER — BISACODYL 10 MG
10 SUPPOSITORY, RECTAL RECTAL DAILY PRN
Start: 2020-05-30 | End: 2020-01-01

## 2020-05-30 RX ORDER — IPRATROPIUM BROMIDE AND ALBUTEROL SULFATE 2.5; .5 MG/3ML; MG/3ML
3 SOLUTION RESPIRATORY (INHALATION) EVERY 4 HOURS PRN
Status: DISCONTINUED | OUTPATIENT
Start: 2020-05-30 | End: 2020-05-30 | Stop reason: HOSPADM

## 2020-05-30 RX ORDER — ATORVASTATIN CALCIUM 80 MG/1
80 TABLET, FILM COATED ORAL NIGHTLY
Qty: 30 TABLET | Refills: 0 | Status: SHIPPED | OUTPATIENT
Start: 2020-05-30 | End: 2020-01-01 | Stop reason: HOSPADM

## 2020-05-30 RX ORDER — AMOXICILLIN 250 MG
2 CAPSULE ORAL 2 TIMES DAILY
Start: 2020-05-30 | End: 2021-01-01

## 2020-05-30 RX ORDER — CEFDINIR 300 MG/1
300 CAPSULE ORAL 2 TIMES DAILY
Qty: 10 CAPSULE | Refills: 0
Start: 2020-05-30 | End: 2020-06-04

## 2020-05-30 RX ORDER — METOPROLOL TARTRATE 100 MG/1
100 TABLET ORAL 2 TIMES DAILY
Qty: 60 TABLET | Refills: 0 | Status: SHIPPED | OUTPATIENT
Start: 2020-05-30 | End: 2020-01-01 | Stop reason: HOSPADM

## 2020-05-30 RX ADMIN — SPIRONOLACTONE 25 MG: 25 TABLET ORAL at 09:24

## 2020-05-30 RX ADMIN — METOPROLOL TARTRATE 100 MG: 100 TABLET ORAL at 09:24

## 2020-05-30 RX ADMIN — CEFTRIAXONE 1 G: 1 INJECTION, POWDER, FOR SOLUTION INTRAMUSCULAR; INTRAVENOUS at 09:24

## 2020-05-30 RX ADMIN — SODIUM CHLORIDE, PRESERVATIVE FREE 10 ML: 5 INJECTION INTRAVENOUS at 12:03

## 2020-05-30 RX ADMIN — FUROSEMIDE 40 MG: 40 TABLET ORAL at 09:24

## 2020-05-30 RX ADMIN — SENNOSIDES AND DOCUSATE SODIUM 2 TABLET: 8.6; 5 TABLET ORAL at 09:24

## 2020-05-30 RX ADMIN — BUSPIRONE HYDROCHLORIDE 10 MG: 10 TABLET ORAL at 09:24

## 2020-05-30 RX ADMIN — LISINOPRIL 20 MG: 10 TABLET ORAL at 09:24

## 2020-05-30 RX ADMIN — BACITRACIN: 500 OINTMENT TOPICAL at 09:25

## 2020-05-30 RX ADMIN — NYSTATIN 500000 UNITS: 100000 SUSPENSION ORAL at 12:02

## 2020-05-30 RX ADMIN — VENLAFAXINE HYDROCHLORIDE 150 MG: 75 CAPSULE, EXTENDED RELEASE ORAL at 09:24

## 2020-05-30 RX ADMIN — NYSTATIN 500000 UNITS: 100000 SUSPENSION ORAL at 09:24

## 2020-06-01 NOTE — PAYOR COMM NOTE
"Anna Ochoa, RN Utilization Review [phone]  Fax # [phone]  Ref # [id number]  Please note discharge date.      Opal Thorne (64 y.o. Female)     Date of Birth Social Security Number Address Home Phone MRN    1955  [address] [place] [address] [phone] [id number]    Alevism Marital Status          None        Admission Date Admission Type Admitting Provider Attending Provider Department, Room/Bed    5/23/20 Emergency Tess Huynh MD  [facility] 3F, S322/1    Discharge Date Discharge Disposition Discharge Destination        5/30/2020 Rehab Facility or Unit (DC - External)              Attending Provider:  (none)   Allergies:  Msg [Monosodium Glutamate]    Isolation:  None   Infection:  None   Code Status:  Prior    Ht:  162.6 cm (64\")   Wt:  75.8 kg (167 lb 1.7 oz)    Admission Cmt:  None   Principal Problem:  None                Active Insurance as of 5/23/2020     Primary Coverage     Payor Plan Insurance Group Employer/Plan Group    ANTHEM BLUE CROSS ANTHEM BLUE CROSS BLUE SHIELD PPO [id number]     Payor Plan Address Payor Plan Phone Number Payor Plan Fax Number Effective Dates    [address] [phone]  1/8/2012 - None Entered    [facility] [id number]       Subscriber Name Subscriber Birth Date Member ID       ELVIRA THORNE 7/9/1959 [id number]           Secondary Coverage     Payor Plan Insurance Group Employer/Plan Group    MEDICARE MEDICARE A ONLY      Payor Plan Address Payor Plan Phone Number Payor Plan Fax Number Effective Dates    [address] [phone]  10/1/2018 - None Entered    [facility] [id number]       Subscriber Name Subscriber Birth Date Member ID       OPAL THORNE 1955 [id number]                 Emergency Contacts      (Rel.) Home Phone Work Phone Mobile Phone    thad dodd (Son) [phone] -- --    Iván Vasquez (Son) -- -- [phone]    elvira lópez (Spouse) -- -- [phone]              "   Discharge Summary      Tess Huynh MD at 20 1030              [facility]  DISCHARGE SUMMARY    Patient Name: Lynda Thorne  : 1955  MRN: [id number]    Date of Admission: 2020  3:40 PM  Date of Discharge: May 30, 2020    Primary Care Physician: Otoniel Babin MD    Consults     Date and Time Order Name Status Description    2020 0030 Inpatient Cardiology Consult Completed           Hospital Course     Presenting Problem:   Acute CVA (cerebrovascular accident) (CMS/HCC) [I63.9]    Active Hospital Problems    Diagnosis  POA   • Paroxysmal atrial fibrillation (CMS/HCC) [I48.0]  Yes   • Essential hypertension [I10]  Yes   • Acute CVA (cerebrovascular accident) (CMS/HCC) [I63.9]  Yes      Resolved Hospital Problems    Diagnosis Date Resolved POA   • Acute respiratory failure with hypoxia (CMS/HCC) [J96.01] 2020 Clinically Undetermined          Hospital Course:  Lynda Thorne is a 64 y.o. female with a history of high blood pressure, depression, asthma, atrial fibrillation on Eliquis, upper extremity ischemia presenting with left eyes gaze deviation, right hemiparesis and severe expressive aphasia.  She was not a candidate for TPA because it has been greater than 4.5 hours from onset of symptoms and she was on Eliquis.  CT scan of the head was negative.  CTA revealed occlusion of her distal M1 on the left.  She also had moderate stenosis of her M2 branches and her left internal carotid artery.  CT perfusion revealed a central core infarct with peripheral reversible ischemia.  She was taken to the lab for thrombectomy of her left middle cerebral artery. Pt was in ICU afterwards and transferred to telemetry on .  Her clinical course remained stable until she was discharged to short-term rehab at [facility] on .     Hospital problem list/plans:     CVA while on Eliquis s/p mechanical thrombectomy   Atrial  Fibrillation  -Metoprolol increased to 100mg BID by cardiology  -Cardiology/EP has seen, recommends to continue Xarelto and will follow-up with Dr. Lugo in 4-6 weeks to reassess need for JUSTINE closure, appointment set up for 7/6 at 1: 15 p.m.  -Hold ASA per NSGY recs  -Will need follow-up with NSGY (Daniel Lopez PA-C) in 90 days     HTN  -Well-controlled at the home medications were resumed.       Depression  -Continue Effexor and Buspar  -Home trazodone discontinued due to major drug drug interaction with Effexor     Proteus Mirabilis UTI  -Sensitive to Rocephin.  Patient will be discharged on Omnicef for 5 more days.       Constipation   -Stool softeners/laxative as needed       Discharge Follow Up Recommendations for outpatient labs/diagnostics:      Day of Discharge     HPI:   No acute events overnight.  Nursing chart reviewed.  No new concerns from nursing staff.  Patient is sitting up in her bed, alert and appropriate.  She is answering simple questions appropriately and following commands.  She denies any chest pain, shortness of air, cough, fever, or chills.  She denies any other complaints at this time.    Review of Systems  All systems reviewed, but somewhat limited as listed above due to some expressive and receptive aphasia.    Vital Signs:   Temp:  [97.4 °F (36.3 °C)-98.2 °F (36.8 °C)] 98 °F (36.7 °C)  Heart Rate:  [] 102  Resp:  [16-18] 16  BP: (107-133)/(76-92) 126/84     Physical Exam:  Constitutional: No acute cardiopulmonary distress.  Patient is nontoxic-appearing   HENT: NCAT, mucous membranes moist  Respiratory: Clear to auscultation bilaterally, respiratory effort normal   Cardiovascular: Irregularly irregular rhythm, regular rate, S1-S2 normal   Gastrointestinal: Positive bowel sounds, soft, nontender, nondistended  Musculoskeletal: No bilateral ankle edema  Psychiatric: Flat affect, cooperative  Neurologic: Unable to move R arm or leg,  +some expressive and receptive aphasia   Skin: No  carola    Pertinent  and/or Most Recent Results     Results from last 7 days   Lab Units 05/30/20  0557 05/29/20  0641 05/28/20  1035 05/27/20  0422 05/26/20 0419 05/24/20 0446 05/23/20 1600 05/23/20  1556   WBC 10*3/mm3  --  7.63 7.33 8.86 9.68 10.67 11.51*  --    HEMOGLOBIN g/dL  --  16.3* 15.4 15.2 16.6* 16.0* 17.7*  --    HEMOGLOBIN, POC g/dL  --   --   --   --   --   --   --  17.7*   HEMATOCRIT %  --  48.3* 45.3 44.9 49.8* 48.7* 51.8*  --    HEMATOCRIT POC %  --   --   --   --   --   --   --  52*   PLATELETS 10*3/mm3  --  249 229 206 206 223 234  --    SODIUM mmol/L 139 139 138 138 139 142  --   --    POTASSIUM mmol/L 3.9 3.8 3.9 3.8 3.6 4.1  --   --    CHLORIDE mmol/L 99 104 103 103 101 104  --   --    CO2 mmol/L 28.0 23.0 24.0 24.0 25.0 26.0  --   --    BUN mg/dL 14 11 13 10 8 21  --   --    CREATININE mg/dL 0.70 0.55* 0.57 0.52* 0.50* 0.75  --  1.10   GLUCOSE mg/dL 86 86 88 97 84 98  --   --    CALCIUM mg/dL 9.3 9.1 8.6 8.5* 8.8 8.7  --   --      Results from last 7 days   Lab Units 05/24/20 0446 05/23/20 1600 05/23/20  1556   BILIRUBIN mg/dL 0.7  --   --    ALK PHOS U/L 77  --   --    ALT (SGPT) U/L 23 26  --    AST (SGOT) U/L 28 30  --    PROTIME seconds  --   --  15.2   INR   --   --  1.3*   APTT seconds  --  29.2  --      Results from last 7 days   Lab Units 05/24/20 0446   CHOLESTEROL mg/dL 128   TRIGLYCERIDES mg/dL 64   HDL CHOL mg/dL 52     Results from last 7 days   Lab Units 05/24/20  0446 05/23/20  1600   HEMOGLOBIN A1C % 6.10*  --    TROPONIN T ng/mL  --  <0.010       Brief Urine Lab Results  (Last result in the past 365 days)      Color   Clarity   Blood   Leuk Est   Nitrite   Protein   CREAT   Urine HCG        05/28/20 1450 Yellow Clear Large (3+) Moderate (2+) Positive 100 mg/dL (2+)               Microbiology Results Abnormal     Procedure Component Value - Date/Time    Urine Culture - Urine, Urine, Catheter In/Out [[id number]]  (Abnormal)  (Susceptibility) Collected:  05/28/20 1450     Lab Status:  Final result Specimen:  Urine, Catheter In/Out Updated:  05/30/20 0914     Urine Culture >100,000 CFU/mL Proteus mirabilis    Susceptibility      Proteus mirabilis     VIRGEN     Ampicillin Susceptible     Ampicillin + Sulbactam Susceptible     Cefazolin Susceptible     Cefepime Susceptible     Ceftazidime Susceptible     Ceftriaxone Susceptible     Gentamicin Susceptible     Levofloxacin Susceptible     Nitrofurantoin Resistant     Piperacillin + Tazobactam Susceptible     Tetracycline Resistant     Trimethoprim + Sulfamethoxazole Susceptible                    COVID-19,CEPHEID,JOHN IN-HOUSE(OR EMERGENT/ADD-ON),NP SWAB IN TRANSPORT MEDIA 3-4 HR TAT - Swab, Nasopharynx [[id number]]  (Normal) Collected:  05/23/20 1612    Lab Status:  Final result Specimen:  Swab from Nasopharynx Updated:  05/23/20 1711     COVID19 Not Detected          Imaging Results (All)     Procedure Component Value Units Date/Time    CT Angiogram Head [[id number]] Collected:  05/23/20 1630     Updated:  05/26/20 0952    Narrative:       EXAMINATION: CT ANGIOGRAM HEAD, CT ANGIOGRAM NECK - 05/23/2020      INDICATION: Right-sided weakness, flaccid. Evaluate for stroke.     TECHNIQUE: Multiple axial CT imaging is obtained of the head and neck  following the administration of intravenous contrast. 3D reformatted  images were submitted to further facilitate diagnostic accuracy and  treatment planning.     Stenosis measurement was performed by the NASCET or similar method.     The radiation dose reduction device was turned on for each scan per the  ALARA (As Low as Reasonably Achievable) protocol.     COMPARISON: NONE     FINDINGS: The lung apices are grossly clear. There is prominence  identified of the left vertebral artery. The right vertebral artery is  extremely small in caliber. The common carotid arteries reveal no  significant stenosis with atherosclerotic disease at the proximal left  common carotid artery. The carotid bifurcations  reveal atherosclerotic  disease bilaterally. There is moderate stenosis identified of the  proximal left internal carotid artery. The right internal carotid artery  is unremarkable with only mild atherosclerotic disease at the proximal  portion. Tortuosity of the intracranial internal carotid arteries with  atherosclerotic disease of the cavernous portions. There is an occlusion  identified at the distal M1 segment on the right. There is lack of  contrast seen in the M2 branches. The anterior cerebral arteries are  both patent and unremarkable. The left M1 and M2 branches are patent.     The posterior circulation reveals the basilar artery to be unremarkable.  Again tiny vertebral artery on the right is seen forming the right  basilar artery. There is no gross abnormality in the posterior cerebral  arteries.     Visualized paranasal sinuses are grossly clear. Globes and orbits are  intact. The mastoid air cells are patent. The soft tissue neck is  unremarkable. Salivary glands within normal limits. The thyroid is  homogeneous. No bulky adenopathy.       Impression:       There is an occlusion identified of the distal M1 segment on  the left with minimal to no contrast seen within the M2 branches on the  left. Remainder of the intracranial vascularity is grossly unremarkable.  There is moderate stenosis of the proximal left internal carotid artery.  Dominant left vertebral artery.     DICTATED:   05/23/2020  EDITED/ls :   05/24/2020      This report was finalized on 5/26/2020 9:49 AM by Dr. Ana M Barkley MD.       CT Angiogram Neck [[id number]] Collected:  05/23/20 1630     Updated:  05/26/20 0952    Narrative:       EXAMINATION: CT ANGIOGRAM HEAD, CT ANGIOGRAM NECK - 05/23/2020      INDICATION: Right-sided weakness, flaccid. Evaluate for stroke.     TECHNIQUE: Multiple axial CT imaging is obtained of the head and neck  following the administration of intravenous contrast. 3D reformatted  images were submitted  to further facilitate diagnostic accuracy and  treatment planning.     Stenosis measurement was performed by the NASCET or similar method.     The radiation dose reduction device was turned on for each scan per the  ALARA (As Low as Reasonably Achievable) protocol.     COMPARISON: NONE     FINDINGS: The lung apices are grossly clear. There is prominence  identified of the left vertebral artery. The right vertebral artery is  extremely small in caliber. The common carotid arteries reveal no  significant stenosis with atherosclerotic disease at the proximal left  common carotid artery. The carotid bifurcations reveal atherosclerotic  disease bilaterally. There is moderate stenosis identified of the  proximal left internal carotid artery. The right internal carotid artery  is unremarkable with only mild atherosclerotic disease at the proximal  portion. Tortuosity of the intracranial internal carotid arteries with  atherosclerotic disease of the cavernous portions. There is an occlusion  identified at the distal M1 segment on the right. There is lack of  contrast seen in the M2 branches. The anterior cerebral arteries are  both patent and unremarkable. The left M1 and M2 branches are patent.     The posterior circulation reveals the basilar artery to be unremarkable.  Again tiny vertebral artery on the right is seen forming the right  basilar artery. There is no gross abnormality in the posterior cerebral  arteries.     Visualized paranasal sinuses are grossly clear. Globes and orbits are  intact. The mastoid air cells are patent. The soft tissue neck is  unremarkable. Salivary glands within normal limits. The thyroid is  homogeneous. No bulky adenopathy.       Impression:       There is an occlusion identified of the distal M1 segment on  the left with minimal to no contrast seen within the M2 branches on the  left. Remainder of the intracranial vascularity is grossly unremarkable.  There is moderate stenosis of the  proximal left internal carotid artery.  Dominant left vertebral artery.     DICTATED:   05/23/2020  EDITED/ls :   05/24/2020      This report was finalized on 5/26/2020 9:49 AM by Dr. Ana M Barkley MD.       CT Cerebral Perfusion With & Without Contrast [[id number]] Collected:  05/23/20 1628     Updated:  05/26/20 0952    Narrative:       EXAMINATION: CT CEREBRAL PERFUSION WWO CONTRAST - 05/23/2020     INDICATION: Right side flaccidity. Evaluate for stroke.     TECHNIQUE: Cerebral perfusion analysis was performed using computed  tomography with contrast administration, including post processing of  parametric maps with determination of cerebral blood flow, cerebral  blood volume, and mean transit time.      The radiation dose reduction device was turned on for each scan per the  ALARA (As Low as Reasonably Achievable) protocol.     COMPARISON: NONE     FINDINGS: There is abnormal decreased cerebral blood flow identified  within the left MCA territory. There is decreased cerebral blood volume  as well suggesting central area of infarction with some reversible  ischemia identified in its periphery. There is increased mean transient  time to suggest evidence of an occlusion or stenosis within the left MCA  territory.       Impression:       There is central core infarction with peripheral reversible  ischemia in the left middle cerebral artery territory. Findings to  suggest evidence of an occlusion or stenosis within the left MCA.     DICTATED:   05/23/2020  EDITED/ls :   05/24/2020      This report was finalized on 5/26/2020 9:49 AM by Dr. Ana M Barkley MD.       CT Head Without Contrast Stroke Protocol [[id number]] Collected:  05/23/20 1555     Updated:  05/26/20 0952    Narrative:       EXAMINATION: CT HEAD WO CONTRAST       INDICATION: Right side flaccidity, possible CVA. Evaluate for stroke.     TECHNIQUE: Lateral axial CT imaging is obtained of the head from skull  base to skull vertex without the  administration of intravenous contrast.  Stroke protocol.     The radiation dose reduction device was turned on for each scan per the  ALARA (As Low as Reasonably Achievable) protocol.     COMPARISON: 5/14/2019     FINDINGS: Physiologic calcifications in the basal ganglia bilaterally.  No hemorrhage or hydrocephalus. No mass, mass effect, or midline shift.  Dystrophic calcification seen within the right periventricular white  matter posteriorly. No hemorrhage or hydrocephalus. No mass, mass  effect, midline shift. No abnormal extra-axial fluid collections  identified. Bony structures reveal no evidence of osseous abnormality.  Visualized paranasal sinuses are clear. The mastoid air cells are  patent.       Impression:       Stable chronic changes seen within the brain with no acute  intracranial abnormality.     DICTATED:   05/23/2020  EDITED/ls :   05/24/2020      Examination was performed 05/23/2020 at 3:43 PM in examination results  were given to the emergency room physician 05/23/2020 at 3:53 PM        This report was finalized on 5/26/2020 9:49 AM by Dr. Ana M Barkley MD.       CT Head Without Contrast [[id number]] Collected:  05/25/20 1557     Updated:  05/25/20 1620    Narrative:       EXAMINATION: CT HEAD WO CONTRAST-05/25/2020:      INDICATION: CVA; I63.9-Cerebral infarction, unspecified; Z74.09-Other  reduced mobility; R13.11-Dysphagia, oral phase; R47.01-Aphasia.      TECHNIQUE: CT head without intravenous contrast.     The radiation dose reduction device was turned on for each scan per the  ALARA (As Low as Reasonably Achievable) protocol.     COMPARISON: CT dated 05/24/2020.     FINDINGS: The midline structures are symmetric without evidence of mass,  mass effect or midline shift. Stable area of rounded calcification in  the right frontal lobe from prior without intra-axial hemorrhage or  extra-axial fluid collection. No midline shift or hydrocephalus. Globes  and orbits unremarkable. The  visualized paranasal sinuses and mastoid  air cells are grossly clear and well pneumatized. Calvarium intact.       Impression:       Stable appearance from 05/24/2020 one day prior examination  without midline shift, hydrocephalus or intra-axial hemorrhage.     D:  05/25/2020  E:  05/25/2020     This report was finalized on 5/25/2020 4:17 PM by Dr. Julio C Rodríguez.       XR Chest 1 View [[id number]] Collected:  05/25/20 1435     Updated:  05/25/20 1620    Narrative:       EXAMINATION: XR CHEST 1 VW-05/25/2020:      INDICATION: Worsening hypoxia, tobacco abuse; I63.9-Cerebral infarction,  unspecified; Z74.09-Other reduced mobility; R13.11-Dysphagia, oral  phase; R47.01-Aphasia.      COMPARISON: Chest x-ray dated 05/23/2020.     FINDINGS: Cardiac size within normal limits. Pulmonary vascularity  within normal limits. No focal opacification or consolidation. No  pneumothorax or pleural effusion. Degenerative changes of the spine.           Impression:       No acute cardiopulmonary process.     D:  05/25/2020  E:  05/25/2020     This report was finalized on 5/25/2020 4:17 PM by Dr. Julio C Rodríguez.       CT Head Without Contrast [[id number]] Collected:  05/24/20 1356     Updated:  05/25/20 0856    Narrative:       EXAMINATION: CT HEAD WO CONTRAST-      INDICATION: Decreased alertness; I63.9-Cerebral infarction, unspecified;  Z74.09-Other reduced mobility; R13.11-Dysphagia, oral phase;  R47.01-Aphasia.     TECHNIQUE: 5 mm unenhanced images through the brain.     The radiation dose reduction device was turned on for each scan per the  ALARA (As Low as Reasonably Achievable) protocol.     COMPARISON: 05/23/2020 head CT scan.     FINDINGS: By report, yesterday's study showed no acute disease. History  today indicates thrombectomy, followup.     The calvarium appears intact. Included paranasal sinuses and mastoids  appear clear. Soft tissue window images show pineal gland calcification,  choroid plexus calcification and a small  "calcification in the roof of  the right lateral ventricle. There is no evidence of intracranial  hemorrhage, mass or mass effect, hydrocephalus, or abnormal extraaxial  collection.  There is a mildly indistinct appearance of the left insular  cortex, or so-called \"insular ribbon sign\", probably mild residual edema  from patient's previous event. This appears confined to the cortex and  is difficult to appreciate except in contrast to the normal, right side.       Impression:       Subtle edema of the left insular cortex. No evidence of  acute infarction elsewhere. No evidence of intracranial hemorrhage or  other acute disease.     D:  05/24/2020  E:  05/25/2020     This report was finalized on 5/25/2020 8:53 AM by Dr. Matt Lauren MD.       XR Chest 1 View [[id number]] Collected:  05/23/20 2001     Updated:  05/23/20 2004    Narrative:       PROCEDURE: CR Chest 1 Vw    INDICATIONS: stroke, hypercapnea, r/o aspiration / copd;Cerebral infarction, unspecified New admit   stroke, hypercapnea, r/o aspiration / copd      PPE surgical mask,gloves    COMPARISON:  5/14/2019     TECHNIQUE: Single AP  view of the chest    FINDINGS:  Cardiomediastinal silhouette is within normal limits.  Lungs are relatively well inflated and clear of any infiltrate patchy prominent areas of interstitial markings seen in the left lung, nonspecific.. Osseous structures are intact.      Impression:       No acute disease.           Signer Name: Alda Rader MD   Signed: 5/23/2020 7:01 PM   Workstation Name: [id number]    [facility]                    Results for orders placed during the hospital encounter of 05/23/20   Adult Transthoracic Echo Complete W/ Cont if Necessary Per Protocol (With Agitated Saline)    Narrative · Mild mitral valve regurgitation is present.  · Mild tricuspid valve regurgitation is present.  · Calculated right ventricular systolic pressure from tricuspid   regurgitation is 30 mmHg.  · Estimated EF " = 66%.  · Left ventricular systolic function is normal.  · Normal right ventricular cavity size, wall thickness, systolic function   and septal motion noted.  · Left ventricular diastolic function is normal.  · No evidence of a patent foramen ovale.  · Mild MAC is present.  · No evidence of pulmonary hypertension is present.  · There is no evidence of pericardial effusion.          Plan for Follow-up of Pending Labs/Results:     Discharge Details        Discharge Medications      New Medications      Instructions Start Date   atorvastatin 80 MG tablet  Commonly known as:  LIPITOR   80 mg, Oral, Nightly      bacitracin 500 UNIT/GM ointment   Topical, Daily   Start Date:  May 31, 2020     bisacodyl 10 MG suppository  Commonly known as:  DULCOLAX   10 mg, Rectal, Daily PRN      cefdinir 300 MG capsule  Commonly known as:  OMNICEF   300 mg, Oral, 2 Times Daily      insulin lispro 100 UNIT/ML injection  Commonly known as:  humaLOG   0-7 Units, Subcutaneous, 4 Times Daily With Meals & Nightly      rivaroxaban 20 MG tablet  Commonly known as:  XARELTO   20 mg, Oral, Daily With Dinner      sennosides-docusate 8.6-50 MG per tablet  Commonly known as:  PERICOLACE   2 tablets, Oral, 2 Times Daily         Changes to Medications      Instructions Start Date   metoprolol tartrate 100 MG tablet  Commonly known as:  LOPRESSOR  What changed:    · medication strength  · how much to take   100 mg, Oral, 2 Times Daily         Continue These Medications      Instructions Start Date   albuterol sulfate  (90 Base) MCG/ACT inhaler  Commonly known as:  PROVENTIL HFA;VENTOLIN HFA;PROAIR HFA   2 puffs, Inhalation, Every 4 Hours PRN      aspirin 81 MG EC tablet   81 mg, Oral, Daily      busPIRone 10 MG tablet  Commonly known as:  BUSPAR   10 mg, Oral, 2 Times Daily      venlafaxine  MG 24 hr capsule  Commonly known as:  EFFEXOR-XR   150 mg, Oral, Daily         Stop These Medications    amLODIPine 5 MG tablet  Commonly known as:   NORVASC     apixaban 5 MG tablet tablet  Commonly known as:  ELIQUIS     furosemide 40 MG tablet  Commonly known as:  LASIX     lisinopril 20 MG tablet  Commonly known as:  PRINIVIL,ZESTRIL     spironolactone 25 MG tablet  Commonly known as:  ALDACTONE     traZODone 50 MG tablet  Commonly known as:  DESYREL            Allergies   Allergen Reactions   • Msg [Monosodium Glutamate] Shortness Of Breath and Arrhythmia     Reported by pt's son         Discharge Disposition:  Rehab Facility or Unit (DC - External)    Diet:  Hospital:  Diet Order   Procedures   • Diet Dysphagia; III - Pureed With Some Mashed; Thin; Cardiac       Activity:  As to    Restrictions or Other Recommendations:         CODE STATUS:    Code Status and Medical Interventions:   Ordered at: 05/23/20 4113     Code Status:    CPR     Medical Interventions (Level of Support Prior to Arrest):    Full       Future Appointments   Date Time Provider Department Center   7/6/2020  1:15 PM Earnest Lugo DO [facility] None       Additional Instructions for the Follow-ups that You Need to Schedule     Discharge Follow-up with PCP   As directed       Currently Documented PCP:    Otoniel Babin MD    PCP Phone Number:    [phone]     Follow Up Details:  Please call for an appointment to be seen after patient is discharged from rehab         Discharge Follow-up with Specialty: Cardiology/EP   As directed      Specialty:  Cardiology/EP    Follow Up Details:  Please call for appointment after patient's discharge from rehab         Discharge Follow-up with Specialty: Neurosurgery; 3 Months   As directed      Specialty:  Neurosurgery    Follow Up:  3 Months    Follow Up Details:  Please call for an appointment               Time Spent on Discharge: 50 minutes    Electronically signed by Tess Huynh MD, 05/30/20, 10:32 AM.        Electronically signed by Tess Huynh MD at 05/30/20 1050       Discharge Order (From admission, onward)      Start     Ordered    05/30/20 1024  Discharge patient  Once     Expected Discharge Date:  05/30/20    Discharge Disposition:  Rehab Facility or Unit (DC - External)    Physician of Record for Attribution - Please select from Treatment Team:  DUONG CENTENO [[id number]]    Review needed by CMO to determine Physician of Record:  No       Question Answer Comment   Physician of Record for Attribution - Please select from Treatment Team DUONG CENTENO    Review needed by CMO to determine Physician of Record No        05/30/20 1030

## 2020-06-19 NOTE — OUTREACH NOTE
Prep Survey      Responses   [facility] discharged from?  [place]   Is LACE score < 7 ?  No   Eligibility  Readm Mgmt   Discharge diagnosis  Acute CVA,  s/p mechanical thrombectomy    Does the patient have one of the following disease processes/diagnoses(primary or secondary)?  Stroke (TIA)   Does the patient have Home health ordered?  No   Is there a DME ordered?  No   General alerts for this patient  DC from rehab on 6/18   Prep survey completed?  Yes          Yoana Kelly RN

## 2020-06-23 ENCOUNTER — HOSPITAL ENCOUNTER (EMERGENCY)
Age: 65
Discharge: ANOTHER HOSPITAL | End: 2020-06-23
Payer: COMMERCIAL

## 2020-06-23 VITALS
TEMPERATURE: 98.1 F | SYSTOLIC BLOOD PRESSURE: 192 MMHG | HEART RATE: 115 BPM | DIASTOLIC BLOOD PRESSURE: 106 MMHG | OXYGEN SATURATION: 97 % | RESPIRATION RATE: 30 BRPM

## 2020-06-23 VITALS
OXYGEN SATURATION: 98 % | HEART RATE: 115 BPM | SYSTOLIC BLOOD PRESSURE: 159 MMHG | DIASTOLIC BLOOD PRESSURE: 109 MMHG | TEMPERATURE: 98 F | RESPIRATION RATE: 22 BRPM

## 2020-06-23 VITALS — OXYGEN SATURATION: 94 % | SYSTOLIC BLOOD PRESSURE: 155 MMHG | DIASTOLIC BLOOD PRESSURE: 92 MMHG | HEART RATE: 104 BPM

## 2020-06-23 VITALS
DIASTOLIC BLOOD PRESSURE: 116 MMHG | RESPIRATION RATE: 26 BRPM | SYSTOLIC BLOOD PRESSURE: 149 MMHG | HEART RATE: 82 BPM | OXYGEN SATURATION: 90 %

## 2020-06-23 VITALS
HEART RATE: 115 BPM | RESPIRATION RATE: 32 BRPM | DIASTOLIC BLOOD PRESSURE: 106 MMHG | SYSTOLIC BLOOD PRESSURE: 192 MMHG | TEMPERATURE: 98.06 F

## 2020-06-23 VITALS — HEART RATE: 109 BPM | SYSTOLIC BLOOD PRESSURE: 153 MMHG | RESPIRATION RATE: 28 BRPM | DIASTOLIC BLOOD PRESSURE: 97 MMHG

## 2020-06-23 VITALS — SYSTOLIC BLOOD PRESSURE: 159 MMHG | DIASTOLIC BLOOD PRESSURE: 107 MMHG | HEART RATE: 104 BPM

## 2020-06-23 VITALS — HEART RATE: 118 BPM | DIASTOLIC BLOOD PRESSURE: 112 MMHG | SYSTOLIC BLOOD PRESSURE: 156 MMHG | OXYGEN SATURATION: 97 %

## 2020-06-23 VITALS — BODY MASS INDEX: 29 KG/M2

## 2020-06-23 DIAGNOSIS — R29.703: ICD-10-CM

## 2020-06-23 DIAGNOSIS — K21.9: ICD-10-CM

## 2020-06-23 DIAGNOSIS — J44.9: ICD-10-CM

## 2020-06-23 DIAGNOSIS — I10: ICD-10-CM

## 2020-06-23 DIAGNOSIS — G83.11: ICD-10-CM

## 2020-06-23 DIAGNOSIS — N30.00: ICD-10-CM

## 2020-06-23 DIAGNOSIS — F17.210: ICD-10-CM

## 2020-06-23 DIAGNOSIS — I63.89: Primary | ICD-10-CM

## 2020-06-23 DIAGNOSIS — R47.1: ICD-10-CM

## 2020-06-23 DIAGNOSIS — I48.11: ICD-10-CM

## 2020-06-23 PROBLEM — I63.9 CVA (CEREBRAL VASCULAR ACCIDENT) (HCC): Status: ACTIVE | Noted: 2020-01-01

## 2020-06-23 PROBLEM — R53.1 WEAKNESS: Status: ACTIVE | Noted: 2020-01-01

## 2020-06-23 PROBLEM — N39.0 UTI (URINARY TRACT INFECTION): Status: ACTIVE | Noted: 2020-01-01

## 2020-06-23 PROBLEM — R06.00 DYSPNEA: Status: ACTIVE | Noted: 2020-01-01

## 2020-06-23 LAB
ALBUMIN LEVEL: 4.1 G/DL (ref 3.5–5)
ALBUMIN/GLOB SERPL: 1.1 {RATIO} (ref 1.1–1.8)
ALP ISO SERPL-ACNC: 478 U/L (ref 38–126)
ALT SERPLBLD-CCNC: 376 U/L (ref 12–78)
ANION GAP SERPL CALC-SCNC: 13.8 MEQ/L (ref 5–15)
AST SERPL QL: 244 U/L (ref 14–36)
BACTERIA URNS QL MICRO: (no result) /LPF
BILIRUB UR STRIP-MCNC: (no result) MG/DL
BILIRUBIN,TOTAL: 1.3 MG/DL (ref 0.2–1.3)
BUN SERPL-MCNC: 10 MG/DL (ref 7–17)
CALCIUM SPEC-MCNC: 9.7 MG/DL (ref 8.4–10.2)
CHLORIDE SPEC-SCNC: 98 MMOL/L (ref 98–107)
CO2 BLDCOA-SCNC: 24.8 MMHG (ref 23–27)
CO2 SERPL-SCNC: 30 MMOL/L (ref 22–30)
COLOR UR: (no result)
CREAT BLD-SCNC: 0.6 MG/DL (ref 0.52–1.04)
CREATININE CLEARANCE ESTIMATED: 71 ML/MIN (ref 50–200)
ESTIMATED GLOMERULAR FILT RATE: 100 ML/MIN (ref 60–?)
GFR (AFRICAN AMERICAN): 121 ML/MIN (ref 60–?)
GLOBULIN SER CALC-MCNC: 3.9 G/DL (ref 1.3–3.2)
GLUCOSE BLDC GLUCOMTR-MCNC: 118 MG/DL (ref 70–110)
GLUCOSE: 127 MG/DL (ref 74–100)
HCO3 BLDA-SCNC: 23.7 MMHG (ref 22–26)
HCT VFR BLD CALC: 49.4 % (ref 37–47)
HGB BLD-MCNC: 17 G/DL (ref 12.2–16.2)
INR PPP: 1.79 (ref 0.9–1.1)
KETONES UR STRIP.AUTO-MCNC: (no result) MG/DL
LEUKOCYTE ESTERASE UR QL STRIP: (no result)
MCHC RBC-ENTMCNC: 34.5 G/DL (ref 31.8–35.4)
MCV RBC: 99.2 FL (ref 81–99)
MEAN CORPUSCULAR HEMOGLOBIN: 34.2 PG (ref 27–31.2)
MICRO URNS: (no result)
PCO2 BLDA: 36.4 MMHG (ref 35–45)
PH BLDA: 7.43 MMOL/L (ref 7.35–7.45)
PH UR: 6.5 [PH] (ref 5–8.5)
PLATELET # BLD: 188 K/MM3 (ref 142–424)
PO2 BLDA: 90.8 MMHG (ref 80–100)
POTASSIUM: 3.8 MMOL/L (ref 3.5–5.1)
PROT SERPL-MCNC: 8 G/DL (ref 6.3–8.2)
PROT UR STRIP-MCNC: (no result) MG/DL
PT BLD: 17.7 SECONDS (ref 9.4–11.8)
RBC # BLD AUTO: 4.98 M/MM3 (ref 4.2–5.4)
RBC #/AREA URNS HPF: (no result) #/HPF (ref 0–3)
SODIUM SPEC-SCNC: 138 MMOL/L (ref 136–145)
SOURCE: (no result)
SP GR UR: 1.02 (ref 1–1.03)
TROPONIN I: < 0.01 NG/ML (ref 0–0.03)
UROBILINOGEN UR QL: 4 EU/DL
WBC # BLD AUTO: 10.5 K/MM3 (ref 4.8–10.8)
WBC # UR: (no result) #/HPF (ref 0–3)

## 2020-06-23 PROCEDURE — 87086 URINE CULTURE/COLONY COUNT: CPT

## 2020-06-23 PROCEDURE — 96375 TX/PRO/DX INJ NEW DRUG ADDON: CPT

## 2020-06-23 PROCEDURE — 82962 GLUCOSE BLOOD TEST: CPT

## 2020-06-23 PROCEDURE — 85610 PROTHROMBIN TIME: CPT

## 2020-06-23 PROCEDURE — 87186 SC STD MICRODIL/AGAR DIL: CPT

## 2020-06-23 PROCEDURE — 71045 X-RAY EXAM CHEST 1 VIEW: CPT

## 2020-06-23 PROCEDURE — 82803 BLOOD GASES ANY COMBINATION: CPT

## 2020-06-23 PROCEDURE — 87088 URINE BACTERIA CULTURE: CPT

## 2020-06-23 PROCEDURE — 93005 ELECTROCARDIOGRAM TRACING: CPT

## 2020-06-23 PROCEDURE — 81001 URINALYSIS AUTO W/SCOPE: CPT

## 2020-06-23 PROCEDURE — 96365 THER/PROPH/DIAG IV INF INIT: CPT

## 2020-06-23 PROCEDURE — 70450 CT HEAD/BRAIN W/O DYE: CPT

## 2020-06-23 PROCEDURE — 80053 COMPREHEN METABOLIC PANEL: CPT

## 2020-06-23 PROCEDURE — 99285 EMERGENCY DEPT VISIT HI MDM: CPT

## 2020-06-23 PROCEDURE — 84484 ASSAY OF TROPONIN QUANT: CPT

## 2020-06-23 PROCEDURE — 85025 COMPLETE CBC W/AUTO DIFF WBC: CPT

## 2020-06-23 NOTE — OUTREACH NOTE
Stroke Week 4 Survey      Responses   [facility] patient discharged from?  [place]   Does the patient have one of the following disease processes/diagnoses(primary or secondary)?  Stroke (TIA)   Week 4 attempt successful?  No   Revoke  Readmitted [Patient noted readmitted per EHR. ]          Amanda Maravilla RN

## 2020-06-26 PROBLEM — J44.1 COPD WITH ACUTE EXACERBATION (HCC): Status: ACTIVE | Noted: 2020-01-01

## 2020-06-26 PROBLEM — R91.1 NODULE OF UPPER LOBE OF LEFT LUNG: Status: ACTIVE | Noted: 2020-01-01

## 2020-06-26 PROBLEM — I69.322 DYSARTHRIA DUE TO RECENT CEREBROVASCULAR ACCIDENT (CVA): Status: ACTIVE | Noted: 2020-01-01

## 2020-06-26 PROBLEM — R79.89 ELEVATED LFTS: Status: ACTIVE | Noted: 2020-01-01

## 2020-06-26 PROBLEM — G47.00 INSOMNIA: Status: ACTIVE | Noted: 2020-01-01

## 2020-06-26 PROBLEM — J96.01 ACUTE RESPIRATORY FAILURE WITH HYPOXIA (HCC): Status: ACTIVE | Noted: 2020-01-01

## 2020-06-28 NOTE — OUTREACH NOTE
Prep Survey      Responses   [facility] facility patient discharged from?  [place]   Is LACE score < 7 ?  No   Eligibility  Readm Mgmt   Discharge diagnosis   afib w/ RVR   COVID-19 Test Status  Negative   Does the patient have one of the following disease processes/diagnoses(primary or secondary)?  Other   Does the patient have Home health ordered?  Yes   What is the Home health agency?   [facility]   Is there a DME ordered?  Yes   What DME was ordered?  able care-w/c and nebulizer   Prep survey completed?  Yes          Renee De Luna RN

## 2020-06-30 NOTE — OUTREACH NOTE
Medical Week 1 Survey      Responses   [facility] patient discharged from?  [place]   COVID-19 Test Status  Negative   Does the patient have one of the following disease processes/diagnoses(primary or secondary)?  Other   Is there a successful TCM telephone encounter documented?  No   Week 1 attempt successful?  No   Unsuccessful attempts  Attempt 1          Olga Oakes RN

## 2020-07-01 NOTE — TELEPHONE ENCOUNTER
Patient's speech therapist called and let a message stating that the patient was started on Diltiazem  mg daily. Since she has been taking this medication her lower extremities have been swelling. I tried to call the patient back to get more information. No answer. I left a message.

## 2020-07-02 NOTE — OUTREACH NOTE
Medical Week 1 Survey      Responses   [facility] patient discharged from?  [place]   COVID-19 Test Status  Negative   Does the patient have one of the following disease processes/diagnoses(primary or secondary)?  Other   Is there a successful TCM telephone encounter documented?  No   Week 1 attempt successful?  No   Unsuccessful attempts  Attempt 2          Gayle Logan RN

## 2020-07-06 NOTE — OUTREACH NOTE
Medical Week 1 Survey      Responses   [facility] patient discharged from?  [place]   COVID-19 Test Status  Negative   Does the patient have one of the following disease processes/diagnoses(primary or secondary)?  Other   Is there a successful TCM telephone encounter documented?  No   Week 1 attempt successful?  Yes   Call start time  0942   Call end time  0948   Discharge diagnosis   afib w/ RVR   Is patient permission given to speak with other caregiver?  Yes   List who call center can speak with  Elvin, son   Person spoke with today (if not patient) and relationship  Spoke to patient and son, Elvin   Meds reviewed with patient/caregiver?  Yes   Is the patient having any side effects they believe may be caused by any medication additions or changes?  No   Does the patient have all medications ordered at discharge?  Yes   Is the patient taking all medications as directed (includes completed medication regime)?  Yes   Medication comments  Family to discuss medication list with cardiologist today.    Does the patient have a primary care provider?   Yes   Does the patient have an appointment with their PCP within 7 days of discharge?  Yes   Comments regarding PCP  Otoniel Babin MD PCP   Has the patient kept scheduled appointments due by today?  N/A   Comments  Follow Up with Earnest Lugo DO, Monday Jul 6, 2020 1:15 PM   What is the Home health agency?   [facility]    Has home health visited the patient within 72 hours of discharge?  Yes   What DME was ordered?  able care-w/c and nebulizer   Has all DME been delivered?  Yes   Pulse Ox monitoring  None   Psychosocial issues?  No   Did the patient receive a copy of their discharge instructions?  Yes   Nursing interventions  Reviewed instructions with patient   What is the patient's perception of their health status since discharge?  Improving   Is the patient/caregiver able to teach back signs and symptoms related to disease process for when to call  PCP?  Yes   Is the patient/caregiver able to teach back signs and symptoms related to disease process for when to call 911?  Yes   Is the patient/caregiver able to teach back the hierarchy of who to call/visit for symptoms/problems? PCP, Specialist, Home health nurse, Urgent Care, ED, 911  Yes   Week 1 call completed?  Yes          Amanda Maravilla RN

## 2020-07-06 NOTE — PROGRESS NOTES
Cardiac Electrophysiology Outpatient Follow Up Note            [facility]     Follow Up Office Visit      Lynda Thorne  [id number]  07/06/2020  [unfilled]  [unfilled]    Primary Care Physician: Otoniel Babin MD    Referred By: No ref. provider found    Subjective     Chief Complaint:   Chief Complaint   Patient presents with   • Acute CVA (cerebrovascular accident) (CMS/[facility])       History of Present Illness:   Mrs. Lynda Thorne is a 64 y.o. female who presents to my electrophysiology clinic for follow up of ischemic stroke which is cardioembolic resulting from atrial fibrillation.  She has no awareness of episodes of atrial fibrillation she has no palpitations however.  She is getting around now in a wheelchair and is having some difficulty with memory and speech as a result of a stroke earlier this spring.  This is been a real setback for her.  She is here today with her .    She assures me that she prior to her stroke was completely compliant with her apixaban.  She also assures me that she is now subsequently been completely compliant with her Xarelto.    She tells me about an episode where she had a clot in her arm which required some kind of intervention.  She is never seen a hematologist.    She presently has no chills nausea vomit fevers or chills.    She was told that she had heart failure because of a little bit of edema in her legs.  He never wakes up at night short of breath she does not have any difficulty breathing while lying flat.      Review of Systems:   Constitutional: No fevers or chills, no recent weight gain or weight loss or fatigue  Eyes: No visual loss, blurred vision, double vision, yellow sclerae.  ENT: No headaches, hearing loss, vertigo, congestion or sore throat.   Cardiovascular: Per HPI  Respiratory: No cough or wheezing, no sputum production, no hematemesis   Gastrointestinal: No abdominal pain, no nausea, vomiting,  constipation, diarrhea, melena.   Genitourinary: No dysuria, hematuria or increased frequency.  Musculoskeletal:  No gait disturbance, weakness or joint pain or stiffness  Integumentary: No rashes, urticaria, ulcers or sores.   Neurological: No headache, dizziness, syncope, paralysis, ataxia, no prior CVA/TIA  Psychiatric: No anxiety, or depression  Endocrine: No diaphoresis, cold or heat intolerance. No polyuria or polydipsia.   Hematologic/Lymphatic: No anemia, abnormal bruising or bleeding. No history of DVT/PE.    Past Medical History:   Past Medical History:   Diagnosis Date   • Asthma    • Depression with anxiety    • Hypertension        Past Surgical History:   Past Surgical History:   Procedure Laterality Date   • INTERVENTIONAL RADIOLOGY PROCEDURE Bilateral 5/23/2020    Procedure: CAROTID CEREBRAL ANGIOGRAM BILATERAL;  Surgeon: Jayden Beck MD;  Location: [facility] INVASIVE LOC[facility];  Service: Interventional Radiology;  Laterality: Bilateral;       Family History:   Family History   Problem Relation Age of Onset   • No Known Problems Mother    • No Known Problems Father        Social History:   Social History     Socioeconomic History   • Marital status:      Spouse name: Not on file   • Number of children: Not on file   • Years of education: Not on file   • Highest education level: Not on file   Tobacco Use   • Smoking status: Current Every Day Smoker   • Smokeless tobacco: Never Used   Substance and Sexual Activity   • Alcohol use: Defer   • Drug use: Defer   • Sexual activity: Defer       Medications:     Current Outpatient Medications:   •  albuterol sulfate  (90 Base) MCG/ACT inhaler, Inhale 2 puffs Every 4 (Four) Hours As Needed for Wheezing., Disp: , Rfl:   •  aspirin 81 MG EC tablet, Take 81 mg by mouth Daily., Disp: , Rfl:   •  atorvastatin (LIPITOR) 40 MG tablet, Take 1 tablet by mouth Every Night., Disp: 30 tablet, Rfl: 0  •  budesonide (PULMICORT) 0.5 MG/2ML nebulizer  "solution, Take 2 mL by nebulization 2 (two) times a day., Disp: 60 mL, Rfl: 1  •  busPIRone (BUSPAR) 10 MG tablet, Take 10 mg by mouth 2 (Two) Times a Day., Disp: , Rfl:   •  dilTIAZem CD (CARDIZEM CD) 360 MG 24 hr capsule, Take 1 capsule by mouth Daily., Disp: 30 capsule, Rfl: 0  •  ipratropium-albuterol (DUO-NEB) 0.5-2.5 mg/3 ml nebulizer, Take 3 mL by nebulization 4 (Four) Times a Day., Disp: 360 mL, Rfl: 1  •  lisinopril (PRINIVIL,ZESTRIL) 5 MG tablet, Take 1 tablet by mouth Daily., Disp: 30 tablet, Rfl: 0  •  metoprolol succinate XL (TOPROL-XL) 100 MG 24 hr tablet, Take 1 tablet by mouth Daily., Disp: 30 tablet, Rfl: 0  •  rivaroxaban (XARELTO) 20 MG tablet, Take 1 tablet by mouth Daily With Dinner. Indications: Atrial Fibrillation, Disp: 30 tablet, Rfl: 0  •  sennosides-docusate (PERICOLACE) 8.6-50 MG per tablet, Take 2 tablets by mouth 2 (Two) Times a Day., Disp: , Rfl:   •  traZODone (DESYREL) 50 MG tablet, Take 50 mg by mouth At Night As Needed for Sleep., Disp: , Rfl:   •  venlafaxine XR (EFFEXOR-XR) 150 MG 24 hr capsule, Take 150 mg by mouth Daily., Disp: , Rfl:     Allergies:   Allergies   Allergen Reactions   • Msg [Monosodium Glutamate] Shortness Of Breath and Arrhythmia     Reported by pt's son       Objective     Physical Exam:  Vital Signs:   Vitals:    07/06/20 1342   BP: 124/80   BP Location: Right arm   Patient Position: Sitting   Pulse: 73   Weight: 73.5 kg (162 lb)   Height: 162.6 cm (64\")     GEN: Well nourished, well-developed, no acute distress  HEENT: Normocephalic, atraumatic, moist mucous membranes  NECK: Supple, no JVD, no thyromegaly, no lymphadenopathy  CARD: Regular rate and rhythm, normal S1 & S2 are present.  No murmur, gallop or rubs are appreciated.  LUNGS: Clear to auscultation bilateraly, normal respiratory effort  ABDOMEN: Soft, nontender, normal bowel sounds  EXTREMITIES: No gross deformities, no clubbing, cyanosis.  Edema none at all  SKIN: Warm, dry  NEURO: Some difficulty " with speech inappropriate word use.  PSYCHIATRIC: Normal affect and mood, appropriate use of semantics and logic.        Lab Results   Component Value Date    GLUCOSE 113 (H) 06/27/2020    CALCIUM 9.5 06/27/2020     06/27/2020    K 4.1 06/27/2020    CO2 27.0 06/27/2020     06/27/2020    BUN 15 06/27/2020    CREATININE 0.68 06/27/2020    EGFRIFNONA 87 06/27/2020    BCR 22.1 06/27/2020    ANIONGAP 8.0 06/27/2020     Lab Results   Component Value Date    WBC 17.12 (H) 06/27/2020    HGB 14.7 06/27/2020    HCT 44.7 06/27/2020    MCV 98.7 (H) 06/27/2020     06/27/2020     Lab Results   Component Value Date    INR 1.3 (H) 05/23/2020    INR 1.07 05/14/2019    INR 1.1 05/14/2019    PROTIME 15.2 05/23/2020    PROTIME 13.4 05/14/2019    PROTIME 13.5 05/14/2019     No results found for: TSH, M1TFHYU, J1BISSP, THYROIDAB    Cardiac Testing:     I personally viewed and interpreted the patient's EKG/Telemetry/lab data    Procedures    Tobacco Cessation: N/A  Obstructive Sleep Apnea Screening: N/A    Assessment & Plan      64-year-old female patient here for follow-up after presenting with an ischemic stroke involving the left MCA distribution earlier this spring.  At that time she was anticoagulated with apixaban.  This was felt to be failure of apixaban therapy in the context of primary prevention of stroke.  She tells me however that she had an earlier episode about a year ago where she had some kind of clot in her artery in her arm and underwent a procedure for this.  She is never seen a hematologist.    She is tolerating alto well at this point.    I do not think that she has heart failure.  She is a normal ejection fraction and has no aspect of her physical exam consistent with heart failure.  She was relieved to hear this.    We will have her follow-up with her cardiologist.    I will refer her to a hematologist.    I will see her back after visiting with her hematologist in 4 months time.    There are no  diagnoses linked to this encounter.    Thank you for allowing me to participate in the care of your patient. Please to not hesitate to contact me with additional questions or concerns.        Earnest Lugo DO, FACC, [facility]  Cardiac Electrophysiologist

## 2020-07-13 PROBLEM — Z79.01 CHRONIC ANTICOAGULATION: Status: ACTIVE | Noted: 2020-01-01

## 2020-07-13 PROBLEM — J43.2 CENTRILOBULAR EMPHYSEMA (HCC): Status: ACTIVE | Noted: 2020-01-01

## 2020-07-13 NOTE — PROGRESS NOTES
New Patient Pulmonary Office Visit      Patient Name: Lynda Thorne    Referring Physician: Jordy Griffin MD    Chief Complaint:    Chief Complaint   Patient presents with   • COPD       History of Present Illness: Lynda Thorne is a 64 y.o. female who is here today to establish care with Pulmonary.  Patient has a past medical history significant for hypertension, COPD, atrial fibrillation, and a recent CVA on 5/23/2020 status post mechanical thrombectomy and started on Xarelto.  Patient was recently hospitalized in June 2020 at which point in time she was found to have a 1.6 cm lung nodule in the left upper lobe.  She was also found to have A. fib.  She was started on Xarelto at that time.  She was sent to pulmonary for evaluation of the lung nodule and COPD.  Regards to COPD she has been diagnosed for many years and generally use budesonide, and albuterol nebs.  She states that she does not have frequent exacerbations.  Has only gotten bronchitis 1 or 2 times in her life.  Does not have frequent pneumonias.  She was a long-term smoker with over 50 pack/year smoking history but quit back in May after her first stroke.  She does not have any current coughing or mucus production.  She has no other complaints.  With regards to the lung nodule it was 1.6 cm in size in the left upper lobe seen on the CTA of the neck incidentally.  She denies any weight loss, fever, chills, or night sweats.  As noted above she does have a strong smoking history.  She has no other complaints.    Review of Systems:   Review of Systems   Constitutional: Negative for activity change, appetite change, chills and diaphoresis.   HENT: Negative for congestion, postnasal drip, sinus pressure and voice change.    Eyes: Negative for blurred vision.   Respiratory: Positive for cough and shortness of breath. Negative for wheezing.    Cardiovascular: Negative for chest pain.   Gastrointestinal: Negative for abdominal pain.   Musculoskeletal:  Negative for myalgias.   Skin: Negative for color change and dry skin.   Allergic/Immunologic: Negative for environmental allergies.   Neurological: Positive for dizziness and weakness. Negative for confusion.   Hematological: Negative for adenopathy.   Psychiatric/Behavioral: Negative for sleep disturbance and depressed mood.       Past Medical History:   Past Medical History:   Diagnosis Date   • Asthma    • Depression with anxiety    • Hypertension        Past Surgical History:   Past Surgical History:   Procedure Laterality Date   • INTERVENTIONAL RADIOLOGY PROCEDURE Bilateral 5/23/2020    Procedure: CAROTID CEREBRAL ANGIOGRAM BILATERAL;  Surgeon: Jayden Beck MD;  Location: [facility] INVASIVE LOCATION;  Service: Interventional Radiology;  Laterality: Bilateral;       Family History:   Family History   Problem Relation Age of Onset   • No Known Problems Mother    • No Known Problems Father        Social History:   Social History     Socioeconomic History   • Marital status:      Spouse name: Not on file   • Number of children: Not on file   • Years of education: Not on file   • Highest education level: Not on file   Tobacco Use   • Smoking status: Current Every Day Smoker   • Smokeless tobacco: Never Used   Substance and Sexual Activity   • Alcohol use: Defer   • Drug use: Defer   • Sexual activity: Defer       Medications:     Current Outpatient Medications:   •  albuterol sulfate  (90 Base) MCG/ACT inhaler, Inhale 2 puffs Every 4 (Four) Hours As Needed for Wheezing., Disp: , Rfl:   •  aspirin 81 MG EC tablet, Take 81 mg by mouth Daily., Disp: , Rfl:   •  atorvastatin (LIPITOR) 40 MG tablet, Take 1 tablet by mouth Every Night., Disp: 30 tablet, Rfl: 0  •  busPIRone (BUSPAR) 10 MG tablet, Take 10 mg by mouth 2 (Two) Times a Day., Disp: , Rfl:   •  dilTIAZem CD (CARDIZEM CD) 360 MG 24 hr capsule, Take 1 capsule by mouth Daily., Disp: 30 capsule, Rfl: 0  •  ipratropium-albuterol (DUO-NEB)  "0.5-2.5 mg/3 ml nebulizer, Take 3 mL by nebulization 4 (Four) Times a Day., Disp: 360 mL, Rfl: 1  •  lisinopril (PRINIVIL,ZESTRIL) 5 MG tablet, Take 1 tablet by mouth Daily., Disp: 30 tablet, Rfl: 0  •  metoprolol succinate XL (TOPROL-XL) 100 MG 24 hr tablet, Take 1 tablet by mouth Daily., Disp: 30 tablet, Rfl: 0  •  rivaroxaban (XARELTO) 20 MG tablet, Take 1 tablet by mouth Daily With Dinner. Indications: Atrial Fibrillation, Disp: 30 tablet, Rfl: 0  •  sennosides-docusate (PERICOLACE) 8.6-50 MG per tablet, Take 2 tablets by mouth 2 (Two) Times a Day., Disp: , Rfl:   •  traZODone (DESYREL) 50 MG tablet, Take 50 mg by mouth At Night As Needed for Sleep., Disp: , Rfl:   •  venlafaxine XR (EFFEXOR-XR) 150 MG 24 hr capsule, Take 150 mg by mouth Daily., Disp: , Rfl:   •  umeclidinium-vilanterol (Anoro Ellipta) 62.5-25 MCG/INH aerosol powder  inhaler, Inhale 1 puff Daily., Disp: 60 each, Rfl: 5  No current facility-administered medications for this visit.     Allergies:   Allergies   Allergen Reactions   • Msg [Monosodium Glutamate] Shortness Of Breath and Arrhythmia     Reported by pt's son       Physical Exam:  Vital Signs:   Vitals:    07/13/20 1218   BP: 132/80   Pulse: 86   Temp: 98 °F (36.7 °C)   SpO2: 94%  Comment: resting, room air   Weight: 75.2 kg (165 lb 12.8 oz)   Height: 163.8 cm (64.5\")       Physical Exam   Constitutional: She is oriented to person, place, and time. She appears well-developed.   HENT:   Head: Normocephalic and atraumatic.   Nose: Nose normal.   Mouth/Throat: Oropharynx is clear and moist.   Eyes: Pupils are equal, round, and reactive to light. Conjunctivae are normal.   Neck: Normal range of motion. Neck supple.   Cardiovascular: Normal rate and regular rhythm. Exam reveals no gallop and no friction rub.   No murmur heard.  Pulmonary/Chest: Effort normal and breath sounds normal. She has no wheezes. She has no rales.   Abdominal: Soft. Bowel sounds are normal.   Musculoskeletal: Normal " range of motion. She exhibits no edema.   Neurological: She is alert and oriented to person, place, and time.   Skin: Skin is warm and dry. No erythema.   Psychiatric: She has a normal mood and affect. Her behavior is normal.   Nursing note and vitals reviewed.      Results Review:   - I personally reviewed the pts imaging from the lung windows from the CT scan of the neck from May 23 and June 23, 2020 which showed a 1.6 cm nodule in the left upper lobe with emphysematous changes.  - I personally reviewed the pts PFT from personally viewed the patient's PFTs from 7/13/2001 which showed severe obstruction with an FEV1 of 38%, and a severely reduced DLCO.  And significant air trapping.  - I personally reviewed the pts Echo from 5/23/2020 which showed an RVSP of 30 mmHg, EF of 66%, with left ventricular systolic function noted to be normal, and normal diastolic function.  -I personally viewed the patient's chart with regards to her recent hospitalizations in May and June 2020.    Assessment / Plan:   Centrilobular emphysema (CMS/HCC)  - Patient with gold stage IV class C COPD.  Given her lack of frequent exacerbations I do not feel that she needs the inhaled corticosteroid.  Therefore I will go ahead and switch her over to Anoro 1 puff once daily.  I have asked her to keep the duo nebs and albuterol to use on an as-needed basis.  -New prescription for Anoro sent to the pharmacy.  -Patient should continue work with physical therapy from her stroke standpoint, eventually she may be able to benefit from pulmonary rehab as well.  -Flu shot q. fall    Nodule of upper lobe of left lung  -CTA of the neck reviewed above she does have a 1.6 m nodule in the left upper lobe that was stable between May 23 and June 23, 2020, given this I will go ahead and wait until August to repeat the CT and get a full CT of the chest to make sure there are no other masses or nodules present.  This is complicated as far as how we will proceed if  the nodule continues to grow as she is on chronic anticoagulation secondary to a CVA and I would have to put her under general anesthesia to do a navigational bronchoscopy.  With this the bleeding risk is quite high.  But the patient would be a candidate for CyberKnife if this is in fact malignant.  Therefore I will go ahead and order a CT scan of the chest and I will review this with her follow-up in August.    Recent CVA, with mechanical thrombectomy on May 23, 2020, on Xarelto.  Paroxysmal atrial fibrillation (CMS/HCC)  Chronic anticoagulation  -Continue physical therapy, continue anticoagulation for atrial fibrillation.  This will have to be stopped or bridge pending any procedure for the left upper lobe lung nodule.      Follow Up:   Return in about 6 weeks (around 8/24/2020) for CT Chest with next visit.     JAYCE Willis, DO  Pulmonary and Critical Care Medicine  Note Electronically Signed    Please note that portions of this note may have been completed with a voice recognition program. Efforts were made to edit the dictations, but occasionally words are mistranscribed.

## 2020-07-14 NOTE — OUTREACH NOTE
Medical Week 2 Survey      Responses   [facility] patient discharged from?  [place]   COVID-19 Test Status  Negative   Does the patient have one of the following disease processes/diagnoses(primary or secondary)?  Other   Week 2 attempt successful?  No   Unsuccessful attempts  Attempt 1          Meredith Damico RN

## 2020-07-14 NOTE — TELEPHONE ENCOUNTER
Per Dr. Lugo, I referred the patient to hematology. They are requesting records in reference to the previous blood clot that she had in her arm. I have tried to contact her multiple times and left messages with no response. If she does call back with this information, the records need to be faxed to [phone].

## 2020-07-29 NOTE — TELEPHONE ENCOUNTER
Records from  are in Medical Records to be scanned in. As soon as hematology can see the records in Media they will schedule the consult appointment.

## 2020-08-18 NOTE — OUTREACH NOTE
Stroke Fifi Survey      Responses   Facility patient discharged from?  [place]   Attempt successful  No   Unsuccessful attempts  Attempt 1          Yoana Kelly RN

## 2020-08-20 NOTE — OUTREACH NOTE
Stroke Fifi Survey      Responses   Facility patient discharged from?  [place]   Attempt successful  No   Unsuccessful attempts  Attempt 2          Yoana Kelly RN

## 2020-08-24 NOTE — PROGRESS NOTES
Follow Up Office Note       Patient Name: Lynda Thorne    Referring Physician: Jordy Griffin MD    Chief Complaint:    Chief Complaint   Patient presents with   • Emphysema       History of Present Illness: Lynda Thorne is a 64 y.o. female who is here today to follow-up care with Pulmonary.   Patient has a past medical history significant for hypertension, COPD, atrial fibrillation, and a recent CVA on 5/23/2020 status post mechanical thrombectomy and started on Xarelto.     COPD: Patient with gold stage IV COPD class D.  He was currently on Anoro, she notes that it is quite expensive was 1 if there is anything she could switch to.  She denies any recent exacerbations, fever, chills, or prednisone use.  She does note that her breathing has improved though since she started the Anoro and feels less short of breath.    A. fib: No episodes of RVR since her last visit.  She continues on Eliquis and diltiazem.  She has no other complaints.    Lung nodule: Initially found to have a one-point centimeter nodule in June 2020 with repeat performed in August 2020 showing stability with no change over the course of 3 months.  She denies any fevers, chills, weight loss, cough or hemoptysis.    Review of Systems:   Review of Systems   Constitutional: Negative for chills, fatigue and fever.   HENT: Negative for congestion and voice change.    Eyes: Negative for blurred vision.   Respiratory: Negative for cough, shortness of breath and wheezing.    Cardiovascular: Negative for chest pain.   Skin: Negative for dry skin.   Hematological: Negative for adenopathy.   Psychiatric/Behavioral: Negative for agitation and depressed mood.       The following portions of the patient's history were reviewed and updated as appropriate: allergies, current medications, past family history, past medical history, past social history, past surgical history and problem list.    Physical Exam:  Vital Signs:   Vitals:    08/24/20 1441   BP:  "132/80   Pulse: 72   Temp: 98.2 °F (36.8 °C)   SpO2: 90%  Comment: resting at room air   Weight: 75.8 kg (167 lb 3.2 oz)   Height: 163.8 cm (64.5\")       Physical Exam   Constitutional: She is oriented to person, place, and time. She appears well-developed and well-nourished.   HENT:   Head: Normocephalic and atraumatic.   Right Ear: External ear normal.   Left Ear: External ear normal.   Mouth/Throat: Oropharynx is clear and moist.   Eyes: Pupils are equal, round, and reactive to light. Conjunctivae are normal.   Neck: Normal range of motion. Neck supple.   Cardiovascular: Normal rate and regular rhythm.   Pulmonary/Chest: Effort normal and breath sounds normal.   Abdominal: Soft. Bowel sounds are normal.   Musculoskeletal: Normal range of motion.   Neurological: She is alert and oriented to person, place, and time.   Psychiatric: She has a normal mood and affect. Her behavior is normal.   Nursing note and vitals reviewed.      Results Review:   - I personally reviewed the pts imaging from the CT of the chest from August 2020 showing a nodular focus in the left upper lobe measuring roughly 1.6 cm in size and is stable compared to June 2020.     -Lung windows from the CT scan of the neck from May 23 and June 23, 2020 which showed a 1.6 cm nodule in the left upper lobe with emphysematous changes.  - PFT from personally viewed the patient's PFTs from 7/13/2001 which showed severe obstruction with an FEV1 of 38%, and a severely reduced DLCO.  And significant air trapping.  - Echo from 5/23/2020 which showed an RVSP of 30 mmHg, EF of 66%, with left ventricular systolic function noted to be normal, and normal diastolic function.    Assessment / Plan:   Centrilobular emphysema (CMS/HCC)  -Gold stage IV class D COPD.  She needs to continue on a LABA/L AMA inhaler.  Currently she is on Anoro but it cost $200 per month.  Therefore I have sent her a new prescription for Stiolto and have asked her to talk to the pharmacy to " see if this is a cheaper option.  If it is then we will have her continue on the Stiolto.  Otherwise she is to call the office for us to adjust her medications to find the best option her insurance will cover.  -Flu shot in the fall    Nodule of upper lobe of left lung  -Nodules currently stable, we will have the patient repeat imaging in 6 months.  -CT of the chest ordered    Paroxysmal atrial fibrillation (CMS/HCC)  -No signs of RVR, well controlled on current regimen.  Patient is on anticoagulation which she would need to bridge if we ended up needing to do a biopsy of the lesion in the future.  For now she should continue.    Follow Up:   Return in about 3 months (around 11/24/2020) for CT of the chest in 6 months.       JAYCE Willis, DO  Pulmonary and Critical Care Medicine  Note Electronically Signed    Please note that portions of this note may have been completed with a voice recognition program. Efforts were made to edit the dictations, but occasionally words are mistranscribed.

## 2020-08-25 NOTE — OUTREACH NOTE
Stroke Fifi Survey      Responses   Facility patient discharged from?  [place]   Attempt successful  No   Unsuccessful attempts  Attempt 3   Call Center [place] Score  7          Yoana Kelly RN

## 2020-08-27 NOTE — PROGRESS NOTES
"Patient: Lynda Thorne  : 1955    Primary Care Provider: Otoniel Babin MD      Chief Complaint: CVA/thrombectomy follow-up    History of Present Illness:       Patient is a very nice 64-year-old female who is well-known to the neurosurgical practice for undergoing a left MCA thrombectomy.  Patient is not a TPA candidate because her last known well was greater than 45 hours and she was on Eliquis.    Postoperatively patient was quite a phasic and had right-sided motor tone but pretty exquisite weakness in the right lower extremity.    Today patient is post thrombectomy about 3 months and she has made a [place] recovery wears a AFO on the right foot but is able to ambulate without a use of a walker.  Patient is hoping to get back to fishing\".  Patient's speech is normalized and back to her baseline.  Patient has a very low stroke score and presented with NIH of 21-23.    Patient is accompanied with her sister-in-law and questions.  Postoperatively were answered    Review of Systems   Constitutional: Negative for activity change, appetite change, chills, diaphoresis, fatigue, fever and unexpected weight change.   HENT: Negative for congestion, dental problem, drooling, ear discharge, ear pain, facial swelling, hearing loss, mouth sores, nosebleeds, postnasal drip, rhinorrhea, sinus pressure, sinus pain, sneezing, sore throat, tinnitus, trouble swallowing and voice change.    Eyes: Negative for photophobia, pain, discharge, redness, itching and visual disturbance.   Respiratory: Negative for apnea, cough, choking, chest tightness, shortness of breath, wheezing and stridor.    Cardiovascular: Negative for chest pain, palpitations and leg swelling.   Gastrointestinal: Negative for abdominal distention, abdominal pain, anal bleeding, blood in stool, constipation, diarrhea, nausea, rectal pain and vomiting.   Endocrine: Negative for cold intolerance, heat intolerance, polydipsia, polyphagia and " polyuria.   Genitourinary: Negative for decreased urine volume, difficulty urinating, dyspareunia, dysuria, enuresis, flank pain, frequency, genital sores, hematuria, menstrual problem, pelvic pain, urgency, vaginal bleeding, vaginal discharge and vaginal pain.   Musculoskeletal: Negative for arthralgias, back pain, gait problem, joint swelling, myalgias, neck pain and neck stiffness.   Skin: Negative for color change, pallor, rash and wound.   Allergic/Immunologic: Negative for environmental allergies, food allergies and immunocompromised state.   Neurological: Negative for dizziness, tremors, seizures, syncope, facial asymmetry, speech difficulty, weakness, light-headedness, numbness and headaches.   Hematological: Negative for adenopathy. Does not bruise/bleed easily.   Psychiatric/Behavioral: Negative for agitation, behavioral problems, confusion, decreased concentration, dysphoric mood, hallucinations, self-injury, sleep disturbance and suicidal ideas. The patient is not nervous/anxious and is not hyperactive.        Past Medical History:     Past Medical History:   Diagnosis Date   • Arthritis    • Asthma    • Depression with anxiety    • Hypertension    • Stroke (CMS/[facility])        Family History:     Family History   Problem Relation Age of Onset   • Diabetes Mother    • Hypertension Father        Social History:    reports that she quit smoking about 3 months ago. She has never used smokeless tobacco. She reports that she drinks alcohol. Drug use questions deferred to the physician.   SMOKING STATUS: Non-smoker    Surgical History:     Past Surgical History:   Procedure Laterality Date   • INTERVENTIONAL RADIOLOGY PROCEDURE Bilateral 5/23/2020    Procedure: CAROTID CEREBRAL ANGIOGRAM BILATERAL;  Surgeon: Jayden Beck MD;  Location: [facility] INVASIVE LOC[facility];  Service: Interventional Radiology;  Laterality: Bilateral;       Allergies:   Msg [monosodium glutamate]    Physical Exam:    Vital Signs:BP  "150/78 (BP Location: Left arm, Patient Position: Sitting, Cuff Size: Adult)   Temp 98 °F (36.7 °C)   Ht 167.6 cm (66\")   Wt 74.8 kg (165 lb)   LMP  (LMP Unknown)   BMI 26.63 kg/m²    BMI: Body mass index is 26.63 kg/m².     GENERAL:           The patient is in no acute distress, and is able to answer all questions appropriately.    Neck:          Supple without lymphadenopathy    Cardiovascular:       Peripheral pulses 2+ at dorsalis pedis and anterior tibialis    Lungs:         Breathing unlabored    Musculoskeletal:            strength is 5 out of 5 bilaterally.        Shoulder abduction is 5 out of 5.         Dorsiflexion is weak on right foot but 5 out of 5 on the left       Plantarflexion is 5/5 bilaterally       Hip Flexion 5/5 bilaterally.         The patient´s gait is normal without antalgia.    Neurologic:          The patient is alert and oriented by 3.          Pupils are equal and reactive to light.         Visual fields are full.         Extraocular movements are intact without nystagmus.         There is no evidence of central motor drift. No facial droop.  No difficulty with rapid alternating movements.         Sensation is equal bilaterally with no deficit.           Reflexes:  2+ through out    CRANIAL NERVES:         Cranial nerve II: Visual fields are full to confrontation.       Cranial nerves III, IV and VI: PERRLA DC.  Extraocular movements are intact.  Nystagmus is not present.       Cranial nerve V: Facial sensation is intact to light touch.       Cranial nerve VII: Muscles of facial expression revealed no asymmetry.       Cranial nerve VIII: Hearing is intact to finger rub bilaterally.       Cranial nerve IX and X: Palate elevates symmetrically.        Cranial nerve XI: Shoulder shrug is intact.       Cranial nerve XII: Tongue is midline without evidence of Atrophy or fasciculation.    Medical Decision Making    Data Review:   Data was reviewed from the hospital but no new films " reviewed at this visit    Diagnosis:   Acute left MCA stroke status post thrombectomy    Treatment Options:   Patient is doing exceedingly well and is almost fully recovered from her stroke.  Thrombectomy was done on a large core but only minimal insular ribbon infarct on the left basal ganglia and parietal lobe was noted.     Patient has had made a [place] recovery and is very happy with her outcome.  Patient is grateful.    We will see the patient back on an as-needed basis.  Patient will need to continue working with rehabilitation to see if she can get her right foot stronger but she is light years better than she was when she left the hospital.    Patient will continue on Xarelto.  Patient was taking Eliquis and still had the left MCA stroke.  Patient is continuing on her aspirin and high-dose statin as well.    Patient's Body mass index is 26.63 kg/m². BMI is within normal parameters. No follow-up required.     Diagnosis Plan   1. Paroxysmal atrial fibrillation (CMS/HCC)     2. Cerebrovascular accident (CVA) due to embolism of left middle cerebral artery (CMS/HCC)

## 2020-09-01 NOTE — PROGRESS NOTES
DATE OF CONSULTATION: 2020    REFERRING PHYSICIAN: Otoniel Babin MD    Dear Otoniel Guzmán MD  Thank you for asking for my medical advice on this patient. I saw her in the  [place] office on 2020    REASON FOR CONSULTATION: Massive embolic stroke    HISTORY OF PRESENT ILLNESS: The patient is a very pleasant 64 y.o.  female   who was in her usual state of health until May 2020.  Patient present with sudden onset weakness as well as difficulty speaking.  She was diagnosed with left MCA embolic stroke required thrombectomy.  Patient had the previous history of right upper extremity arterial thrombosis requiring thrombectomy as well 2 years ago and since then she has been on Eliquis twice a day.  The time she had her stroke she was on Eliquis.  She told me she has been compliant on it.  Patient was discharged home after a stroke on full dose aspirin plus Xarelto.  She was referred to me for further recommendations.    SUBJECTIVE: When I saw the patient today she is here with her sister-in-law.  She denies any bleeding.  She has been compliant with her treatment.  She had fever chills night sweats.  She does have 2 brothers and maternal grandmother  from massive strokes.  The patient used to be heavy smoker but she quit smoking after her stroke.    Review of Systems   Constitutional: Negative for activity change, appetite change, chills, fatigue, fever and unexpected weight change.   HENT: Negative for hearing loss, mouth sores, nosebleeds, sore throat and trouble swallowing.    Eyes: Negative for visual disturbance.   Respiratory: Negative for cough, chest tightness, shortness of breath and wheezing.    Cardiovascular: Negative for chest pain, palpitations and leg swelling.   Gastrointestinal: Negative for abdominal distention, abdominal pain, blood in stool, constipation, diarrhea, nausea, rectal pain and vomiting.   Endocrine: Negative for cold intolerance and heat intolerance.    Genitourinary: Negative for difficulty urinating, dysuria, frequency and urgency.   Musculoskeletal: Negative for arthralgias, back pain, gait problem, joint swelling and myalgias.   Skin: Negative for rash.   Neurological: Negative for dizziness, tremors, syncope, weakness, light-headedness, numbness and headaches.   Hematological: Negative for adenopathy. Does not bruise/bleed easily.   Psychiatric/Behavioral: Negative for confusion, sleep disturbance and suicidal ideas. The patient is not nervous/anxious.        Past Medical History:   Diagnosis Date   • Arthritis    • Asthma    • Depression with anxiety    • Hypertension    • Stroke (CMS/[facility])        Social History     Socioeconomic History   • Marital status:      Spouse name: Not on file   • Number of children: Not on file   • Years of education: Not on file   • Highest education level: Not on file   Tobacco Use   • Smoking status: Former Smoker     Last attempt to quit: 2020     Years since quittin.2   • Smokeless tobacco: Never Used   Substance and Sexual Activity   • Alcohol use: Yes     Frequency: Never     Comment: Rare   • Drug use: Defer   • Sexual activity: Defer       Family History   Problem Relation Age of Onset   • Diabetes Mother    • Hypertension Father        Past Surgical History:   Procedure Laterality Date   • INTERVENTIONAL RADIOLOGY PROCEDURE Bilateral 2020    Procedure: CAROTID CEREBRAL ANGIOGRAM BILATERAL;  Surgeon: Jayden Beck MD;  Location: [facility] INVASIVE LOCATION;  Service: Interventional Radiology;  Laterality: Bilateral;       Allergies   Allergen Reactions   • Msg [Monosodium Glutamate] Shortness Of Breath and Arrhythmia     Reported by pt's son          Current Outpatient Medications:   •  albuterol sulfate  (90 Base) MCG/ACT inhaler, Inhale 2 puffs Every 4 (Four) Hours As Needed for Wheezing., Disp: , Rfl:   •  aspirin 81 MG EC tablet, Take 81 mg by mouth Daily., Disp: , Rfl:   •   "atorvastatin (LIPITOR) 40 MG tablet, Take 1 tablet by mouth Every Night., Disp: 30 tablet, Rfl: 0  •  budesonide (PULMICORT) 0.5 MG/2ML nebulizer solution, USE 1 VIAL VIA NEBULIZER TWICE A DAY, Disp: , Rfl:   •  busPIRone (BUSPAR) 10 MG tablet, Take 10 mg by mouth 2 (Two) Times a Day., Disp: , Rfl:   •  dilTIAZem CD (CARDIZEM CD) 360 MG 24 hr capsule, Take 1 capsule by mouth Daily., Disp: 30 capsule, Rfl: 0  •  fluocinonide (LIDEX) 0.05 % external solution, APPLY TWICE WEEKLY AS DIRECTED, Disp: , Rfl:   •  furosemide (LASIX) 40 MG tablet, Take 40 mg by mouth Daily., Disp: , Rfl:   •  ipratropium-albuterol (DUO-NEB) 0.5-2.5 mg/3 ml nebulizer, Take 3 mL by nebulization 4 (Four) Times a Day., Disp: 360 mL, Rfl: 1  •  lisinopril (PRINIVIL,ZESTRIL) 5 MG tablet, Take 1 tablet by mouth Daily., Disp: 30 tablet, Rfl: 0  •  Magnesium 250 MG tablet, Take 250 mg by mouth Daily., Disp: , Rfl:   •  metoprolol succinate XL (TOPROL-XL) 100 MG 24 hr tablet, Take 1 tablet by mouth Daily., Disp: 30 tablet, Rfl: 0  •  rivaroxaban (XARELTO) 20 MG tablet, Take 1 tablet by mouth Daily With Dinner. Indications: Atrial Fibrillation, Disp: 30 tablet, Rfl: 0  •  sennosides-docusate (PERICOLACE) 8.6-50 MG per tablet, Take 2 tablets by mouth 2 (Two) Times a Day., Disp: , Rfl:   •  tiotropium bromide-olodaterol (Stiolto Respimat) 2.5-2.5 MCG/ACT aerosol solution inhaler, Inhale 2 puffs Daily for 30 days., Disp: 4 g, Rfl: 5  •  traZODone (DESYREL) 50 MG tablet, Take 50 mg by mouth At Night As Needed for Sleep., Disp: , Rfl:   •  umeclidinium-vilanterol (Anoro Ellipta) 62.5-25 MCG/INH aerosol powder  inhaler, Inhale 1 puff Daily., Disp: 60 each, Rfl: 5  •  venlafaxine XR (EFFEXOR-XR) 150 MG 24 hr capsule, Take 150 mg by mouth Daily., Disp: , Rfl:     PHYSICAL EXAMINATION:   /93   Pulse 67   Temp 97.5 °F (36.4 °C) (Temporal)   Resp 16   Ht 167.6 cm (65.98\")   Wt 76.2 kg (168 lb)   LMP  (LMP Unknown)   SpO2 94%   BMI 27.13 kg/m²   Pain " Score    09/01/20 1410   PainSc: 0-No pain       ECOG Performance Status: 1 - Symptomatic but completely ambulatory  General Appearance:  alert, cooperative, no apparent distress and appears stated age   Neurologic/Psychiatric: A&O x 3, gait steady, appropriate affect, strength 5/5 in all muscle groups   HEENT:  Normocephalic, without obvious abnormality, mucous membranes moist   Neck: Supple, symmetrical, trachea midline, no adenopathy;  No thyromegaly, masses, or tenderness   Lungs:   Clear to auscultation bilaterally; respirations regular, even, and unlabored bilaterally   Heart:  Regular rate and rhythm, no murmurs appreciated   Abdomen:   Soft, non-tender, non-distended and no organomegaly   Lymph nodes: No cervical, supraclavicular, inguinal or axillary adenopathy noted   Extremities: Normal, atraumatic; no clubbing, cyanosis, or edema    Skin: No rashes, ulcers, or suspicious lesions noted       No visits with results within 2 Week(s) from this visit.   Latest known visit with results is:   Lab on 07/10/2020   Component Date Value Ref Range Status   • Reference Lab Report 07/10/2020    Final    See scanned report     • COVID19 07/10/2020 Not Detected  Not Detected - Ref. Range Final        Ct Chest Without Contrast    Result Date: 8/20/2020  Narrative: EXAMINATION: CT CHEST WO CONTRAST-  INDICATION: Lung nodule, >=1cm; R91.1-Solitary pulmonary nodule.  TECHNIQUE: CT chest without intravenous contrast.  The radiation dose reduction device was turned on for each scan per the ALARA (As Low as Reasonably Achievable) protocol.  COMPARISON: None.  FINDINGS: Thyroid is homogeneous in attenuation. Scattered mediastinal lymph nodes including right paratracheal 19 mm pulmonary nodule. Central airways are patent. Esophagus in normal course and caliber. Atherosclerotic nonaneurysmal thoracic aorta. Cardiac size borderline enlarged without pericardial effusion demonstrating coronary calcifications. Extended lung windows  demonstrate stable appearance of left upper lobe nodular focus measuring 2.3 cm. Calcified granuloma right lung base. Midlung scarring and bronchiectatic changes as well as minimal atelectasis without pleural effusion. Degenerative changes of the thoracic spine without aggressive osseous lesion. No soft tissue body wall findings of concern. Visualized portions of the upper abdomen reveal low-attenuation focus well-defined left hemiliver of hepatic cysts.      Impression: 1. Stable appearance of left upper lobe nodular focus from 06/23/2020 with continued recommended followup in 6 months as this is lung RADS category 3. 2. Chronic changes within the midlungs including bronchiectasis and scarring. 3. Hepatic cyst.  D:  08/20/2020 E:  08/20/2020  This report was finalized on 8/20/2020 7:02 PM by Dr. Julio C Rodríguez.          DIAGNOSTIC DATA:   1. Radiology: As above  2. Dr. Griffin's note from June 2020 reviewed by me and documented in the  chart.   3. Pathology report: None available  4. Laboratory data:    Results for OPAL KAUFMAN (MRN [id number]) as of 9/1/2020 14:40   Ref. Range 6/27/2020 05:03   Glucose Latest Ref Range: 65 - 99 mg/dL 113 (H)   Sodium Latest Ref Range: 136 - 145 mmol/L 140   Potassium Latest Ref Range: 3.5 - 5.2 mmol/L 4.1   CO2 Latest Ref Range: 22.0 - 29.0 mmol/L 27.0   Chloride Latest Ref Range: 98 - 107 mmol/L 105   Anion Gap Latest Ref Range: 5.0 - 15.0 mmol/L 8.0   Creatinine Latest Ref Range: 0.57 - 1.00 mg/dL 0.68   BUN Latest Ref Range: 8 - 23 mg/dL 15   BUN/Creatinine Ratio Latest Ref Range: 7.0 - 25.0  22.1   Calcium Latest Ref Range: 8.6 - 10.5 mg/dL 9.5   eGFR Non African Am Latest Ref Range: >60 mL/min/1.73 87   WBC Latest Ref Range: 3.40 - 10.80 10*3/mm3 17.12 (H)   RBC Latest Ref Range: 3.77 - 5.28 10*6/mm3 4.53   Hemoglobin Latest Ref Range: 12.0 - 15.9 g/dL 14.7   Hematocrit Latest Ref Range: 34.0 - 46.6 % 44.7   RDW Latest Ref Range: 12.3 - 15.4 % 12.6   MCV Latest Ref Range: 79.0  - 97.0 fL 98.7 (H)   MCH Latest Ref Range: 26.6 - 33.0 pg 32.5   MCHC Latest Ref Range: 31.5 - 35.7 g/dL 32.9   MPV Latest Ref Range: 6.0 - 12.0 fL 11.4   Platelets Latest Ref Range: 140 - 450 10*3/mm3 259       ASSESSMENT: The patient is a very pleasant 64 y.o.  female  with recurrent arterial thrombosis    PROBLEM LIST:   1.  Massive left MCA embolic stroke:  A. Status post thrombectomy done by Dr. Beck May 23, 2020  B.  Currently on aspirin 325 mg daily with Xarelto 20 mg daily  2. History of right upper extremity arterial thrombosis 2018:  A.  Data deficient  B.  Treated with thrombectomy followed by Eliquis  3.  Chronic tobacco abuse  4.  Hypercholesterolemia  5.  Atrial fibrillation    PLAN:   1. I had a long discussion today with the patient and her sister-in-law about her  new diagnosis of recurrent arterial thrombosis. I reviewed the patient's documents including refereing provider's notes, lab results, and imaging report.   2.  I explained to the patient her arterial thrombosis most likely induced by peripheral vascular disease as well as chronic tobacco abuse and hypercholesterolemia.  Her atrial fibrillation could also contribute to her arterial thrombosis.  She could have inherited thrombophilia although most of the gene mutations or protein deficiencies are mostly associated with venous thrombosis.  Isolated arterial thrombosis can be seen with antiphospholipid antibodies and PNH.  Her hemoglobin is normal which makes PNH is very unlikely.  The patient has enough risk factors to have peripheral vascular disease that could easily explain her arterial thrombosis.  Even if she does have inherited thrombophilia or antiphospholipid antibodies syndrome treatment will still be the same lifelong anticoagulation with both medicine Xarelto and aspirin since she did the clot on Eliquis before.  3.  I will be on standby at this point.  She will see me in the future on as-needed basis.  Faye Pandya,  MD  9/1/2020

## 2020-10-30 NOTE — TELEPHONE ENCOUNTER
Fax refill request for Rx Stiolto Respimat be sent to [facility]. Rx Anoro has been d/c due to cost.

## 2020-11-10 NOTE — PROGRESS NOTES
Cardiac Electrophysiology Outpatient Follow Up Note            [facility]    Follow Up Office Visit      Lynda Thorne  [id number]  11/09/2020  [unfilled]  [unfilled]    Primary Care Physician: Otoniel Babin MD    Referred By: No ref. provider found    Subjective     Chief Complaint:   Chief Complaint   Patient presents with   • Paroxysmal atrial fibrillation (CMS/HCC)       History of Present Illness:   Ms. Lynda Thorne is a 65 y.o. female who presents to my electrophysiology clinic for follow up of cardioembolic stroke atrial fibrillation.  She has no episodes of A. fib that she is symptomatic from.  She is making progress with respect to her cardioembolic stroke.  She still has significant deficits but clearly is making progress.  No chest pain nausea vomiting fevers or chills.    .      Review of Systems:   Constitutional: No fevers or chills, no recent weight gain or weight loss or fatigue  Eyes: No visual loss, blurred vision, double vision, yellow sclerae.  ENT: No headaches, hearing loss, vertigo, congestion or sore throat.   Cardiovascular: Per HPI  Respiratory: No cough or wheezing, no sputum production, no hematemesis   Gastrointestinal: No abdominal pain, no nausea, vomiting, constipation, diarrhea, melena.   Genitourinary: No dysuria, hematuria or increased frequency.  Musculoskeletal:  No gait disturbance, weakness or joint pain or stiffness  Integumentary: No rashes, urticaria, ulcers or sores.   Neurological: No headache, dizziness, syncope, paralysis, ataxia, no prior CVA/TIA  Psychiatric: No anxiety, or depression  Endocrine: No diaphoresis, cold or heat intolerance. No polyuria or polydipsia.   Hematologic/Lymphatic: No anemia, abnormal bruising or bleeding. No history of DVT/PE.      Past Medical History:   Past Medical History:   Diagnosis Date   • Arthritis    • Asthma    • Depression with anxiety    • Hypertension    •  Stroke (CMS/HCC)        Past Surgical History:   Past Surgical History:   Procedure Laterality Date   • INTERVENTIONAL RADIOLOGY PROCEDURE Bilateral 2020    Procedure: CAROTID CEREBRAL ANGIOGRAM BILATERAL;  Surgeon: Jayden Beck MD;  Location: [facility] INVASIVE LOCATION;  Service: Interventional Radiology;  Laterality: Bilateral;       Family History:   Family History   Problem Relation Age of Onset   • Diabetes Mother    • Hypertension Father        Social History:   Social History     Socioeconomic History   • Marital status:      Spouse name: Not on file   • Number of children: Not on file   • Years of education: Not on file   • Highest education level: Not on file   Tobacco Use   • Smoking status: Former Smoker     Quit date: 2020     Years since quittin.4   • Smokeless tobacco: Never Used   Substance and Sexual Activity   • Alcohol use: Yes     Frequency: Never     Comment: Rare   • Drug use: Defer   • Sexual activity: Defer       Medications:     Current Outpatient Medications:   •  albuterol sulfate  (90 Base) MCG/ACT inhaler, Inhale 2 puffs Every 4 (Four) Hours As Needed for Wheezing., Disp: , Rfl:   •  aspirin 81 MG EC tablet, Take 81 mg by mouth Daily., Disp: , Rfl:   •  atorvastatin (LIPITOR) 40 MG tablet, Take 1 tablet by mouth Every Night., Disp: 30 tablet, Rfl: 0  •  budesonide (PULMICORT) 0.5 MG/2ML nebulizer solution, USE 1 VIAL VIA NEBULIZER TWICE A DAY, Disp: , Rfl:   •  busPIRone (BUSPAR) 10 MG tablet, Take 10 mg by mouth 2 (Two) Times a Day., Disp: , Rfl:   •  dilTIAZem CD (CARDIZEM CD) 360 MG 24 hr capsule, Take 1 capsule by mouth Daily., Disp: 30 capsule, Rfl: 0  •  fluocinonide (LIDEX) 0.05 % external solution, APPLY TWICE WEEKLY AS DIRECTED, Disp: , Rfl:   •  furosemide (LASIX) 40 MG tablet, Take 40 mg by mouth Daily., Disp: , Rfl:   •  ipratropium-albuterol (DUO-NEB) 0.5-2.5 mg/3 ml nebulizer, Take 3 mL by nebulization 4 (Four) Times a Day., Disp: 360  "mL, Rfl: 1  •  lisinopril (PRINIVIL,ZESTRIL) 5 MG tablet, Take 1 tablet by mouth Daily., Disp: 30 tablet, Rfl: 0  •  Magnesium 250 MG tablet, Take 250 mg by mouth Daily., Disp: , Rfl:   •  metoprolol succinate XL (TOPROL-XL) 100 MG 24 hr tablet, Take 1 tablet by mouth Daily., Disp: 30 tablet, Rfl: 0  •  rivaroxaban (XARELTO) 20 MG tablet, Take 1 tablet by mouth Daily With Dinner. Indications: Atrial Fibrillation, Disp: 30 tablet, Rfl: 0  •  sennosides-docusate (PERICOLACE) 8.6-50 MG per tablet, Take 2 tablets by mouth 2 (Two) Times a Day., Disp: , Rfl:   •  tiotropium bromide-olodaterol (STIOLTO RESPIMAT) 2.5-2.5 MCG/ACT aerosol solution inhaler, Inhale 2 puffs Daily. 2 inh once a day, Disp: 3 inhaler, Rfl: 3  •  traZODone (DESYREL) 50 MG tablet, Take 50 mg by mouth At Night As Needed for Sleep., Disp: , Rfl:   •  venlafaxine XR (EFFEXOR-XR) 150 MG 24 hr capsule, Take 150 mg by mouth Daily., Disp: , Rfl:     Allergies:   Allergies   Allergen Reactions   • Msg [Monosodium Glutamate] Shortness Of Breath and Arrhythmia     Reported by pt's son       Objective     Physical Exam:  Vital Signs:   Vitals:    11/09/20 1155   BP: 132/82   BP Location: Left arm   Patient Position: Sitting   Pulse: 73   SpO2: 95%   Weight: 77.1 kg (170 lb)   Height: 167.6 cm (66\")     GEN: Well nourished, well-developed, no acute distress  HEENT: Normocephalic, atraumatic, moist mucous membranes  NECK: Supple, no JVD, no thyromegaly, no lymphadenopathy  CARD: Regular rate and rhythm, normal S1 & S2 are present.  No murmur, gallop or rubs are appreciated.  LUNGS: Clear to auscultation bilateraly, normal respiratory effort  ABDOMEN: Soft, nontender, normal bowel sounds  EXTREMITIES: No gross deformities, no clubbing, cyanosis.  Edema none  SKIN: Warm, dry  NEURO: No focal deficits, alert and oriented x 3  PSYCHIATRIC: Normal affect and mood, appropriate use of semantics and logic.        Lab Results   Component Value Date    GLUCOSE 113 (H) " 06/27/2020    CALCIUM 9.5 06/27/2020     06/27/2020    K 4.1 06/27/2020    CO2 27.0 06/27/2020     06/27/2020    BUN 15 06/27/2020    CREATININE 0.68 06/27/2020    EGFRIFNONA 87 06/27/2020    BCR 22.1 06/27/2020    ANIONGAP 8.0 06/27/2020     Lab Results   Component Value Date    WBC 17.12 (H) 06/27/2020    HGB 14.7 06/27/2020    HCT 44.7 06/27/2020    MCV 98.7 (H) 06/27/2020     06/27/2020     Lab Results   Component Value Date    INR 1.3 (H) 05/23/2020    INR 1.07 05/14/2019    INR 1.1 05/14/2019    PROTIME 15.2 05/23/2020    PROTIME 13.4 05/14/2019    PROTIME 13.5 05/14/2019     No results found for: TSH, D5COYWX, Z9YCHFB, THYROIDAB    Cardiac Testing:     I personally viewed and interpreted the patient's EKG/Telemetry/lab data    Procedures    Tobacco Cessation: N/A  Obstructive Sleep Apnea Screening: N/A    Assessment & Plan      Very pleasant 65-year-old female patient with a history of left MCA cardioembolic stroke due to atrial fibrillation while taking therapeutic doses of apixaban.  Subsequently she was switched to Xarelto and aspirin.  Referral to hematology was completed and their recommendation was to continue Xarelto and aspirin.  We discussed today the option of continuing Xarelto and aspirin with the hope that indeed this is more effective therapy for her.  We also discussed alternatively the option of in addition to continuing Xarelto and aspirin performing left atrial appendage closure percutaneously with a watchman device.  She and I in the presence of her son had a lengthy and detailed conversation.  The son asked excellent questions as well.  All of the patient's and son's questions were answered to their satisfaction.  We gave him some literature about the watchman procedure.  They are going to go home and think this through.  Our clinical nurse specialist for watchman procedures Sophia will contact them next week to see if they have made a decision.    Diagnoses and all  orders for this visit:    1. Cerebrovascular accident (CVA) due to thrombosis of vertebral artery, unspecified blood vessel laterality (CMS/HCC) (Primary)    2. Acute CVA (cerebrovascular accident) (CMS/HCC)    3. Paroxysmal atrial fibrillation (CMS/HCC)            Follow Up:       Thank you for allowing me to participate in the care of your patient. Please to not hesitate to contact me with additional questions or concerns.        Earnest Lugo, DO, FACC, RS  Cardiac Electrophysiologist

## 2020-11-20 PROBLEM — G47.34 NOCTURNAL HYPOXIA: Status: ACTIVE | Noted: 2020-01-01

## 2020-11-20 NOTE — PROGRESS NOTES
Follow Up Office Note       Patient Name: Lynda Thorne    Referring Physician: Jordy Griffin MD    Chief Complaint:    Chief Complaint   Patient presents with   • Emphysema       History of Present Illness: Lynda Thorne is a 65 y.o. female who is here today to follow-up care with Pulmonary.  Patient has a past medical history significant for hypertension, COPD, atrial fibrillation, and a recent CVA on 5/23/2020 status post mechanical thrombectomy and started on Xarelto.     COPD Gold stage IV class D: Doing much better on the Stiolto, using albuterol on an as-needed basis.  No exacerbations or hospitalizations since her last visit.  No prednisone usage.  She has no other complaints.  She is not using oxygen at this point in time throughout the day.    A. Fib: No episodes of A. fib since our last evaluation, she did see cardiology who was considering doing a watchman device on her.  Patient continues on anticoagulation with only having 1 episode of a nosebleed since our last visit.    Lung nodule: Denies any weight loss, fever, chills, nausea, vomiting plans for a CT scan of the chest in February 2020.    Review of Systems:   Review of Systems   Constitutional: Negative for chills, fatigue and fever.   HENT: Negative for congestion and voice change.    Eyes: Negative for blurred vision.   Respiratory: Positive for shortness of breath. Negative for cough and wheezing.    Cardiovascular: Negative for chest pain.   Skin: Negative for dry skin.   Hematological: Negative for adenopathy.   Psychiatric/Behavioral: Negative for agitation and depressed mood.       The following portions of the patient's history were reviewed and updated as appropriate: allergies, current medications, past family history, past medical history, past social history, past surgical history and problem list.    Physical Exam:  Vital Signs:   Vitals:    11/20/20 1110 11/20/20 1116   BP: 140/90    Pulse: 85    Temp: 96.4 °F (35.8 °C)   "  SpO2: (!) 88%  Comment: resting at room air 90%  Comment: resting at room air   Weight: 77.1 kg (170 lb)    Height: 167.6 cm (66\")        Physical Exam  Vitals signs and nursing note reviewed.   Constitutional:       General: She is not in acute distress.     Appearance: She is well-developed and normal weight. She is not ill-appearing or toxic-appearing.   HENT:      Head: Normocephalic and atraumatic.      Right Ear: External ear normal.      Left Ear: External ear normal.      Nose: Nose normal.      Mouth/Throat:      Mouth: Mucous membranes are moist.      Pharynx: Oropharynx is clear.   Eyes:      Extraocular Movements: Extraocular movements intact.      Conjunctiva/sclera: Conjunctivae normal.   Neck:      Musculoskeletal: Normal range of motion and neck supple.   Cardiovascular:      Rate and Rhythm: Normal rate and regular rhythm.      Pulses: Normal pulses.      Heart sounds: Normal heart sounds. No murmur. No gallop.    Pulmonary:      Effort: Pulmonary effort is normal.      Breath sounds: Normal breath sounds. No wheezing, rhonchi or rales.   Abdominal:      General: Bowel sounds are normal. There is no distension.      Palpations: Abdomen is soft.      Tenderness: There is no abdominal tenderness.   Musculoskeletal: Normal range of motion.      Right lower leg: No edema.      Left lower leg: No edema.   Skin:     General: Skin is warm and dry.      Capillary Refill: Capillary refill takes less than 2 seconds.   Neurological:      General: No focal deficit present.      Mental Status: She is alert and oriented to person, place, and time.   Psychiatric:         Mood and Affect: Mood normal.         Thought Content: Thought content normal.         Judgment: Judgment normal.         Results Review:   - imaging from the CT of the chest from August 2020 showing a nodular focus in the left upper lobe measuring roughly 1.6 cm in size and is stable compared to June 2020.                -Lung windows from the " CT scan of the neck from May 23 and June 23, 2020 which showed a 1.6 cm nodule in the left upper lobe with emphysematous changes.  - PFT from personally viewed the patient's PFTs from 7/13/2001 which showed severe obstruction with an FEV1 of 38%, and a severely reduced DLCO.  And significant air trapping.  - Echo from 5/23/2020 which showed an RVSP of 30 mmHg, EF of 66%, with left ventricular systolic function noted to be normal, and normal diastolic function.    Assessment / Plan:   1. Centrilobular emphysema (CMS/HCC) (Primary)  -Currently with gold stage IV class D COPD, although stable and improving overall.  I will continue her on the Stiolto and albuterol prescriptions refilled on today's visit.  She should continue diet and exercise.  Would ultimately benefit from pulmonary rehab, we will again rediscuss this in the future after COVID-19.    2. Paroxysmal atrial fibrillation (CMS/HCC)  -Patient is well controlled, continue to follow cardiology.  Being evaluated for a watchman device.  Notably patient is high risk for general anesthesia for pulmonary complications.  We discussed on this visit for pulmonary complications would entail anything from needing oxygen after the procedure to needing mechanical ventilation.  I did inform her that I do not entirely sure that they would need general anesthesia for the procedure, she have to talk with cardiology more about this.    3. Nodule of upper lobe of left lung  -CT scan already ordered for February 2020, I will review at that time.    4. Nocturnal hypoxia  -Does not need oxygen throughout the day any longer, she checks her oxygen at home and is consistently above 88%.  Although I do suspect that she is dropping at night, I have asked that she continue to wear the oxygen in the evening with sleep.  She verbalized understanding of this and agreed.  I did inform her that this could lead to drying of the nasal airway passages and will use nasal saline to help prevent  nosebleeds.  Especially while she is on Xarelto.    Follow Up:   Return in about 4 months (around 3/20/2021).       JAYCE Willis, DO  Pulmonary and Critical Care Medicine  Note Electronically Signed    Please note that portions of this note may have been completed with a voice recognition program. Efforts were made to edit the dictations, but occasionally words are mistranscribed.

## 2020-12-19 ENCOUNTER — HOSPITAL ENCOUNTER (EMERGENCY)
Age: 65
Discharge: ANOTHER HOSPITAL | End: 2020-12-19
Payer: COMMERCIAL

## 2020-12-19 VITALS
HEART RATE: 88 BPM | OXYGEN SATURATION: 97 % | SYSTOLIC BLOOD PRESSURE: 160 MMHG | RESPIRATION RATE: 16 BRPM | DIASTOLIC BLOOD PRESSURE: 93 MMHG

## 2020-12-19 VITALS
OXYGEN SATURATION: 95 % | HEART RATE: 61 BPM | TEMPERATURE: 98.7 F | SYSTOLIC BLOOD PRESSURE: 157 MMHG | RESPIRATION RATE: 18 BRPM | DIASTOLIC BLOOD PRESSURE: 88 MMHG

## 2020-12-19 VITALS
OXYGEN SATURATION: 95 % | DIASTOLIC BLOOD PRESSURE: 93 MMHG | RESPIRATION RATE: 16 BRPM | SYSTOLIC BLOOD PRESSURE: 166 MMHG | HEART RATE: 66 BPM

## 2020-12-19 VITALS
HEART RATE: 62 BPM | SYSTOLIC BLOOD PRESSURE: 166 MMHG | TEMPERATURE: 97.88 F | RESPIRATION RATE: 16 BRPM | OXYGEN SATURATION: 98 % | DIASTOLIC BLOOD PRESSURE: 74 MMHG

## 2020-12-19 VITALS
DIASTOLIC BLOOD PRESSURE: 96 MMHG | HEART RATE: 57 BPM | OXYGEN SATURATION: 98 % | SYSTOLIC BLOOD PRESSURE: 152 MMHG | RESPIRATION RATE: 17 BRPM

## 2020-12-19 VITALS
RESPIRATION RATE: 16 BRPM | DIASTOLIC BLOOD PRESSURE: 91 MMHG | SYSTOLIC BLOOD PRESSURE: 150 MMHG | HEART RATE: 65 BPM | OXYGEN SATURATION: 97 %

## 2020-12-19 VITALS
OXYGEN SATURATION: 97 % | HEART RATE: 76 BPM | RESPIRATION RATE: 17 BRPM | SYSTOLIC BLOOD PRESSURE: 168 MMHG | DIASTOLIC BLOOD PRESSURE: 88 MMHG

## 2020-12-19 VITALS
OXYGEN SATURATION: 100 % | RESPIRATION RATE: 16 BRPM | SYSTOLIC BLOOD PRESSURE: 142 MMHG | HEART RATE: 64 BPM | DIASTOLIC BLOOD PRESSURE: 94 MMHG

## 2020-12-19 VITALS — BODY MASS INDEX: 27.4 KG/M2

## 2020-12-19 VITALS
SYSTOLIC BLOOD PRESSURE: 140 MMHG | OXYGEN SATURATION: 98 % | DIASTOLIC BLOOD PRESSURE: 93 MMHG | RESPIRATION RATE: 16 BRPM | HEART RATE: 63 BPM

## 2020-12-19 DIAGNOSIS — I10: ICD-10-CM

## 2020-12-19 DIAGNOSIS — J44.9: ICD-10-CM

## 2020-12-19 DIAGNOSIS — I48.91: ICD-10-CM

## 2020-12-19 DIAGNOSIS — I63.9: Primary | ICD-10-CM

## 2020-12-19 DIAGNOSIS — R47.01: ICD-10-CM

## 2020-12-19 DIAGNOSIS — F17.210: ICD-10-CM

## 2020-12-19 DIAGNOSIS — K21.9: ICD-10-CM

## 2020-12-19 DIAGNOSIS — R53.1: ICD-10-CM

## 2020-12-19 PROBLEM — R47.1 DYSARTHRIA: Status: ACTIVE | Noted: 2020-01-01

## 2020-12-19 LAB
ALBUMIN LEVEL: 4.4 G/DL (ref 3.5–5)
ALBUMIN/GLOB SERPL: 1.2 {RATIO} (ref 1.1–1.8)
ALP ISO SERPL-ACNC: 77 U/L (ref 38–126)
ALT SERPLBLD-CCNC: 23 U/L (ref 12–78)
ANION GAP SERPL CALC-SCNC: 8.8 MEQ/L (ref 5–15)
AST SERPL QL: 32 U/L (ref 14–36)
BACTERIA URNS QL MICRO: (no result) /LPF
BILIRUBIN,TOTAL: 0.5 MG/DL (ref 0.2–1.3)
BUN SERPL-MCNC: 19 MG/DL (ref 7–17)
CALCIUM SPEC-MCNC: 9.8 MG/DL (ref 8.4–10.2)
CHLORIDE SPEC-SCNC: 99 MMOL/L (ref 98–107)
CO2 SERPL-SCNC: 36 MMOL/L (ref 22–30)
COLOR UR: YELLOW
CREAT BLD-SCNC: 0.9 MG/DL (ref 0.52–1.04)
CREATININE CLEARANCE ESTIMATED: 66 ML/MIN (ref 50–200)
ESTIMATED GLOMERULAR FILT RATE: 62 ML/MIN (ref 60–?)
GFR (AFRICAN AMERICAN): 76 ML/MIN (ref 60–?)
GLOBULIN SER CALC-MCNC: 3.8 G/DL (ref 1.3–3.2)
GLUCOSE: 92 MG/DL (ref 74–100)
HCO3 BLDV-SCNC: 29.5 MMOL/L (ref 23–30)
HCT VFR BLD CALC: 44.6 % (ref 37–47)
HGB BLD-MCNC: 14.9 G/DL (ref 12.2–16.2)
MCHC RBC-ENTMCNC: 33.5 G/DL (ref 31.8–35.4)
MCV RBC: 96.1 FL (ref 81–99)
MEAN CORPUSCULAR HEMOGLOBIN: 32.2 PG (ref 27–31.2)
MICRO URNS: (no result)
PCO2 BLDV: 55 MMOL/L (ref 35–51)
PH BLDV: 7.35 MMOL/L (ref 7.31–7.41)
PH UR: 7.5 [PH] (ref 5–8.5)
PLATELET # BLD: 265 K/MM3 (ref 142–424)
PO2 BLDV: 37.8 MMOL/L (ref 28–40)
POTASSIUM: 3.8 MMOL/L (ref 3.5–5.1)
PROT SERPL-MCNC: 8.2 G/DL (ref 6.3–8.2)
RBC # BLD AUTO: 4.64 M/MM3 (ref 4.2–5.4)
SODIUM SPEC-SCNC: 140 MMOL/L (ref 136–145)
SP GR UR: 1.01 (ref 1–1.03)
TROPONIN I: < 0.01 NG/ML (ref 0–0.03)
TROPONIN I: < 0.01 NG/ML (ref 0–0.03)
UROBILINOGEN UR QL: 0.2 EU/DL
WBC # BLD AUTO: 11 K/MM3 (ref 4.8–10.8)
WBC # UR: (no result) #/HPF (ref 0–3)

## 2020-12-19 PROCEDURE — 86328 IA NFCT AB SARSCOV2 COVID19: CPT

## 2020-12-19 PROCEDURE — 80053 COMPREHEN METABOLIC PANEL: CPT

## 2020-12-19 PROCEDURE — 99284 EMERGENCY DEPT VISIT MOD MDM: CPT

## 2020-12-19 PROCEDURE — 81001 URINALYSIS AUTO W/SCOPE: CPT

## 2020-12-19 PROCEDURE — 96365 THER/PROPH/DIAG IV INF INIT: CPT

## 2020-12-19 PROCEDURE — 87186 SC STD MICRODIL/AGAR DIL: CPT

## 2020-12-19 PROCEDURE — 87088 URINE BACTERIA CULTURE: CPT

## 2020-12-19 PROCEDURE — 87086 URINE CULTURE/COLONY COUNT: CPT

## 2020-12-19 PROCEDURE — 85025 COMPLETE CBC W/AUTO DIFF WBC: CPT

## 2020-12-19 PROCEDURE — 84484 ASSAY OF TROPONIN QUANT: CPT

## 2020-12-19 PROCEDURE — 70496 CT ANGIOGRAPHY HEAD: CPT

## 2020-12-19 PROCEDURE — 82803 BLOOD GASES ANY COMBINATION: CPT

## 2020-12-19 PROCEDURE — 70450 CT HEAD/BRAIN W/O DYE: CPT

## 2020-12-19 PROCEDURE — 70498 CT ANGIOGRAPHY NECK: CPT

## 2020-12-19 PROCEDURE — 71045 X-RAY EXAM CHEST 1 VIEW: CPT

## 2020-12-19 NOTE — PLAN OF CARE
Goal Outcome Evaluation    Patient has been alert and oriented X4. BP elevated home BP meds restarted BP slowly coming down Diastolic slightly elevated. Has been able to ambulate with stand by assist. Scored a 0 on NIHSS. Pt had been experiencing slurred speech which has since resolved. CT and MRI complete. I & O cath urine sample sent. Passed Dysphagia evaluation. Possible D/C tomorrow per Neuro.

## 2020-12-19 NOTE — H&P
"    [facility] Medicine Services  HISTORY AND PHYSICAL    Patient Name: Lynda Thorne  : 1955  MRN: [id number]  Primary Care Physician: Otoniel Babin MD  Date of admission: 2020      Subjective   Subjective     Chief Complaint: difficulty speaking, weakness    HPI:  Lynda Thorne is a 65 y.o. female sent from [facility] with difficulty speaking. Patient was still awake at 0200 this am when she developed severe weakness. She attempted to call her son at that time but \"felt so weak that she couldn't get words out.\" She had a mild left sided HA/jawache but that has already improved. No vision changes, lateralizing numbness or limb weakness. At [facility] she was thought to have a new V3 occlusion and sent here for further eval. At this time she says she feels totally back to normal aside from mild hoarseness. No f/c, loss of taste/smell, no sick contacts.    Current COVID Risks are:  [] Fever []  Cough [] Shortness of breath [x] Fatigue [] Change in taste or smell    [] Exposure to COVID positive patient  [] High risk facility   []  NONE    Review of Systems   Gen- No fevers, chills  CV- No chest pain, palpitations  Resp- No cough, dyspnea  GI- No N/V/D, abd pain    All other systems reviewed and are negative.     Personal History     Past Medical History:   Diagnosis Date   • Arthritis    • Asthma    • Depression with anxiety    • Hypertension    • Stroke (CMS/HCC)        Past Surgical History:   Procedure Laterality Date   • INTERVENTIONAL RADIOLOGY PROCEDURE Bilateral 2020    Procedure: CAROTID CEREBRAL ANGIOGRAM BILATERAL;  Surgeon: Jayden Beck MD;  Location: [facility] INVASIVE LOCATION;  Service: Interventional Radiology;  Laterality: Bilateral;       Family History: family history includes Diabetes in her mother; Hypertension in her father. Otherwise pertinent FHx was reviewed and unremarkable.     Social History:  reports that she quit " smoking about 6 months ago. She has never used smokeless tobacco. She reports current alcohol use. Drug use questions deferred to the physician.  Social History     Social History Narrative   • Not on file       Medications:  Available home medication information reviewed.  Medications Prior to Admission   Medication Sig Dispense Refill Last Dose   • albuterol sulfate  (90 Base) MCG/ACT inhaler Inhale 2 puffs Every 4 (Four) Hours As Needed for Wheezing. 18 g 5    • aspirin 81 MG EC tablet Take 81 mg by mouth Daily.      • atorvastatin (LIPITOR) 40 MG tablet Take 1 tablet by mouth Every Night. 30 tablet 0    • busPIRone (BUSPAR) 10 MG tablet Take 10 mg by mouth 2 (Two) Times a Day.      • dilTIAZem CD (CARDIZEM CD) 360 MG 24 hr capsule Take 1 capsule by mouth Daily. 30 capsule 0    • fluocinonide (LIDEX) 0.05 % external solution APPLY TWICE WEEKLY AS DIRECTED      • furosemide (LASIX) 40 MG tablet Take 40 mg by mouth Daily.      • ipratropium-albuterol (DUO-NEB) 0.5-2.5 mg/3 ml nebulizer Take 3 mL by nebulization 4 (Four) Times a Day. 360 mL 1    • lisinopril (PRINIVIL,ZESTRIL) 5 MG tablet Take 1 tablet by mouth Daily. 30 tablet 0    • Magnesium 250 MG tablet Take 250 mg by mouth Daily.      • metoprolol succinate XL (TOPROL-XL) 100 MG 24 hr tablet Take 1 tablet by mouth Daily. 30 tablet 0    • rivaroxaban (XARELTO) 20 MG tablet Take 1 tablet by mouth Daily With Dinner. Indications: Atrial Fibrillation 30 tablet 0    • sennosides-docusate (PERICOLACE) 8.6-50 MG per tablet Take 2 tablets by mouth 2 (Two) Times a Day.      • tiotropium bromide-olodaterol (STIOLTO RESPIMAT) 2.5-2.5 MCG/ACT aerosol solution inhaler Inhale 2 puffs Daily. 2 inh once a day 3 inhaler 3    • traZODone (DESYREL) 50 MG tablet Take 50 mg by mouth At Night As Needed for Sleep.      • venlafaxine XR (EFFEXOR-XR) 150 MG 24 hr capsule Take 150 mg by mouth Daily.          Allergies   Allergen Reactions   • Msg [Monosodium Glutamate] Shortness  Of Breath and Arrhythmia     Reported by pt's son       Objective   Objective     Vital Signs:   Temp:  [97.8 °F (36.6 °C)] 97.8 °F (36.6 °C)  Heart Rate:  [92] 92  Resp:  [18] 18  BP: (178)/(99) 178/99       Physical Exam   Constitutional: Awake, alert, appears older than stated age  Eyes: PERRLA, sclerae anicteric, no conjunctival injection  HENT: NCAT, mucous membranes moist  Neck: Supple, no thyromegaly, no lymphadenopathy, trachea midline  Respiratory: Clear to auscultation bilaterally, nonlabored respirations   Cardiovascular: RRR, no murmurs, rubs, or gallops, palpable pedal pulses bilaterally  Gastrointestinal: Positive bowel sounds, soft, nontender, nondistended  Musculoskeletal: No bilateral ankle edema, no clubbing or cyanosis to extremities  Psychiatric: Appropriate affect, cooperative  Neurologic: Oriented x 3, strength symmetric in all extremities, Cranial Nerves grossly intact to confrontation, speech clear  Skin: No rashes    Results Reviewed:  I have personally reviewed most recent indicated data and agree with findings including:  [x]  Laboratory  [x]  Radiology  [x]  EKG/Telemetry  []  Pathology  []  Cardiac/Vascular Studies  [x]  Old records  []  Other:  Most pertinent findings include: NL CBC, CMP. U/A from OSH w/ 2+ bacteria but contaminated w/ squams.     CT head personally reviewed without acute IC disease.     CTA head currently being uploaded. Report suggest new V3 occlusion.       LAB RESULTS:                              Brief Urine Lab Results  (Last result in the past 365 days)      Color   Clarity   Blood   Leuk Est   Nitrite   Protein   CREAT   Urine HCG        05/28/20 1450 Yellow Clear Large (3+) Moderate (2+) Positive 100 mg/dL (2+)             Microbiology Results (last 10 days)     ** No results found for the last 240 hours. **        Imaging Results (Last 24 Hours)     Procedure Component Value Units Date/Time    CT Cerebral Perfusion With & Without Contrast [[id number]]  Resulted: 12/19/20 1020     Updated: 12/19/20 1020        Results for orders placed during the hospital encounter of 05/23/20   Adult Transthoracic Echo Complete W/ Cont if Necessary Per Protocol (With Agitated Saline)    Narrative · Mild mitral valve regurgitation is present.  · Mild tricuspid valve regurgitation is present.  · Calculated right ventricular systolic pressure from tricuspid   regurgitation is 30 mmHg.  · Estimated EF = 66%.  · Left ventricular systolic function is normal.  · Normal right ventricular cavity size, wall thickness, systolic function   and septal motion noted.  · Left ventricular diastolic function is normal.  · No evidence of a patent foramen ovale.  · Mild MAC is present.  · No evidence of pulmonary hypertension is present.  · There is no evidence of pericardial effusion.          Assessment/Plan   Assessment & Plan     Active Hospital Problems    Diagnosis POA   • **Recent MCA CVA while on Eliquis, s/p mechanical thrombectomy 5/23/20 [I63.9] Yes   • Paroxysmal atrial fibrillation (CMS/HCC) [I48.0] Yes   • Essential hypertension [I10] Yes   • Acute CVA (cerebrovascular accident) (CMS/HCC) [I63.9] Yes     66 y/o female with prior hx of CVA presenting as transfer from [facility] w/ dysarthria which has since resolved.    Dysarthria r/o CVA  HTN  --Alternatively could be HTN emergency vs symptom recrudescence due to UTI.  --Stroke navigator has seen. For CTP now. MRI brain ordered. Echo.CTA images to be uploaded.  --Stroke neurology consult.  --Continue asa, high intensity statin. Will resume AC once large CVA excluded and okay with neurology. Allow permissive HTN until CVA r/o.  --PT/OT/SLP/CM.    R/O UTI  --As stated above, her u/a from OSH w/ 2+ bacteria but contaminated. Will recollect with I and O cath.    PAF  --Rate controlled currently. Resume metoprolol/Xarelto once CVA r/o.    DVT prophylaxis: SCDs --> Resume xarelto when able.      CODE STATUS:  Full  Code Status and  Medical Interventions:   Ordered at: 12/19/20 1008     Code Status:    CPR     Medical Interventions (Level of Support Prior to Arrest):    Full       Admission Status:  I believe this patient meets OBSERVATION status, however if further evaluation or treatment plans warrant, status may change.  Based upon current information, I predict patient's care encounter to be less than or equal to 2 midnights.    Lizette Ackerman II, DO  12/19/20

## 2020-12-19 NOTE — NURSING NOTE
ACC REVIEW REPORT: [facility]        PATIENT NAME: Lynda Thorne    PATIENT ID: [id number]      COVID-19 ACC SCREENING       DOES THE PATIENT HAVE A FEVER GREATER THAN OR EQUAL .4:  N0    IS THE PATIENT EXPERIENCING SHORTNESS OF BREATH:  N0    DOES THE PATIENT HAVE A COUGH:  N0    DOES THE PATIENT HAVE ANY OF THE FOLLOWING RISK FACTORS:  N0    EXPOSURE TO SUSPECTED OR KNOWN COVID-19:  N0     RECENT TRAVEL HISTORY TO ENDEMIC AREA (DOMESTIC/LOCAL):   N0    IS THE PATIENT A HEALTHCARE WORKER:   N0    HAS THE PATIENT EXPERIENCED A LOSS OF SENSE OF TASTE OR SMELL:   N0    HAS THE PATIENT BEEN TESTED FOR COVID-19:  YES    DATE TESTED:  12/29/2020    LAB TESTING SENT TO:  ANTIBODY SCREENED AT [facility]       BED:  3G / S355    BED TYPE:  TELEMETRY    BED GIVEN TO:  ALICIA SUN RN    TIME BED GIVEN:  07:13    TODAY'S DATE: 12/19/2020    TRANSFER DATE:   12/29/2020    ETA:  09:00 - 09:30    TRANSFERRING FACILITY:  [facility]    TRANSFERRING FACILITY PHONE # :  [phone]    TRANSFERRING MD:   DR ALFIE PEREA    ACCEPTING PROVIDER: NAVIGATOR - SUSANNE ALEXANDRA RN - (night shift)  ARPIT PALAFOX -  (day shift)    NEUROLOGY PHYSICIAN:  DR PLEITEZ    DATE/TIME REQUEST RECEIVED:  12/29/2020 @ 06:37    [facility] RN:  TASHI DELUCA RN    REPORT FROM:  ALICIA SUN RN    TIME REPORT TAKEN:  07:13    DIAGNOSIS:   CVA    REASON FOR TRANSFER TO [facility]:  Pt has had a previous CVA, needs evaluation at stroke center    TRANSPORTATION:  [facility]    CLINICAL REASON FOR TRANSFER TO [facility]:  The patient woke up at 2 a.m. and was unable to get her words out and had noticed some                                                                                   slurred speech and jaw pain. She called her son who arrived at her house and convinced                                                                                   her to call 911 and go to the Emergency Department at  [facility]. By the                                                                                   time she arrived to the ED her speech was no longer slurred. Her NIHSS was 0.                                                                                    She has had a previous MCA CVA in May of 2020 while on Eliquis, s/p mechanical                                                                                                  thrombectomy,  Plan of care, transfer to [facility] for further evaluation and treatment.       CLINICAL INFORMATION    HEIGHT:  1.6 meters    WEIGHT:  77 kg    ALLERGIES:   MONOSODIUM GLUTAMATE,  Causes shortness of breath and arrhythmia.     INFECTIOUS DISEASE:  None    ISOLATION:  None    VITAL SIGNS:   TIME:  07:00  TEMP:  98.7   PULSE:  60  B/P:  160/80  RESP:  16, O2 Sat on 2L %      LAB INFORMATION:   GLUCOSE 92,  TROPONIN negative x 2    CULTURE INFORMATION:  NONE    MEDS/IV FLUIDS:  IV access # 20 ga angio in the left A/C, patient has received 1 liter of normal saline.       CARDIAC SYSTEM:    CHEST PAIN:  NONE    RHYTHM:  ATRIAL FIBRILLATION    Is patient taking or has patient been given any drugs that could increase bleeding?  YES      DRUG:  XERALTO     DOSE/FREQUENCY:  unknown    TROPONIN:  Checked x 2    DATE:  12/19/2020  TIME:   03:30  TROP:  negative    DATE:  12/19/2020  TIME:  05:30  TROP:  negative    CARDIAC NOTES:  The patient has a history of Atrial Fibrillation.       RESPIRATORY SYSTEM:    LUNG SOUNDS:         OXYGEN:  Yes, 2L/NC, She is also on 2L/NC at home.    O2 SAT:   97%    RESPIRATORY STATUS:  Mrs Thorne has had no respiratory difficulties.       CNS/MUSCULOSKELETAL      FANY COMA SCALE:    E:  4  M:  6    V:  5    LAST KNOWN WELL:  Bedtime 12/18/2020          NIHSS    Survey Item  0: Means Alert  1: Drowsy or Answer Correctly  2: Incorrect, Forced, Can't Resist Gravity  3: Complete or No Effort  4: No Movement  NT: Not Testable  Acceptable As Noted Above      1A: Level of Consciousness: 0    1B: LOC Questions (month, age) : 0    1C: LOC Commands (open/close eyes, make a fist & let go): 0    2:  Best Gaze (eyes open-pt follows examiner's fingers or face): 0    3:  Visual (introduce visual stimulus/threat to pt's visual field quad. Cover 1 eye and hold up fingers in all 4 quadrants) : 0    4.  Facial Palsy (show teeth, raise eyebrows and squeeze eyes tightly shut): 0    5A: Motor Arm-Left (elevate extremity to 90 degrees and score drift/movement.  Count to 10 aloud and use fingers for visual cue): 0    5B:  Motor Arm-Right (elevate extremity to 90 degrees and score drift/movement.  Count to 10 aloud and use fingers for visual cue): 0    6A:  Motor Leg-Left (elevate extremity to 30 degrees and score drift/movement.  Count to 5 out loud and use fingers for visual cue): 0    6B:  Motor Leg-Right (elevate extremity to 30 degrees and score drift/movement.  Count to 5 out loud and use fingers for visual cue): 0    7:  Limb Ataxia- finger to nose, heel down shin: 0    8:  Sensory- pin prick to face, arms, trunk, and legs. Compare sharpness side to side: 0    9:  Best Language- name, items, describe picture, and read sentences.  Do not forget glasses if they normally wear them: 0    10: Dysarthria- elevate speech clarity by pt reading or repeating words on a list: 0    11: Extinction and Inattention- Use information from prior testing or double simultaneous stimuli testing to identify neglect. Face, arms, legs and visual field: 0    Total NIHSS Score:  0  Date:  12/19/2020  Time of NIHSS Assessment:   03:20    CAT SCAN RESULTS:  CTA showed an increase in the stenosis of a previous blockage    CNS/MUSCULOSKELETAL NOTES:  Patient is alert, oriented, and able to ambulate without assistance.       GI//GY      ABDOMINAL PAIN:  none    VOMITING:  none    DIARRHEA:  none    NAUSEA:  none    BOWEL SOUNDS:   active    GI//GY NOTES:  Pt is NPO    PAST  MEDICAL HISTORY:   5/23/2020 CVA, Treated with Mechanical Thrombectomy    OTHER SYMPTOM NOTES:  HTN, A Fib, Chronic Airway Obstruction, Shortness of Air    ADDITIONAL NOTES:  Emergency Contact;  Son -       Elvin Quinteros  Cell Phone - [phone]                                                                               Spouse - Chriss Thorne  Cell Phone - [phone]          Fernanda Bay RN  12/19/2020  07:10 EST

## 2020-12-19 NOTE — CONSULTS
Stroke Consult Note    Patient Name: Lynda Thorne   MRN: [id number]  Age: 65 y.o.  Sex: female  : 1955    Primary Care Physician: Otoniel Babin MD  Referring Physician:  Katiana Wilcox,*    TIME STROKE TEAM CALLED: 0950 EST     TIME PATIENT SEEN: 0955 EST    Handedness: right    Race: white    Chief Complaint/Reason for Consultation: dysarthria    HPI:  Mrs Thorne is a 65year old white, right handed female with known medical diagnosis of left MCA CVA with residual aphasia, dysarthria and right side weakness that has mostly resolved ( left M1 occlusion s/p thrombectomy discharged from our facility 20), HTN, HLD, Afib (on Xarelto), moderate M2 stenosis, left ICA stenosis that presents with complaints of worsening aphasia and dysarthria and pain in left neck.  Symptoms lasted 1-2 hours then resolved.  CTA at OSH reportedly showed new occlusion of right v3 segment and she was transferred to our facility for further evaluation.     She is on ASA and Xarelto at home, compliant with meds. Initial /126    Last Known Normal Date/Time: 0200 EST     Review of Systems   Constitutional: Negative for fever.   HENT: Positive for voice change. Negative for trouble swallowing.         C/o hoarseness   Eyes: Negative for visual disturbance.   Respiratory: Negative for cough and shortness of breath.    Cardiovascular: Negative for chest pain and palpitations.   Gastrointestinal: Positive for constipation. Negative for nausea and vomiting.   Endocrine: Negative for cold intolerance and heat intolerance.   Genitourinary: Negative for difficulty urinating.   Musculoskeletal: Positive for back pain. Negative for arthralgias and gait problem.   Skin: Negative.    Allergic/Immunologic: Negative for immunocompromised state.   Neurological: Positive for speech difficulty. Negative for dizziness, tremors, seizures, syncope, facial asymmetry, weakness, light-headedness, numbness and headaches.    Hematological: Bruises/bleeds easily.   Psychiatric/Behavioral: Negative.         Temp:  [97.8 °F (36.6 °C)] 97.8 °F (36.6 °C)  Heart Rate:  [92] 92  Resp:  [18] 18  BP: (178)/(99) 178/99    Neurological Exam  Mental Status  Alert. Oriented to person, place and time. Recent and remote memory are intact. Speech is normal. Language is fluent with no aphasia. Attention and concentration are normal.    Cranial Nerves  CN II: Visual fields full to confrontation.  CN III, IV, VI: Extraocular movements intact bilaterally.  CN V: Facial sensation is normal.  CN VII: Full and symmetric facial movement.  CN IX, X: Palate elevates symmetrically  CN XI: Shoulder shrug strength is normal.  CN XII: Tongue midline without atrophy or fasciculations.    Motor  Normal muscle bulk throughout. No fasciculations present. Normal muscle tone. Strength is 5/5 throughout all four extremities.    Sensory  Light touch is normal in upper and lower extremities.     Coordination  Finger-to-nose, rapid alternating movements and heel-to-shin normal bilaterally without dysmetria.    Gait  Normal casual, toe, heel and tandem gait.      Physical Exam  Vitals signs and nursing note reviewed.   Constitutional:       Appearance: Normal appearance.   HENT:      Head: Normocephalic and atraumatic.      Mouth/Throat:      Mouth: Mucous membranes are moist.   Eyes:      Extraocular Movements: Extraocular movements intact.   Cardiovascular:      Rate and Rhythm: Normal rate. Rhythm irregular.   Pulmonary:      Effort: Pulmonary effort is normal.   Skin:     General: Skin is warm and dry.   Neurological:      General: No focal deficit present.      Mental Status: She is alert.      Coordination: Coordination is intact.      Deep Tendon Reflexes: Strength normal.   Psychiatric:         Mood and Affect: Mood normal.         Speech: Speech normal.         Behavior: Behavior normal.         Thought Content: Thought content normal.         Judgment: Judgment  normal.         Acute Stroke Data    Alteplase (tPA) Inclusion / Exclusion Criteria    Time: 1000  Person Administering Scale: JAVY Lee    Inclusion Criteria  [x]   18 years of age or greater   []   Onset of symptoms < 4.5 hours before beginning treatment (stroke onset = time patient was last seen well or without symptoms).   []   Diagnosis of acute ischemic stroke causing measurable disabling deficit (Complete Hemianopia, Any Aphasia, Visual or Sensory Extinction, Any weakness limiting sustained effort against gravity)   []   Any remaining deficit considered potentially disabling in view of patient and practitioner   Exclusion criteria (Do not proceed with Alteplase if any are checked under exclusion criteria)  [x]   Onset unknown or GREATER than 4.5 hours   []   ICH on CT/MRI   []   CT demonstrates hypodensity representing acute or subacute infarct   []   Significant head trauma or prior stroke in the previous 3 months   []   Symptoms suggestive of subarachnoid hemorrhage   []   History of un-ruptured intracranial aneurysm GREATER than 10 mm   []   Recent intracranial or intraspinal surgery within the last 3 months   []   Arterial puncture at a non-compressible site in the previous 7 days   []   Active internal bleeding   []   Acute bleeding tendency   []   Platelet count LESS than 100,000 for known hematological diseases such as leukemia, thrombocytopenia or chronic cirrhosis   []   Current use of anticoagulant with INR GREATER than 1.7 or PT GREATER than 15 seconds, aPTT GREATER than 40 seconds   []   Heparin received within 48 hours, resulting in abnormally elevated aPTT GREATER than upper limit of normal   []   Current use of direct thrombin inhibitors or direct factor Xa inhibitors in the past 48 hours   []   Elevated blood pressure refractory to treatment (systolic GREATER than 185 mm/Hg or diastolic  GREATER than 110 mm/Hg   []   Suspected infective endocarditis and aortic arch dissection   []    Current use of therapeutic treatment dose of low-molecular-weight heparin (LMWH) within the previous 24 hours   []   Structural GI malignancy or bleed   Relative exclusion for all patients  [x]   Only minor non-disabling symptoms   []   Pregnancy   []   Seizure at onset with postictal residual neurological impairments   []   Major surgery or previous trauma within past 14 days   []   History of previous spontaneous ICH, intracranial neoplasm, or AV malformation   []   Postpartum (within previous 14 days)   []   Recent GI or urinary tract hemorrhage (within previous 21 days)   []   Recent acute MI (within previous 3 months)   []   History of un-ruptured intracranial aneurysm LESS than 10 mm   []   History of ruptured intracranial aneurysm   []   Blood glucose LESS than 50 mg/dL (2.7 mmol/L)   []   Dural puncture within the last 7 days   []   Known GREATER than 10 cerebral microbleeds   Additional exclusions for patients with symptoms onset between 3 and 4.5 hours.  []   Age > 80.   []   On any anticoagulants regardless of INR  >>> Warfarin (Coumadin), Heparin, Enoxaparin (Lovenox), fondaparinux (Arixtra), bivalirudin (Angiomax), Argatroban, dabigatran (Pradaxa), rivaroxaban (Xarelto), or apixaban (Eliquis)   []   Severe stroke (NIHSS > 25).   []   History of BOTH diabetes and previous ischemic stroke.   []   The risks and benefits have been discussed with the patient or family related to the administration of IV Alteplase for stroke symptoms.   []   I have discussed and reviewed the patient's case and imaging with the attending prior to IV Alteplase.   Not administered Time Alteplase administered       Past Medical History:   Diagnosis Date   • Arthritis    • Asthma    • Depression with anxiety    • Hypertension    • Stroke (CMS/[facility])      Past Surgical History:   Procedure Laterality Date   • INTERVENTIONAL RADIOLOGY PROCEDURE Bilateral 5/23/2020    Procedure: CAROTID CEREBRAL ANGIOGRAM BILATERAL;  Surgeon: Kiran  Jayden CAPONE MD;  Location: [facility] INVASIVE LOCATION;  Service: Interventional Radiology;  Laterality: Bilateral;     Family History   Problem Relation Age of Onset   • Diabetes Mother    • Hypertension Father      Social History     Socioeconomic History   • Marital status:      Spouse name: Not on file   • Number of children: Not on file   • Years of education: Not on file   • Highest education level: Not on file   Tobacco Use   • Smoking status: Former Smoker     Quit date: 2020     Years since quittin.5   • Smokeless tobacco: Never Used   Substance and Sexual Activity   • Alcohol use: Yes     Frequency: Never     Comment: Rare   • Drug use: Defer   • Sexual activity: Defer     Allergies   Allergen Reactions   • Msg [Monosodium Glutamate] Shortness Of Breath and Arrhythmia     Reported by pt's son     Prior to Admission medications    Medication Sig Start Date End Date Taking? Authorizing Provider   albuterol sulfate  (90 Base) MCG/ACT inhaler Inhale 2 puffs Every 4 (Four) Hours As Needed for Wheezing. 20   Jordy Willis,    aspirin 81 MG EC tablet Take 81 mg by mouth Daily.    Pat Jim MD   atorvastatin (LIPITOR) 40 MG tablet Take 1 tablet by mouth Every Night. 20   Jordy Griffin MD   busPIRone (BUSPAR) 10 MG tablet Take 10 mg by mouth 2 (Two) Times a Day.    Pat Jim MD   dilTIAZem CD (CARDIZEM CD) 360 MG 24 hr capsule Take 1 capsule by mouth Daily. 20   Jordy Griffin MD   fluocinonide (LIDEX) 0.05 % external solution APPLY TWICE WEEKLY AS DIRECTED 20   Pat Jim MD   furosemide (LASIX) 40 MG tablet Take 40 mg by mouth Daily. 20   Pat Jim MD   ipratropium-albuterol (DUO-NEB) 0.5-2.5 mg/3 ml nebulizer Take 3 mL by nebulization 4 (Four) Times a Day. 20   Jordy Griffin MD   lisinopril (PRINIVIL,ZESTRIL) 5 MG tablet Take 1 tablet by mouth Daily. 20   West,  Jordy SY MD   Magnesium 250 MG tablet Take 250 mg by mouth Daily.    ProviderPat MD   metoprolol succinate XL (TOPROL-XL) 100 MG 24 hr tablet Take 1 tablet by mouth Daily. 6/28/20   Jordy Griffin MD   rivaroxaban (XARELTO) 20 MG tablet Take 1 tablet by mouth Daily With Dinner. Indications: Atrial Fibrillation 5/30/20   Tess Huynh MD   sennosides-docusate (PERICOLACE) 8.6-50 MG per tablet Take 2 tablets by mouth 2 (Two) Times a Day. 5/30/20   Tess Huynh MD   tiotropium bromide-olodaterol (STIOLTO RESPIMAT) 2.5-2.5 MCG/ACT aerosol solution inhaler Inhale 2 puffs Daily. 2 inh once a day 11/20/20   Jordy Willis DO   traZODone (DESYREL) 50 MG tablet Take 50 mg by mouth At Night As Needed for Sleep.    ProviderPat MD   venlafaxine XR (EFFEXOR-XR) 150 MG 24 hr capsule Take 150 mg by mouth Daily.    ProviderPat MD       Hospital Meds:  Scheduled- aspirin, 325 mg, Oral, Daily    Or  aspirin, 300 mg, Rectal, Daily  atorvastatin, 80 mg, Oral, Nightly  busPIRone, 10 mg, Oral, BID  ipratropium-albuterol, 3 mL, Nebulization, 4x Daily - RT  sennosides-docusate, 2 tablet, Oral, BID  sodium chloride, 10 mL, Intravenous, Q12H  venlafaxine XR, 150 mg, Oral, Daily      Infusions-     PRNs- •  acetaminophen **OR** acetaminophen  •  magnesium sulfate **OR** magnesium sulfate **OR** magnesium sulfate  •  ondansetron **OR** ondansetron  •  potassium chloride **OR** potassium chloride **OR** potassium chloride  •  sodium chloride  •  traZODone    Functional Status Prior to Current Stroke/Rogers Score: 0    NIH Stroke Scale  Time: 10:10 EST  Person Administering Scale: JVAY Lee    1a  Level of consciousness: 0=alert; keenly responsive   1b. LOC questions:  0=Performs both tasks correctly   1c. LOC commands: 0=Performs both tasks correctly   2.  Best Gaze: 0=normal   3.  Visual: 0=No visual loss   4. Facial Palsy: 0=Normal symmetric movement    5a.  Motor left arm: 0=No drift, limb holds 90 (or 45) degrees for full 10 seconds   5b.  Motor right arm: 0=No drift, limb holds 90 (or 45) degrees for full 10 seconds   6a. motor left le=No drift, limb holds 90 (or 45) degrees for full 10 seconds   6b  Motor right le=No drift, limb holds 90 (or 45) degrees for full 10 seconds   7. Limb Ataxia: 0=Absent   8.  Sensory: 0=Normal; no sensory loss   9. Best Language:  0=No aphasia, normal   10. Dysarthria: 0=Normal   11. Extinction and Inattention: 0=No abnormality    Total:   0       Results Reviewed:  I have personally reviewed current lab, radiology, and data and agree with results.  Lab Results (last 24 hours)     ** No results found for the last 24 hours. **        Imaging Results (Last 24 Hours)     ** No results found for the last 24 hours. **        Results for orders placed during the hospital encounter of 20   Adult Transthoracic Echo Complete W/ Cont if Necessary Per Protocol (With Agitated Saline)    Narrative · Mild mitral valve regurgitation is present.  · Mild tricuspid valve regurgitation is present.  · Calculated right ventricular systolic pressure from tricuspid   regurgitation is 30 mmHg.  · Estimated EF = 66%.  · Left ventricular systolic function is normal.  · Normal right ventricular cavity size, wall thickness, systolic function   and septal motion noted.  · Left ventricular diastolic function is normal.  · No evidence of a patent foramen ovale.  · Mild MAC is present.  · No evidence of pulmonary hypertension is present.  · There is no evidence of pericardial effusion.          Assessment/Plan:  Mrs Thorne is a 65year old white, right handed female with known medical diagnosis of former smoker,  left MCA CVA with residual aphasia, dysarthria and right side weakness that has mostly resolved ( left M1 occlusion s/p thrombectomy discharged from our facility 20), HTN, HLD, Afib (on Xarelto), moderate M2 stenosis, left ICA  stenosis that presents with complaints of worsening aphasia, dysarthria and pain in left neck.  Symptoms lasted 1-2 hours then resolved.  CTA at OSH reportedly showed new occlusion of right v3 segment and she was transferred to our facility for further evaluation.     She is on ASA and Xarelto at home, compliant with meds. Initial /126        Imaging  -CT head chronic left mca infarct  -CTA head/neck from OSH reviewed- occluded v3, unchanged from previous exams  -CTP no reversible ischemia    1.  TIA  -initiate TIA/AIS order set  -continue ASA and Xarelto  -could be related to uncontrolled HTN  -permissive HTN today <180  -echo  -lipids in am  -a1c in am  -rule out UTI  -MRI brain without contrast    2.  Right Vertebral Occlusion  -imaging reviewed with Dr Tinoco and Dr Camarillo, both agree no change from previous exam so this is not a new finding    3.  HTN  -permissive HTN today <180  -cardene prn    4. HLD  -continue high intensity statin  -check lipid panel in am    Thank you for the consult. Case discussed with patient, nursing, Dr Tinoco, Dr Camarillo, and Dr Ackerman.    This was a high complex patient and nature of presentation was acute, necessitating evaluation for life/limb saving condition. Greater than 60 minutes was spent examining patient, interpreting radiology studies and collaborating care with other providers.     Carmen Hernandez, APRN  December 19, 2020  10:10 EST

## 2020-12-20 NOTE — THERAPY DISCHARGE NOTE
Acute Care - Occupational Therapy Discharge  [facility]    Patient Name: Lynda Thorne  : 1955    MRN: [id number]                              Today's Date: 2020       Admit Date: 2020    Visit Dx: No diagnosis found.  Patient Active Problem List   Diagnosis   • Acute CVA (cerebrovascular accident) (CMS/HCC)   • Essential hypertension   • Paroxysmal atrial fibrillation (CMS/HCC)   • Recent MCA CVA while on Eliquis, s/p mechanical thrombectomy 20   • Dyspnea   • Weakness   • UTI (urinary tract infection)   • Dysarthria due to recent cerebrovascular accident (CVA)   • Nocturnal hypoxia   • Centrilobular emphysema (CMS/HCC)   • Nodule of upper lobe of left lung   • Elevated LFTs   • Insomnia   • Chronic anticoagulation   • Dysarthria     Past Medical History:   Diagnosis Date   • Arthritis    • Asthma    • Depression with anxiety    • Hypertension    • Stroke (CMS/HCC)      Past Surgical History:   Procedure Laterality Date   • INTERVENTIONAL RADIOLOGY PROCEDURE Bilateral 2020    Procedure: CAROTID CEREBRAL ANGIOGRAM BILATERAL;  Surgeon: Jayden Beck MD;  Location: [facility] INVASIVE LOCATION;  Service: Interventional Radiology;  Laterality: Bilateral;     General Information     Row Name 20 1449          OT Time and Intention    Document Type  discharge evaluation/summary  -JR     Mode of Treatment  occupational therapy  -JR     Row Name 20 1449          General Information    Patient Profile Reviewed  yes  -JR     Prior Level of Function  independent:;gait;transfer;bed mobility;ADL's;home management  -JR     Existing Precautions/Restrictions  fall  -JR     Barriers to Rehab  medically complex  -JR     Row Name 20 1449          Living Environment    Lives With  alone son and ROMELIA in frequently to assist as needed  -JR     Row Name 20 1449          Home Main Entrance    Number of Stairs, Main Entrance  two  -JR     Row Name 20 1449           Cognition    Orientation Status (Cognition)  oriented x 4  -     Row Name 12/20/20 1449          Safety Issues, Functional Mobility    Safety Issues Affecting Function (Mobility)  insight into deficits/self-awareness;awareness of need for assistance  -     Impairments Affecting Function (Mobility)  balance;strength  -       User Key  (r) = Recorded By, (t) = Taken By, (c) = Cosigned By    Initials Name Provider Type    Mar Amaya, OT Occupational Therapist        Mobility/ADL's     Row Name 12/20/20 1450          Activities of Daily Living    BADL Assessment/Intervention  lower body dressing  -     Row Name 12/20/20 1450          Lower Body Dressing Assessment/Training    [place] Level (Lower Body Dressing)  don;doff;socks;independent  -JR     Position (Lower Body Dressing)  supported sitting  -       User Key  (r) = Recorded By, (t) = Taken By, (c) = Cosigned By    Initials Name Provider Type    Mar Amaya OT Occupational Therapist        Obj/Interventions     Row Name 12/20/20 1450          Sensory Assessment (Somatosensory)    Sensory Assessment (Somatosensory)  sensation intact  -     Row Name 12/20/20 1450          Vision Assessment/Intervention    Visual Impairment/Limitations  WFL Pt reports she doesn't have her glasses  -     Row Name 12/20/20 1450          Range of Motion Comprehensive    General Range of Motion  no range of motion deficits identified  -     Row Name 12/20/20 1450          Strength Comprehensive (MMT)    General Manual Muscle Testing (MMT) Assessment  upper extremity strength deficits identified  -     Comment, General Manual Muscle Testing (MMT) Assessment  L UE functionally 4/5, R UE functionally 4-/5. Pt with h/o CVA  -       User Key  (r) = Recorded By, (t) = Taken By, (c) = Cosigned By    Initials Name Provider Type    Mar Amaya, OT Occupational Therapist        Goals/Plan    No documentation.       Clinical Impression     Row  Name 12/20/20 1451          Pain Assessment    Additional Documentation  Pain Scale: Numbers Pre/Post-Treatment (Group)  -     Row Name 12/20/20 1451          Pain Scale: Numbers Pre/Post-Treatment    Pretreatment Pain Rating  0/10 - no pain  -JR     Posttreatment Pain Rating  0/10 - no pain  -     Row Name 12/20/20 1451          Plan of Care Review    Plan of Care Reviewed With  patient  -JR     Outcome Summary  OT initial eval and expanded chart review completed. Pt presents with multiple comorbidities, but appears at baseline with ADL's. Will defer balance and mobility to PT. Recommend home with assist at d/c.  -     Row Name 12/20/20 1451          Therapy Assessment/Plan (OT)    Patient/Family Therapy Goal Statement (OT)  go home  -     Criteria for Skilled Therapeutic Interventions Met (OT)  no;no problems identified which require skilled intervention  -     Therapy Frequency (OT)  evaluation only  -     Row Name 12/20/20 1451          Therapy Plan Review/Discharge Plan (OT)    Anticipated Discharge Disposition (OT)  home with assist  -     Row Name 12/20/20 1451          Vital Signs    Pre Systolic BP Rehab  123  -JR     Pre Treatment Diastolic BP  75  -JR     Post Systolic BP Rehab  133  -JR     Post Treatment Diastolic BP  74  -JR     Pretreatment Heart Rate (beats/min)  78  -JR     Posttreatment Heart Rate (beats/min)  83  -JR     O2 Delivery Pre Treatment  room air  -JR     O2 Delivery Intra Treatment  room air  -JR     Pre Patient Position  Sitting  -JR     Intra Patient Position  Sitting  -JR     Post Patient Position  Sitting  -JR     Row Name 12/20/20 1451          Positioning and Restraints    Pre-Treatment Position  sitting in chair/recliner  -JR     Post Treatment Position  chair  -JR     In Chair  notified nsg;reclined;call light within reach;encouraged to call for assist;exit alarm on  -JR       User Key  (r) = Recorded By, (t) = Taken By, (c) = Cosigned By    Initials Name Provider  Type    Mar Amaya, OT Occupational Therapist        Outcome Measures     Row Name 12/20/20 1454          How much help from another is currently needed...    Putting on and taking off regular lower body clothing?  4  -JR     Bathing (including washing, rinsing, and drying)  4  -JR     Toileting (which includes using toilet bed pan or urinal)  4  -JR     Putting on and taking off regular upper body clothing  4  -JR     Taking care of personal grooming (such as brushing teeth)  4  -JR     Eating meals  4  -JR     AM-PAC 6 Clicks Score (OT)  24  -JR     Row Name 12/20/20 1454          Modified Fifi Scale    Modified [place] Scale  1 - No significant disability despite symptoms.  Able to carry out all usual duties and activities.  -     Row Name 12/20/20 1454          Functional Assessment    Outcome Measure Options  AM-PAC 6 Clicks Daily Activity (OT)  -       User Key  (r) = Recorded By, (t) = Taken By, (c) = Cosigned By    Initials Name Provider Type    Mar Amaya OT Occupational Therapist        Occupational Therapy Education                 Title: PT OT SLP Therapies (In Progress)     Topic: Occupational Therapy (In Progress)     Point: ADL training (In Progress)     Description:   Instruct learner(s) on proper safety adaptation and remediation techniques during self care or transfers.   Instruct in proper use of assistive devices.              Learning Progress Summary           Patient Acceptance, E, NR by  at 12/20/2020 1350    Comment: Educated pt regarding role of therapy and no further need for skilled OT services                   Point: Home exercise program (Not Started)     Description:   Instruct learner(s) on appropriate technique for monitoring, assisting and/or progressing therapeutic exercises/activities.              Learner Progress:  Not documented in this visit.          Point: Precautions (Not Started)     Description:   Instruct learner(s) on prescribed precautions  during self-care and functional transfers.              Learner Progress:  Not documented in this visit.          Point: Body mechanics (Not Started)     Description:   Instruct learner(s) on proper positioning and spine alignment during self-care, functional mobility activities and/or exercises.              Learner Progress:  Not documented in this visit.                      User Key     Initials Effective Dates Name Provider Type [facility] 06/22/15 -  Mar Carrasco OT Occupational Therapist OT              OT Recommendation and Plan  Retired Outcome Summary/Treatment Plan (OT)  Anticipated Discharge Disposition (OT): home with 24/7 care  Therapy Frequency (OT): evaluation only  Plan of Care Review  Plan of Care Reviewed With: patient  Outcome Summary: OT initial eval and expanded chart review completed. Pt presents with multiple comorbidities, but appears at baseline with ADL's. Will defer balance and mobility to PT. Recommend home with assist at d/c.  Plan of Care Reviewed With: patient  Outcome Summary: OT initial eval and expanded chart review completed. Pt presents with multiple comorbidities, but appears at baseline with ADL's. Will defer balance and mobility to PT. Recommend home with assist at d/c.     Time Calculation:   Time Calculation- OT     Row Name 12/20/20 9117             Time Calculation- OT    OT Start Time  1350  -      OT Received On  12/20/20  -        User Key  (r) = Recorded By, (t) = Taken By, (c) = Cosigned By    Initials Name Provider Type     Mar Carrasco OT Occupational Therapist        Therapy Charges for Today     Code Description Service Date Service Provider Modifiers Qty    [id number]  OT EVAL LOW COMPLEXITY 4 12/20/2020 Mar Carrasco OT GO 1               Mar Carrasco OT  12/20/2020

## 2020-12-20 NOTE — PLAN OF CARE
Goal Outcome Evaluation:  Plan of Care Reviewed With: patient  Progress: improving  Pt. Has denied having any pain. NIH= 0. She has worked with PT/OT and sat up in the chair most of the day. VSS, on room air to 2L when asleep, a-fib on tele. Pt. Has had frequency and burning while voiding. She also started having hematuria and a UA was sent. Pt. Was started on IV rocephin. Will continue tomorrow.

## 2020-12-20 NOTE — PLAN OF CARE
Problem: Adult Inpatient Plan of Care  Goal: Plan of Care Review  Recent Flowsheet Documentation  Taken 12/20/2020 0945 by Kisha Dodge PT  Progress: improving  Plan of Care Reviewed With: patient  Outcome Summary: Pt eval complete.  Pt perform bed mobility and transfers independently with SC.  Pt ambulated 150' with SC and SBA with moderate fatigue and VC for heel strike, step length, and posture.  Pt ambulated up and down 2 steps with RHR, CGA, and SC.  VC for sequencing.  Pt presents with weakness and reduced activity tolerance, reviewed HEP for home.  Recommend DC home with assist and home health.

## 2020-12-20 NOTE — PLAN OF CARE
Goal Outcome Evaluation:  Plan of Care Reviewed With: patient  Progress: improving  Outcome Summary: Pt VSS and AOx4.  NIH=2  Drift in R leg and had possible vision deficit in L eye.  Pt has not complained of pain.  Sleeping between care.  Voiding well.  Continuing to monitor.

## 2020-12-20 NOTE — PROGRESS NOTES
[facility] Medicine Services  PROGRESS NOTE    Patient Name: Lynda Thorne  : 1955  MRN: [id number]    Date of Admission: 2020  Primary Care Physician: Otoniel Babin MD    Subjective   Subjective     CC:  weakness    HPI:  Denies any further weakness  Noticed blood in urine this morning. This has never happened before. I/O cath done last night.    ROS:  Gen- No fevers, chills  CV- No chest pain, palpitations  Resp- No cough, dyspnea at baseline  GI- No N/V/D, abd pain        Objective   Objective     Vital Signs:   Temp:  [97.7 °F (36.5 °C)-98.2 °F (36.8 °C)] 98 °F (36.7 °C)  Heart Rate:  [61-96] 66  Resp:  [16-18] 18  BP: (130-155)/() 153/92  Total (NIH Stroke Scale): 0     Physical Exam:  Constitutional - appears a bit fatigued, up in chair  HEENT-NCAT, mucous membranes moist  CV-RRR, S1 S2 normal, no m/r/g  Resp-diminished bilaterally, no wheezes or rhonchi  Abd-soft, nontender, nondistended, normoactive bowel sounds  Ext-No lower extremity cyanosis, clubbing or edema bilaterally  Neuro-alert and oriented, speech clear, moves all extremities   Psych-normal affect   Skin- No rash on exposed UE or LE bilaterally      Results Reviewed:  Results from last 7 days   Lab Units 20  1052   WBC 10*3/mm3 7.17   HEMOGLOBIN g/dL 13.5   HEMATOCRIT % 43.2   PLATELETS 10*3/mm3 222     Results from last 7 days   Lab Units 20  1052   SODIUM mmol/L 136   POTASSIUM mmol/L 3.9   CHLORIDE mmol/L 103   CO2 mmol/L 26.0   BUN mg/dL 14   CREATININE mg/dL 0.58   GLUCOSE mg/dL 92   CALCIUM mg/dL 8.8   ALT (SGPT) U/L 17   AST (SGOT) U/L 19     Estimated Creatinine Clearance: 71.5 mL/min (by C-G formula based on SCr of 0.58 mg/dL).    Microbiology Results Abnormal     Procedure Component Value - Date/Time    COVID PRE-OP / PRE-PROCEDURE SCREENING ORDER (NO ISOLATION) - Swab, Nasopharynx [[id number]]  (Normal) Collected: 20 1142    Lab Status: Final result Specimen:  Swab from Nasopharynx Updated: 12/19/20 1214    Narrative:      The following orders were created for panel order COVID PRE-OP / PRE-PROCEDURE SCREENING ORDER (NO ISOLATION) - Swab, Nasopharynx.  Procedure                               Abnormality         Status                     ---------                               -----------         ------                     COVID-19 and FLU A/B PCR...[[id number]]  Normal              Final result                 Please view results for these tests on the individual orders.    COVID-19 and FLU A/B PCR - Swab, Nasopharynx [[id number]]  (Normal) Collected: 12/19/20 1142    Lab Status: Final result Specimen: Swab from Nasopharynx Updated: 12/19/20 1214     COVID19 Not Detected     Influenza A PCR Not Detected     Influenza B PCR Not Detected    Narrative:      Fact sheet for providers: https://www.fda.gov/media/387486/download    Fact sheet for patients: https://www.fda.gov/media/560265/download    Test performed by PCR.          Imaging Results (Last 24 Hours)     ** No results found for the last 24 hours. **          Results for orders placed during the hospital encounter of 12/19/20   Adult Transthoracic Echo Complete W/ Cont if Necessary Per Protocol (With Agitated Saline)    Narrative · Left ventricular ejection fraction appears to be 56 - 60%. Left   ventricular systolic function is normal.  · Left ventricular diastolic dysfunction is noted.  · Left ventricular wall thickness is consistent with mild concentric   hypertrophy.  · Saline test results are negative.          I have reviewed the medications:  Scheduled Meds:aspirin, 81 mg, Oral, Daily  atorvastatin, 40 mg, Oral, Nightly  busPIRone, 10 mg, Oral, BID  cefTRIAXone, 1 g, Intravenous, Daily  dilTIAZem CD, 360 mg, Oral, Q24H  furosemide, 40 mg, Oral, Daily  ipratropium-albuterol, 3 mL, Nebulization, 4x Daily - RT  lisinopril, 5 mg, Oral, Q24H  metoprolol succinate XL, 100 mg, Oral, Q24H  rivaroxaban, 20 mg, Oral,  Daily With Dinner  sennosides-docusate, 2 tablet, Oral, BID  sodium chloride, 10 mL, Intravenous, Q12H  venlafaxine XR, 150 mg, Oral, Daily      Continuous Infusions:   PRN Meds:.•  acetaminophen **OR** acetaminophen  •  magnesium sulfate **OR** magnesium sulfate **OR** magnesium sulfate  •  ondansetron **OR** ondansetron  •  potassium chloride **OR** potassium chloride **OR** potassium chloride  •  sodium chloride  •  traZODone    Assessment/Plan   Assessment & Plan     Active Hospital Problems    Diagnosis  POA   • **Recent MCA CVA while on Eliquis, s/p mechanical thrombectomy 5/23/20 [I63.9]  Yes   • Dysarthria [R47.1]  Yes   • Paroxysmal atrial fibrillation (CMS/HCC) [I48.0]  Yes   • Essential hypertension [I10]  Yes   • Acute CVA (cerebrovascular accident) (CMS/HCC) [I63.9]  Yes      Resolved Hospital Problems   No resolved problems to display.        Brief Hospital Course to date:  Lynda Thorne is a 65 y.o. female with history of atrial fibrillation on Xarelto, COPD stage IV and left MCA stroke requiring thrombectomy in May presents with word finding difficulty    TIA versus stroke recrudescence due to elevated blood pressure  -Continue Xarelto and aspirin daily  -Atorvastatin  -Neurology checking hypercoagulable panel.  Patient also follows with hematology Dr. Pandya    Hypertension  -Goal systolic pressure less than 130/90  -Titrate up home lisinopril as needed    Atrial fibrillation  -Continue Xarelto and rate controlling medications  -Patient follows up with EP Dr. Laureano and is considering a watchman device    COPD stage IV    Hematuria  -Suspect due to trauma from In/Out cath in the setting of chronic anticoagulation  -Plan to give short course (3 days) of antibiotics  -If continued hematuria or further concern, consider urology referral at discharge.        DVT Prophylaxis: Xarelto      Disposition: I expect the patient to be discharged home in 1 to 2 days.    CODE STATUS:   Code Status and Medical  Interventions:   Ordered at: 12/19/20 1008     Code Status:    CPR     Medical Interventions (Level of Support Prior to Arrest):    Full       Connor Anderson MD  12/20/20

## 2020-12-20 NOTE — SIGNIFICANT NOTE
12/20/20 0922   SLP Deferred Reason   SLP Deferred Reason Patient unavailable for evaluation  (Pt with negative MRI, symptoms resolved. No SLP needs at this time.)

## 2020-12-20 NOTE — THERAPY EVALUATION
Patient Name: Lynda Thorne  : 1955    MRN: [id number]                              Today's Date: 2020       Admit Date: 2020    Visit Dx: No diagnosis found.  Patient Active Problem List   Diagnosis   • Acute CVA (cerebrovascular accident) (CMS/HCC)   • Essential hypertension   • Paroxysmal atrial fibrillation (CMS/HCC)   • Recent MCA CVA while on Eliquis, s/p mechanical thrombectomy 20   • Dyspnea   • Weakness   • UTI (urinary tract infection)   • Dysarthria due to recent cerebrovascular accident (CVA)   • Nocturnal hypoxia   • Centrilobular emphysema (CMS/HCC)   • Nodule of upper lobe of left lung   • Elevated LFTs   • Insomnia   • Chronic anticoagulation   • Dysarthria     Past Medical History:   Diagnosis Date   • Arthritis    • Asthma    • Depression with anxiety    • Hypertension    • Stroke (CMS/HCC)      Past Surgical History:   Procedure Laterality Date   • INTERVENTIONAL RADIOLOGY PROCEDURE Bilateral 2020    Procedure: CAROTID CEREBRAL ANGIOGRAM BILATERAL;  Surgeon: Jayden Beck MD;  Location: [facility] INVASIVE LOCATION;  Service: Interventional Radiology;  Laterality: Bilateral;     General Information     Row Name 20 0945          Physical Therapy Time and Intention    Document Type  evaluation  -AA     Mode of Treatment  physical therapy  -AA     Row Name 20 0945          General Information    Patient Profile Reviewed  yes  -AA     Prior Level of Function  independent:;all household mobility;min assist:;community mobility  -AA     Existing Precautions/Restrictions  fall;oxygen therapy device and L/min  -AA     Barriers to Rehab  previous functional deficit;medically complex  -AA     Row Name 20 0945          Living Environment    Lives With  alone adult son comes over every day to help as needed  -AA     Row Name 20 0945          Home Main Entrance    Number of Stairs, Main Entrance  two  -AA     Stair Railings, Main Entrance  none   -AA     Row Name 12/20/20 0945          Stairs Within Home, Primary    Number of Stairs, Within Home, Primary  none  -AA     Row Name 12/20/20 0945          Cognition    Orientation Status (Cognition)  oriented x 4  -AA     Row Name 12/20/20 0945          Safety Issues, Functional Mobility    Safety Issues Affecting Function (Mobility)  awareness of need for assistance  -AA     Impairments Affecting Function (Mobility)  balance;endurance/activity tolerance;strength  -AA       User Key  (r) = Recorded By, (t) = Taken By, (c) = Cosigned By    Initials Name Provider Type    AA Kisha Dodge, PT Physical Therapist        Mobility     Row Name 12/20/20 0945          Bed Mobility    Bed Mobility  bed mobility (all) activities  -AA     All Activities, [place] (Bed Mobility)  independent  -AA     Comment (Bed Mobility)  good safety and sequencing  -AA     Row Name 12/20/20 0945          Transfers    Comment (Transfers)  good safety and sequenicng  -AA     Row Name 12/20/20 0945          Bed-Chair Transfer    Bed-Chair [place] (Transfers)  independent  -AA     Assistive Device (Bed-Chair Transfers)  cane, straight  -AA     Row Name 12/20/20 0945          Sit-Stand Transfer    Sit-Stand [place] (Transfers)  independent  -AA     Assistive Device (Sit-Stand Transfers)  cane, straight  -AA     Row Name 12/20/20 0945          Gait/Stairs (Locomotion)    [place] Level (Gait)  standby assist  -AA     Assistive Device (Gait)  cane, straight  -AA     Distance in Feet (Gait)  150'  -AA     Deviations/Abnormal Patterns (Gait)  bilateral deviations;flaquito decreased;steppage;stride length decreased;base of support, narrow  -AA     Bilateral Gait Deviations  forward flexed posture  -AA     Left Sided Gait Deviations  heel strike decreased  -AA     Right Sided Gait Deviations  heel strike decreased  -AA     [place] Level (Stairs)  contact guard;verbal cues  -AA     Assistive Device (Stairs)  cane, straight  -AA      Handrail Location (Stairs)  right side (ascending)  -AA     Number of Steps (Stairs)  2  -AA     Ascending Technique (Stairs)  step-to-step  -AA     Descending Technique (Stairs)  step-to-step  -AA     Comment (Gait/Stairs)  Pt ambulated with SC and SBA with VC for step length and heel strike B.  Pt is limited by fatigue.  No LOB, although is unsteady due to fatigue and weakness.  Pt ambulated up and down 2 steps and RHR, CGA, SC.  Step to pattern.  VC for sequencing and posture  -AA       User Key  (r) = Recorded By, (t) = Taken By, (c) = Cosigned By    Initials Name Provider Type    AA Kisha Dodge PT Physical Therapist        Obj/Interventions     Row Name 12/20/20 0945          Range of Motion Comprehensive    General Range of Motion  bilateral lower extremity ROM WNL  -     Row Name 12/20/20 0945          Strength Comprehensive (MMT)    Comment, General Manual Muscle Testing (MMT) Assessment  B hip 3+/5, B knee 4-/5, B ankle 3+/5  -     Row Name 12/20/20 0945          Motor Skills    Therapeutic Exercise  hip;knee;ankle  -     Row Name 12/20/20 0945          Hip (Therapeutic Exercise)    Hip (Therapeutic Exercise)  strengthening exercise;isometric exercises  -     Hip Isometrics (Therapeutic Exercise)  bilateral;gluteal sets;10 repetitions  -     Hip Strengthening (Therapeutic Exercise)  bilateral;marching while seated;aBduction;aDduction;10 repetitions;resistance band;green  -     Row Name 12/20/20 0945          Knee (Therapeutic Exercise)    Knee (Therapeutic Exercise)  strengthening exercise;isometric exercises  -     Knee Isometrics (Therapeutic Exercise)  bilateral;quad sets;10 repetitions  -     Knee Strengthening (Therapeutic Exercise)  bilateral;LAQ (long arc quad);resistance band;green;SLR (straight leg raise);heel slides;10 repetitions  -     Row Name 12/20/20 0945          Ankle (Therapeutic Exercise)    Ankle (Therapeutic Exercise)  strengthening exercise  -     Ankle  Strengthening (Therapeutic Exercise)  bilateral;dorsiflexion;plantarflexion;10 repetitions;resistance band;green  -AA     Row Name 12/20/20 0945          Balance    Balance Assessment  sitting static balance;standing static balance;standing dynamic balance;sitting dynamic balance  -AA     Static Sitting Balance  WNL;unsupported;sitting, edge of bed  -AA     Dynamic Sitting Balance  WFL;unsupported;sitting, edge of bed  -AA     Static Standing Balance  WFL;supported;standing  -AA     Dynamic Standing Balance  mild impairment;supported;standing  -AA     Row Name 12/20/20 0945          Sensory Assessment (Somatosensory)    Sensory Assessment (Somatosensory)  LE sensation intact  -AA       User Key  (r) = Recorded By, (t) = Taken By, (c) = Cosigned By    Initials Name Provider Type    Kisha Valdez, PT Physical Therapist        Goals/Plan     Row Name 12/20/20 0945          Gait Training Goal 1 (PT)    Activity/Assistive Device (Gait Training Goal 1, PT)  gait (walking locomotion);assistive device use  -AA     [place] Level (Gait Training Goal 1, PT)  modified independence  -AA     Distance (Gait Training Goal 1, PT)  200'  -AA     Time Frame (Gait Training Goal 1, PT)  10 days  -AA     Progress/Outcome (Gait Training Goal 1, PT)  goal ongoing  -AA     Row Name 12/20/20 0945          Stairs Goal 1 (PT)    Activity/Assistive Device (Stairs Goal 1, PT)  stairs, all skills  -AA     [place] Level/Cues Needed (Stairs Goal 1, PT)  standby assist  -AA     Number of Stairs (Stairs Goal 1, PT)  2  -AA     Time Frame (Stairs Goal 1, PT)  10 days  -AA     Progress/Outcome (Stairs Goal 1, PT)  goal ongoing  -AA       User Key  (r) = Recorded By, (t) = Taken By, (c) = Cosigned By    Initials Name Provider Type    Kisha Valdez, PT Physical Therapist        Clinical Impression     Row Name 12/20/20 0947          Pain    Additional Documentation  Pain Scale: Numbers Pre/Post-Treatment (Group)  -AA     Row Name  12/20/20 0945          Pain Scale: Numbers Pre/Post-Treatment    Pretreatment Pain Rating  0/10 - no pain  -AA     Posttreatment Pain Rating  0/10 - no pain  -AA     Row Name 12/20/20 0945          Plan of Care Review    Plan of Care Reviewed With  patient  -AA     Progress  improving  -AA     Outcome Summary  Pt eval complete.  Pt perform bed mobility and transfers independently with SC.  Pt ambulated 150' with SC and SBA with moderate fatigue and VC for heel strike, step length, and posture.  Pt ambulated up and down 2 steps with RHR, CGA, and SC.  VC for sequencing.  Pt presents with weakness and reduced activity tolerance, reviewed HEP for home.  Recommend DC home with assist and home health.  -AA     Row Name 12/20/20 0945          Therapy Assessment/Plan (PT)    Rehab Potential (PT)  good, to achieve stated therapy goals  -AA     Criteria for Skilled Interventions Met (PT)  yes;skilled treatment is necessary  -AA     Predicted Duration of Therapy Intervention (PT)  10 days  -AA     Row Name 12/20/20 0945          Vital Signs    Pre Systolic BP Rehab  153  -AA     Pre Treatment Diastolic BP  92  -AA     Intra Systolic BP Rehab  150  -AA     Intra Treatment Diastolic BP  107  -AA     Post Systolic BP Rehab  123  -AA     Post Treatment Diastolic BP  75  -AA     Pre SpO2 (%)  93  -AA     O2 Delivery Pre Treatment  room air  -AA     Intra SpO2 (%)  87  -AA     O2 Delivery Intra Treatment  room air  -AA     Post SpO2 (%)  92  -AA     O2 Delivery Post Treatment  supplemental O2  -AA     Pre Patient Position  Supine  -AA     Intra Patient Position  Standing  -AA     Post Patient Position  Sitting  -AA     Row Name 12/20/20 0945          Positioning and Restraints    Pre-Treatment Position  in bed  -AA     Post Treatment Position  chair  -AA     In Chair  notified nsg;sitting;call light within reach;encouraged to call for assist;exit alarm on  -AA       User Key  (r) = Recorded By, (t) = Taken By, (c) = Cosigned By     Initials Name Provider Type    Kisha Valdez, PT Physical Therapist        Outcome Measures     Row Name 12/20/20 0945          How much help from another person do you currently need...    Turning from your back to your side while in flat bed without using bedrails?  4  -AA     Moving from lying on back to sitting on the side of a flat bed without bedrails?  4  -AA     Moving to and from a bed to a chair (including a wheelchair)?  3  -AA     Standing up from a chair using your arms (e.g., wheelchair, bedside chair)?  4  -AA     Climbing 3-5 steps with a railing?  3  -AA     To walk in hospital room?  3  -AA     AM-PAC 6 Clicks Score (PT)  21  -AA     Row Name 12/20/20 0945          Modified [place] Scale    Pre-Stroke Modified [place] Scale  1 - No significant disability despite symptoms.  Able to carry out all usual duties and activities.  -AA     Modified [place] Scale  2 - Slight disability.  Unable to carry out all previous activities but able to look after own affairs without assistance.  -AA     Row Name 12/20/20 0945          Functional Assessment    Outcome Measure Options  AM-PAC 6 Clicks Basic Mobility (PT);Modified [place]  -AA       User Key  (r) = Recorded By, (t) = Taken By, (c) = Cosigned By    Initials Name Provider Type    Kisha Valdez PT Physical Therapist        Physical Therapy Education                 Title: PT OT SLP Therapies (In Progress)     Topic: Physical Therapy (Done)     Point: Mobility training (Done)     Learning Progress Summary           Patient Acceptance, E,D, VU,NR,DU by MELI at 12/20/2020 0945                   Point: Home exercise program (Done)     Learning Progress Summary           Patient Acceptance, E,D, VU,NR,DU by MELI at 12/20/2020 0945                   Point: Body mechanics (Done)     Learning Progress Summary           Patient Acceptance, E,D, VU,NR,DU by MELI at 12/20/2020 0945                   Point: Precautions (Done)     Learning Progress Summary            Patient Acceptance, E,D, VU,NR,DU by MELI at 12/20/2020 0945                               User Key     Initials Effective Dates Name Provider Type Discipline     04/02/18 -  Kisha Dodge PT Physical Therapist PT              PT Recommendation and Plan  Planned Therapy Interventions (PT): balance training, gait training, home exercise program, patient/family education, strengthening, stair training  Plan of Care Reviewed With: patient  Progress: improving  Outcome Summary: Pt eval complete.  Pt perform bed mobility and transfers independently with SC.  Pt ambulated 150' with SC and SBA with moderate fatigue and VC for heel strike, step length, and posture.  Pt ambulated up and down 2 steps with RHR, CGA, and SC.  VC for sequencing.  Pt presents with weakness and reduced activity tolerance, reviewed HEP for home.  Recommend DC home with assist and home health.     Time Calculation:   PT Charges     Row Name 12/20/20 0945             Time Calculation    Start Time  0945  -AA      PT Received On  12/20/20  -      PT Goal Re-Cert Due Date  12/30/20  -         Time Calculation- PT    Total Timed Code Minutes- PT  12 minute(s)  -         Timed Charges    17641 - PT Therapeutic Exercise Minutes  12  -AA        User Key  (r) = Recorded By, (t) = Taken By, (c) = Cosigned By    Initials Name Provider Type     Kisha Dodge PT Physical Therapist        Therapy Charges for Today     Code Description Service Date Service Provider Modifiers Qty    63448197035 HC PT THER PROC EA 15 MIN 12/20/2020 Kisha Dodge, PT GP 1    56418076111 HC PT EVAL LOW COMPLEXITY 4 12/20/2020 Kisha Dodge, PT GP 1          PT G-Codes  Outcome Measure Options: AM-PAC 6 Clicks Basic Mobility (PT), Modified [place]  AM-PAC 6 Clicks Score (PT): 21  Modified [place] Scale: 2 - Slight disability.  Unable to carry out all previous activities but able to look after own affairs without assistance.    Kisha Dodge PT  12/20/2020

## 2020-12-21 PROBLEM — I63.9 ACUTE CVA (CEREBROVASCULAR ACCIDENT) (HCC): Status: RESOLVED | Noted: 2020-05-23 | Resolved: 2020-01-01

## 2020-12-21 NOTE — PROGRESS NOTES
Discharge Planning Assessment  [facility]     Patient Name: Lynda Thorne  MRN: [id number]  Today's Date: 12/21/2020    Admit Date: 12/19/2020    Discharge Needs Assessment     Row Name 12/21/20 0942       Living Environment    Lives With  alone    Unique Family Situation  Son and sister in law come in daily and assist as needed.    Current Living Arrangements  home/apartment/condo Lives in [place]    Primary Care Provided by  self    Provides Primary Care For  no one, unable/limited ability to care for self    Family Caregiver if Needed  child(omar), adult;other (see comments)    Family Caregiver Names  Tony Quinteros-son    Quality of Family Relationships  helpful;involved;supportive    Able to Return to Prior Arrangements  yes       Resource/Environmental Concerns    Resource/Environmental Concerns  none    Transportation Concerns  car, none       Transition Planning    Patient/Family Anticipates Transition to  home with family    Patient/Family Anticipated Services at Transition      Transportation Anticipated  family or friend will provide       Discharge Needs Assessment    Equipment Currently Used at Home  grab bar;cane, quad;other (see comments) Has a life alert necklace    Concerns to be Addressed  denies needs/concerns at this time    Anticipated Changes Related to Illness  none    Equipment Needed After Discharge  none    Current Discharge Risk  lives alone        Discharge Plan     Row Name 12/21/20 1848       Plan    Plan  Home with family to assist.    Patient/Family in Agreement with Plan  yes    Plan Comments  Met with pt at bs. Pt lives alone in [place]. She is  but  for the last year. Her 2 sons check in on her daily and her sister in law also assists her as needed. She doesn't use her cane in the home but will use when out. She denies need for home health stating that her family will be there to assist her. Pt denies d/c needs at this time. CM will  follow. Ligia ext. [phone]    Final Discharge Disposition Code  01 - home or self-care        Continued Care and Services - Admitted Since 12/19/2020    Coordination has not been started for this encounter.       Expected Discharge Date and Time     Expected Discharge Date Expected Discharge Time    Dec 21, 2020         Demographic Summary     Row Name 12/21/20 0935       General Information    Referral Source  admission list    Reason for Consult  other (see comments) stroke    Preferred Language  English     Used During This Interaction  no    General Information Comments  PCP is Otoniel Babin. Pt does not have a POA.       Contact Information    Permission Granted to Share Info With  ;family/designee Derek Quinteros        Functional Status     Row Name 12/21/20 0937       Functional Status    Usual Activity Tolerance  fair    Current Activity Tolerance  fair       Functional Status, IADL    Medications  independent    Meal Preparation  independent    Housekeeping  assistive person Sister in law helps clean    Laundry  independent    Shopping  assistive person       Employment/    Employment Status  unemployed    Employment/ Comments  Has [employer] [facility] PPO with no concerns. Uses [employer] in [place], but will also use [employer] in [place].        Psychosocial    No documentation.       Abuse/Neglect    No documentation.       Legal    No documentation.       Substance Abuse    No documentation.       Patient Forms    No documentation.           Ligia Mccollum, RN

## 2020-12-21 NOTE — DISCHARGE SUMMARY
[facility] Medicine Services  DISCHARGE SUMMARY    Patient Name: Lynda Thorne  : 1955  MRN: [id number]    Date of Admission: 2020  9:46 AM  Date of Discharge:  20  Primary Care Physician: Otoniel Babin MD    Consults     Date and Time Order Name Status Description    2020 1008 Inpatient Neurology Consult Stroke Completed           Hospital Course     Presenting Problem:   CVA (cerebral vascular accident) (CMS/Formerly [facility]) [I63.9]  CVA (cerebral vascular accident) (CMS/HCC) [I63.9]  Dysarthria [R47.1]    Active Hospital Problems    Diagnosis  POA   • **Recent MCA CVA while on Eliquis, s/p mechanical thrombectomy 20 [I63.9]  Yes   • Dysarthria [R47.1]  Yes   • Paroxysmal atrial fibrillation (CMS/Formerly [facility]) [I48.0]  Yes   • Essential hypertension [I10]  Yes      Resolved Hospital Problems    Diagnosis Date Resolved POA   • Acute CVA (cerebrovascular accident) (CMS/HCC) [I63.9] 2020 Yes          Hospital Course:  Mrs Thorne is a 65year old white, right handed female with known medical diagnosis of left MCA CVA with residual aphasia, dysarthria and right side weakness that has mostly resolved ( left M1 occlusion s/p thrombectomy discharged from our facility 20), HTN, HLD, Afib (on Xarelto), moderate M2 stenosis, left ICA stenosis that presents with complaints of worsening aphasia and dysarthria and pain in left neck.  Symptoms lasted 1-2 hours then resolved.  CTA at OSH reportedly showed new occlusion of right v3 segment and she was transferred to our facility for further evaluation.      She is on ASA and Xarelto at home, compliant with meds. Initial /126        TIA versus stroke recrudescence due to elevated blood pressure  - patient seen and evaluated by neurology Dr Guerrero  - OSH films reviewed by Neurology, showing:   -CT head chronic left mca infarct  -CTA head/neck from OSH reviewed- occluded v3, unchanged from previous exams  -CTP no  reversible ischemia  - MRI brain here showed no acute infarct  - echo EF 56-60%  -Continue Xarelto and aspirin daily  -Atorvastatin  -Neurology checking hypercoagulable panel, results not available at time of discharge.  Patient also follows with hematology Dr. Pandya     Hypertension  -Goal systolic pressure less than 130/90  -Titrate up home lisinopril as needed  - follow up with PCP later this week with BP check     Atrial fibrillation  -Continue Xarelto and rate controlling medications  -Patient follows up with EP Dr. Laureano and is considering a watchman device     COPD stage IV  - encouraged continued smoking cessation     Hematuria  -transient. Resolved by time of discharge  -Suspect due to trauma from In/Out cath in the setting of chronic anticoagulation  -If continued hematuria or further concern, consider urology referral at discharge.  -recommend repeat UA at PCP followup    UTI  - UA suggests UTI, but difficult to interpret in setting of hematuria  - started planned short course of antibiotics, will provide with cefdinir at discharge to complete 5 days therapy  - culture pending at time of discharge      Discharge Follow Up Recommendations for outpatient labs/diagnostics:  BP check at PCP follow up, goal pressures less than 130/90    Suggest repeat UA at pcp follow up -- if continued hematuria, consider Urology referral    PCP may wish to obtain results of hypercoag panel results, when available in 1-2 weeks.      Day of Discharge     HPI:   Feels fine, no further hematuria  Denies fever or chills  No focal weakness    Review of Systems  Gen- No fevers, chills  CV- No chest pain, palpitations  Resp- No cough, dyspnea  GI- No N/V/D, abd pain      Vital Signs:   Temp:  [98 °F (36.7 °C)-98.2 °F (36.8 °C)] 98.1 °F (36.7 °C)  Heart Rate:  [66-97] 88  Resp:  [16-20] 18  BP: (134-161)/() 138/103     Physical Exam:  Constitutional - no acute distress, nontoxic, sitting up on the edge of the bed  HEENT-NCAT,  mucous membranes moist  CV-RRR, S1 S2 normal, no m/r/g  Resp-diminished bilaterally  Abd-soft, nontender, nondistended, normoactive bowel sounds  Ext-No lower extremity cyanosis, clubbing or edema bilaterally  Neuro-alert and oriented, speech clear, moves all extremities   Psych-normal affect   Skin- No rash on exposed UE or LE bilaterally      Pertinent  and/or Most Recent Results     Results from last 7 days   Lab Units 12/19/20  1052   WBC 10*3/mm3 7.17   HEMOGLOBIN g/dL 13.5   HEMATOCRIT % 43.2   PLATELETS 10*3/mm3 222   SODIUM mmol/L 136   POTASSIUM mmol/L 3.9   CHLORIDE mmol/L 103   CO2 mmol/L 26.0   BUN mg/dL 14   CREATININE mg/dL 0.58   GLUCOSE mg/dL 92   CALCIUM mg/dL 8.8     Results from last 7 days   Lab Units 12/19/20  1052   BILIRUBIN mg/dL 0.5   ALK PHOS U/L 72   ALT (SGPT) U/L 17   AST (SGOT) U/L 19     Results from last 7 days   Lab Units 12/19/20  1052   CHOLESTEROL mg/dL 101   TRIGLYCERIDES mg/dL 53   HDL CHOL mg/dL 55   LDL CHOL mg/dL 33     Results from last 7 days   Lab Units 12/19/20  1052   HEMOGLOBIN A1C % 6.00*       Brief Urine Lab Results  (Last result in the past 365 days)      Color   Clarity   Blood   Leuk Est   Nitrite   Protein   CREAT   Urine HCG        12/20/20 1307 Red Cloudy Large (3+) Moderate (2+) Positive >=300 mg/dL (3+)               Microbiology Results Abnormal     Procedure Component Value - Date/Time    COVID PRE-OP / PRE-PROCEDURE SCREENING ORDER (NO ISOLATION) - Swab, Nasopharynx [[id number]]  (Normal) Collected: 12/19/20 1142    Lab Status: Final result Specimen: Swab from Nasopharynx Updated: 12/19/20 1214    Narrative:      The following orders were created for panel order COVID PRE-OP / PRE-PROCEDURE SCREENING ORDER (NO ISOLATION) - Swab, Nasopharynx.  Procedure                               Abnormality         Status                     ---------                               -----------         ------                     COVID-19 and FLU A/B PCR...[[id number]]   Normal              Final result                 Please view results for these tests on the individual orders.    COVID-19 and FLU A/B PCR - Swab, Nasopharynx [[id number]]  (Normal) Collected: 12/19/20 1142    Lab Status: Final result Specimen: Swab from Nasopharynx Updated: 12/19/20 1214     COVID19 Not Detected     Influenza A PCR Not Detected     Influenza B PCR Not Detected    Narrative:      Fact sheet for providers: https://www.fda.gov/media/649333/download    Fact sheet for patients: https://www.fda.gov/media/685222/download    Test performed by PCR.          Imaging Results (All)     Procedure Component Value Units Date/Time    MRI Brain Without Contrast [[id number]] Collected: 12/19/20 1252     Updated: 12/19/20 1258    Narrative:      EXAMINATION: MRI BRAIN WO CONTRAST-      INDICATION: Stroke, follow up     TECHNIQUE: Multiplanar multisequence MRI of the brain performed without  IV contrast.     COMPARISON: 6/23/2020     FINDINGS: No acute infarct noted on diffusion weighted sequences.  Midline structures are unremarkable and the craniocervical junction is  satisfactory in appearance. There are encephalomalacic changes of the  left insular and opercular regions from prior infarct. There are also  superimposed moderate T2 hyperintense periventricular white matter  changes of chronic small vessel ischemia. There is otherwise no evidence  of intracranial hemorrhage, mass or mass effect. Ventricles are  unchanged in size and configuration. The orbits are normal and the  paranasal sinuses are grossly clear.       Impression:      No evidence of acute infarct, hemorrhage, mass or mass  effect.     Redemonstrated white matter changes of chronic small vessel ischemia and  volume loss in the insular and opercular regions on the left, related to  prior infarct.        This report was finalized on 12/19/2020 12:55 PM by Chriss Camarillo.       CT Cerebral Perfusion With & Without Contrast [[id number]] Collected:  12/19/20 1106     Updated: 12/19/20 1118    Narrative:      EXAMINATION: CT CEREBRAL PERFUSION W WO CONTRAST-      INDICATION: Neuro deficit, acute, stroke suspected     TECHNIQUE: Cerebral perfusion analysis was performed using computed  tomography with contrast administration, including post processing of  parametric maps with determination of cerebral blood flow, cerebral  blood volume, and mean transit time.      The radiation dose reduction device was turned on for each scan per the  ALARA (As Low as Reasonably Achievable) protocol.      COMPARISON: Outside noncontrast head CT earlier the same day, MRI June 23, 2020     FINDINGS: There is an area of fairly matched relative diminished  cerebral blood volume and flow involving the left opercular and insular  region corresponding to the area of hypoattenuation noted on outside CT  head and corresponding to known infarct noted on June 2020 MRI. There is  otherwise no evidence of additional core infarct or reversible ischemia.       Impression:      Perfusion abnormality corresponding to the left insular and  opercular infarct noted in June 2020. Otherwise no evidence of new core  infarct or reversible ischemia elsewhere.     This report was finalized on 12/19/2020 11:15 AM by Chriss Camarillo.       XR Outside Films [[id number]] Resulted: 12/19/20 1101     Updated: 12/19/20 1101    Narrative:      This procedure was auto-finalized with no dictation required.    CT Outside Films [[id number]] Resulted: 12/19/20 1054     Updated: 12/19/20 1054    Narrative:      This procedure was auto-finalized with no dictation required.    CT Outside Films [[id number]] Resulted: 12/19/20 1054     Updated: 12/19/20 1054    Narrative:      This procedure was auto-finalized with no dictation required.    CT Outside Films [[id number]] Resulted: 12/19/20 1051     Updated: 12/19/20 1051    Narrative:      This procedure was auto-finalized with no dictation required.          Results for  orders placed during the hospital encounter of 06/23/20   Duplex Carotid Ultrasound CAR    Narrative · Right internal carotid artery stenosis of 0-49%.  · Left internal carotid artery stenosis of 0-49%.  · Antegrade left vertebral artery flow  · Severely reduced, diminutive, antegrade right vertebral artery flow          Results for orders placed during the hospital encounter of 06/23/20   Duplex Carotid Ultrasound CAR    Narrative · Right internal carotid artery stenosis of 0-49%.  · Left internal carotid artery stenosis of 0-49%.  · Antegrade left vertebral artery flow  · Severely reduced, diminutive, antegrade right vertebral artery flow          Results for orders placed during the hospital encounter of 12/19/20   Adult Transthoracic Echo Complete W/ Cont if Necessary Per Protocol (With Agitated Saline)    Narrative · Left ventricular ejection fraction appears to be 56 - 60%. Left   ventricular systolic function is normal.  · Left ventricular diastolic dysfunction is noted.  · Left ventricular wall thickness is consistent with mild concentric   hypertrophy.  · Saline test results are negative.          Plan for Follow-up of Pending Labs/Results: to neurology and my inbox  Pending Labs     Order Current Status    Antiphosphotidyl Antibodies Panel II Collected (12/21/20 7521)    REBECCA In process    Anticardiolipin Antibody, IgG / M, Qn In process    Antiphosphatidylserine IgG / M In process    Antithrombin III In process    Beta-2 Glycoprotein Antibodies In process    Factor 5 Activity In process    Factor 5 Leiden In process    Factor II, DNA Analysis In process    Lupus Anticoagulant In process    Phosphatidylserine Antibodies In process    Protein C Activity In process    Protein C Antigen, Total In process    Protein S Antigen, Free In process    Protein S Antigen, Total In process    Protein S Functional In process    Urine Culture - Urine, Urine, Clean Catch In process        Discharge Details        Discharge  Medications      New Medications      Instructions Start Date   cefdinir 300 MG capsule  Commonly known as: OMNICEF   300 mg, Oral, 2 Times Daily         Continue These Medications      Instructions Start Date   albuterol sulfate  (90 Base) MCG/ACT inhaler  Commonly known as: PROVENTIL HFA;VENTOLIN HFA;PROAIR HFA   2 puffs, Inhalation, Every 4 Hours PRN      aspirin 81 MG EC tablet   81 mg, Oral, Daily      atorvastatin 40 MG tablet  Commonly known as: LIPITOR   40 mg, Oral, Nightly      busPIRone 10 MG tablet  Commonly known as: BUSPAR   10 mg, Oral, 2 Times Daily      dilTIAZem  MG 24 hr capsule  Commonly known as: CARDIZEM CD   360 mg, Oral, Every 24 Hours Scheduled      fluocinonide 0.05 % external solution  Commonly known as: LIDEX   APPLY TWICE WEEKLY AS DIRECTED      furosemide 40 MG tablet  Commonly known as: LASIX   40 mg, Oral, Daily      ipratropium-albuterol 0.5-2.5 mg/3 ml nebulizer  Commonly known as: DUO-NEB   3 mL, Nebulization, 4 Times Daily - RT      lisinopril 5 MG tablet  Commonly known as: PRINIVIL,ZESTRIL   5 mg, Oral, Every 24 Hours Scheduled      Magnesium 250 MG tablet   250 mg, Oral, Daily      metoprolol succinate  MG 24 hr tablet  Commonly known as: TOPROL-XL   100 mg, Oral, Every 24 Hours Scheduled      rivaroxaban 20 MG tablet  Commonly known as: XARELTO   20 mg, Oral, Daily With Dinner      sennosides-docusate 8.6-50 MG per tablet  Commonly known as: PERICOLACE   2 tablets, Oral, 2 Times Daily      tiotropium bromide-olodaterol 2.5-2.5 MCG/ACT aerosol solution inhaler  Commonly known as: STIOLTO RESPIMAT   2 puffs, Inhalation, Daily, 2 inh once a day      traZODone 50 MG tablet  Commonly known as: DESYREL   50 mg, Oral, Nightly PRN      venlafaxine  MG 24 hr capsule  Commonly known as: EFFEXOR-XR   150 mg, Oral, Daily             Allergies   Allergen Reactions   • Msg [Monosodium Glutamate] Shortness Of Breath and Arrhythmia     Reported by pt's son          Discharge Disposition:  Home or Self Care    Diet:  Hospital:  Diet Order   Procedures   • Diet Regular; Cardiac       Activity:      Restrictions or Other Recommendations:         CODE STATUS:    Code Status and Medical Interventions:   Ordered at: 12/19/20 1008     Code Status:    CPR     Medical Interventions (Level of Support Prior to Arrest):    Full       Future Appointments   Date Time Provider Department Center   5/12/2021 11:15 AM Chriss Fontenot PA [facility] None       Additional Instructions for the Follow-ups that You Need to Schedule     Discharge Follow-up with PCP   As directed       Currently Documented PCP:    Otoniel Babin MD    PCP Phone Number:    [phone]     Follow Up Details: followup with PCP within one week                     Connor Anderson MD  12/21/20      Time Spent on Discharge:  I spent  32 minutes on this discharge activity which included: face-to-face encounter with the patient, reviewing the data in the system, coordination of the care with the nursing staff as well as consultants, documentation, and entering orders.

## 2020-12-21 NOTE — PLAN OF CARE
Goal Outcome Evaluation:  Plan of Care Reviewed With: patient  Progress: improving  Outcome Summary: Pt VSS and AOx4.  Pt has not complained of pain.  Hematuria has continued.  Pt sleeping between care.  Continuing to monitor. NIH=2.

## 2020-12-21 NOTE — CONSULTS
"Diabetes Education  Assessment/Teaching    Patient Name:  Lynda Thorne  YOB: 1955  MRN: [id number]  Admit Date:  12/19/2020      Assessment Date:  12/21/2020    Most Recent Value   General Information    Referral From:  Other (comment), MD order [Per stroke protocol]   Height  167.6 cm (66\")   Weight  77.1 kg (170 lb)   Pregnancy Assessment   Diabetes History   What type of diabetes do you have?  Pre-diabetes   Length of Diabetes Diagnosis  Newly diagnosed <6 months   Current DM knowledge  poor   Have you had diabetes education/teaching in the past?  no   Do you test your blood sugar at home?  no   Education Preferences   What areas of diabetes would you like to learn about?  avoiding high blood sugar   Nutrition Information   Assessment Topics   Healthy Eating - Assessment  Needs education   Problem Solving - Assessment  Needs education   Reducing Risk - Assessment  Needs education   Healthy Coping - Assessment  Needs education   Monitoring - Assessment  Needs education   DM Goals   Contact Plan  Follow-up medical care [FU with PCP]            Most Recent Value   DM Education Needs   Meter  Other (comment) [Will discuss with PCP, Dr. Babin, at FU visit]   Blood Glucose Target Range  Ranges per ADA goals for pre diabetes   Reducing Risks  Immunizations, Cardiovascular, Neuropathy, Retinopathy   Physical Activity  None   Healthy Coping  Appropriate   Discharge Plan  Home   Motivation  Moderate   Teaching Method  Explanation, Discussion, Handouts, Teach back   Patient Response  Verbalized understanding, Needs reinforcement        Ms. Thorne gave permission for pre diabetes education. Her current A1c is 6%. She states she has been told, when she has been hospitalized in the past, that she had pre diabetes. She has a significant family history for diabetes.   Educated on Pre-diabetes, diabetes and the disease process, types of DM, diagnosis/A1C, monitoring, signs and symptoms, activity and exercise " and how to prevent disease from progressing. Changing behavior and goal setting relative to diet, exercise and healthy lifestyle was emphasized. Discussed checking BG routinely as recommended per the ADA. Gave her handouts on BG goals, A1c, Pre diabetes Basics booklet, and how to obtain a meter and supplies. Instructed most insurances won't cover a meter and she could obtain a brand such as [employer]'s Reli On, which is reasonably priced. Discussed goals for BG levels and A1c for pre diabetes.   Advised to consult with PCP for further instructions, discuss A1C result, and POC. Discussed could attend an OP diabetes education class via phone or Zoom, but she declined at this time. Thank you for this referral.      Electronically signed by:  Rebecca Corona RN  12/21/20 14:01 EST

## 2020-12-22 NOTE — OUTREACH NOTE
Prep Survey      Responses   [facility] facility patient discharged from?  [place]   Is LACE score < 7 ?  Yes   Emergency Room discharge w/ pulse ox?  No   Eligibility  Readm Mgmt   Discharge diagnosis  TIA versus stroke recrudescence due to elevated blood pressure   Does the patient have one of the following disease processes/diagnoses(primary or secondary)?  Stroke (TIA)   Does the patient have Home health ordered?  No   Is there a DME ordered?  No   Prep survey completed?  Yes          Mar Schaffer RN

## 2020-12-24 NOTE — OUTREACH NOTE
Stroke Week 1 Survey      Responses   [facility] [place] patient discharged from?  [place]   Does the patient have one of the following disease processes/diagnoses(primary or secondary)?  Stroke (TIA)   Week 1 attempt successful?  No   Unsuccessful attempts  Attempt 1          Gayle Rodriguez RN

## 2020-12-28 NOTE — OUTREACH NOTE
Stroke Week 1 Survey      Responses   [facility] patient discharged from?  [place]   Does the patient have one of the following disease processes/diagnoses(primary or secondary)?  Stroke (TIA)   Week 1 attempt successful?  Yes   Call start time  1244   Call end time  1248   Discharge diagnosis  TIA versus stroke recrudescence due to elevated blood pressure   Person spoke with today (if not patient) and relationship  Elvin-joyce    Meds reviewed with patient/caregiver?  Yes   Is the patient having any side effects they believe may be caused by any medication additions or changes?  No   Does the patient have all medications ordered at discharge?  Yes   Is the patient taking all medications as directed (includes completed medication regime)?  Yes   Does the patient have a primary care provider?   Yes   Does the patient have an appointment with their PCP within 7 days of discharge?  Yes   Comments regarding PCP  12/28/20   Has the patient kept scheduled appointments due by today?  Yes   Psychosocial issues?  No   Does the patient require any assistance with activities of daily living such as eating, bathing, dressing, walking, etc.?  No   Does the patient have any residual symptoms from stroke/TIA?  No   Did the patient receive a copy of their discharge instructions?  Yes   Nursing interventions  Reviewed instructions with patient   What is the patient's perception of their health status since discharge?  Improving   Nursing interventions  Nurse provided patient education   Is the patient able to teach back FAST for Stroke?  No [Provided education]   Is the patient/caregiver able to teach back the risk factors for a stroke?  Smoking, Diabetes   Is the patient/caregiver able to teach back signs and symptoms related to disease process for when to call PCP?  Yes   Is the patient/caregiver able to teach back signs and symptoms related to disease process for when to call 911?  Yes   If the patient is a current smoker,  are they able to teach back resources for cessation?  Not a smoker   Is the patient/caregiver able to teach back the hierarchy of who to call/visit for symptoms/problems? PCP, Specialist, Home health nurse, Urgent Care, ED, 911  Yes   Week 1 call completed?  Yes   Revoked  No further contact(revokes)-requires comment   Is the patient interested in additional calls from an ambulatory ?  NOTE:  applies to high risk patients requiring additional follow-up.  No   Graduated/Revoked comments  Pt is doing and has followed up with PCP           Shiela Styles RN

## 2020-12-30 NOTE — TELEPHONE ENCOUNTER
Spoke to patient and informed her what her results were per Dr. Guerrero in an encounter with her and Mar earlier.

## 2020-12-30 NOTE — TELEPHONE ENCOUNTER
DELETE AFTER REVIEWING: Telephone encounter to be sent to the clinical pool.    Caller: OPAL KAUFMAN    Relationship: SELF    Best call back number: [phone]    Caller requesting test results: SELF    What test was performed: LABS    When was the test performed: 12/19/20    Where was the test performed: [facility]    Additional notes: PT RECEIVED CALL TO REVIEW LAB RESULTS FROM OUR OFFICE. PLEASE REACH OUT TO PT.        DELETE AFTER READING TO PATIENT: “Thank you for sharing this information with me. I will send a message to the clinical team. Please allow 48 hours for the clinical staff to follow up on this request.”

## 2021-01-01 ENCOUNTER — TELEPHONE (OUTPATIENT)
Dept: CARDIOLOGY | Facility: CLINIC | Age: 66
End: 2021-01-01

## 2021-01-01 ENCOUNTER — APPOINTMENT (OUTPATIENT)
Dept: LAB | Facility: HOSPITAL | Age: 66
End: 2021-01-01

## 2021-01-01 ENCOUNTER — READMISSION MANAGEMENT (OUTPATIENT)
Dept: CALL CENTER | Facility: HOSPITAL | Age: 66
End: 2021-01-01

## 2021-01-01 ENCOUNTER — PREP FOR SURGERY (OUTPATIENT)
Dept: OTHER | Facility: HOSPITAL | Age: 66
End: 2021-01-01

## 2021-01-01 ENCOUNTER — ANESTHESIA (OUTPATIENT)
Dept: CARDIOLOGY | Facility: HOSPITAL | Age: 66
End: 2021-01-01

## 2021-01-01 ENCOUNTER — HOSPITAL ENCOUNTER (OUTPATIENT)
Dept: CARDIOLOGY | Facility: HOSPITAL | Age: 66
Discharge: HOME | End: 2021-02-09

## 2021-01-01 ENCOUNTER — OFFICE VISIT (OUTPATIENT)
Dept: CARDIOLOGY | Facility: CLINIC | Age: 66
End: 2021-01-01

## 2021-01-01 ENCOUNTER — ANESTHESIA EVENT (OUTPATIENT)
Dept: CARDIOLOGY | Facility: HOSPITAL | Age: 66
End: 2021-01-01

## 2021-01-01 ENCOUNTER — APPOINTMENT (OUTPATIENT)
Dept: GENERAL RADIOLOGY | Facility: HOSPITAL | Age: 66
End: 2021-01-01

## 2021-01-01 ENCOUNTER — HOSPITAL ENCOUNTER (INPATIENT)
Facility: HOSPITAL | Age: 66
LOS: 1 days | Discharge: HOME | End: 2021-02-10
Attending: INTERNAL MEDICINE | Admitting: INTERNAL MEDICINE

## 2021-01-01 ENCOUNTER — TRANSCRIBE ORDERS (OUTPATIENT)
Dept: PULMONOLOGY | Facility: CLINIC | Age: 66
End: 2021-01-01

## 2021-01-01 ENCOUNTER — HOSPITAL ENCOUNTER (OUTPATIENT)
Dept: CARDIOLOGY | Facility: HOSPITAL | Age: 66
Discharge: HOME | End: 2021-04-13
Admitting: NURSE PRACTITIONER

## 2021-01-01 ENCOUNTER — HOSPITAL ENCOUNTER (OUTPATIENT)
Dept: CT IMAGING | Facility: HOSPITAL | Age: 66
Discharge: HOME | End: 2021-02-07

## 2021-01-01 ENCOUNTER — APPOINTMENT (OUTPATIENT)
Dept: CT IMAGING | Facility: HOSPITAL | Age: 66
End: 2021-01-01

## 2021-01-01 ENCOUNTER — APPOINTMENT (OUTPATIENT)
Dept: PREADMISSION TESTING | Facility: HOSPITAL | Age: 66
End: 2021-01-01

## 2021-01-01 ENCOUNTER — HOSPITAL ENCOUNTER (EMERGENCY)
Facility: HOSPITAL | Age: 66
Discharge: HOME | End: 2021-04-30
Attending: EMERGENCY MEDICINE | Admitting: EMERGENCY MEDICINE

## 2021-01-01 ENCOUNTER — HOSPITAL ENCOUNTER (OUTPATIENT)
Dept: CT IMAGING | Facility: HOSPITAL | Age: 66
Discharge: HOME | End: 2021-04-23
Admitting: INTERNAL MEDICINE

## 2021-01-01 VITALS
HEIGHT: 66 IN | BODY MASS INDEX: 27.71 KG/M2 | WEIGHT: 172.4 LBS | TEMPERATURE: 97.6 F | SYSTOLIC BLOOD PRESSURE: 144 MMHG | HEART RATE: 102 BPM | DIASTOLIC BLOOD PRESSURE: 78 MMHG | OXYGEN SATURATION: 91 % | RESPIRATION RATE: 18 BRPM

## 2021-01-01 VITALS
TEMPERATURE: 98.1 F | OXYGEN SATURATION: 91 % | SYSTOLIC BLOOD PRESSURE: 128 MMHG | BODY MASS INDEX: 27.32 KG/M2 | RESPIRATION RATE: 20 BRPM | WEIGHT: 170 LBS | DIASTOLIC BLOOD PRESSURE: 92 MMHG | HEART RATE: 78 BPM | HEIGHT: 66 IN

## 2021-01-01 VITALS
RESPIRATION RATE: 22 BRPM | HEART RATE: 62 BPM | HEIGHT: 66 IN | OXYGEN SATURATION: 99 % | WEIGHT: 170 LBS | SYSTOLIC BLOOD PRESSURE: 127 MMHG | BODY MASS INDEX: 27.32 KG/M2 | DIASTOLIC BLOOD PRESSURE: 79 MMHG

## 2021-01-01 VITALS — HEIGHT: 66 IN | BODY MASS INDEX: 27.32 KG/M2 | WEIGHT: 170 LBS

## 2021-01-01 VITALS
BODY MASS INDEX: 27.32 KG/M2 | HEIGHT: 66 IN | SYSTOLIC BLOOD PRESSURE: 114 MMHG | TEMPERATURE: 98.2 F | DIASTOLIC BLOOD PRESSURE: 70 MMHG | HEART RATE: 81 BPM | WEIGHT: 170 LBS

## 2021-01-01 DIAGNOSIS — I63.019 CEREBROVASCULAR ACCIDENT (CVA) DUE TO THROMBOSIS OF VERTEBRAL ARTERY, UNSPECIFIED BLOOD VESSEL LATERALITY (HCC): ICD-10-CM

## 2021-01-01 DIAGNOSIS — I48.0 PAROXYSMAL ATRIAL FIBRILLATION (HCC): ICD-10-CM

## 2021-01-01 DIAGNOSIS — I48.0 PAROXYSMAL ATRIAL FIBRILLATION (HCC): Primary | ICD-10-CM

## 2021-01-01 DIAGNOSIS — R91.1 NODULE OF UPPER LOBE OF LEFT LUNG: ICD-10-CM

## 2021-01-01 DIAGNOSIS — R79.89 ELEVATED LFTS: ICD-10-CM

## 2021-01-01 DIAGNOSIS — R91.1 NODULE OF UPPER LOBE OF LEFT LUNG: Primary | ICD-10-CM

## 2021-01-01 DIAGNOSIS — J44.1 COPD WITH ACUTE EXACERBATION (HCC): Primary | ICD-10-CM

## 2021-01-01 DIAGNOSIS — I63.9 CARDIOEMBOLIC STROKE (HCC): ICD-10-CM

## 2021-01-01 DIAGNOSIS — Z95.818 PRESENCE OF WATCHMAN LEFT ATRIAL APPENDAGE CLOSURE DEVICE: Primary | ICD-10-CM

## 2021-01-01 DIAGNOSIS — I10 ESSENTIAL HYPERTENSION: Primary | ICD-10-CM

## 2021-01-01 DIAGNOSIS — R59.1 LYMPHADENOPATHY: ICD-10-CM

## 2021-01-01 DIAGNOSIS — Z95.818 PRESENCE OF WATCHMAN LEFT ATRIAL APPENDAGE CLOSURE DEVICE: ICD-10-CM

## 2021-01-01 LAB
ACT BLD: 307 SECONDS (ref 82–152)
ALBUMIN SERPL-MCNC: 3.9 G/DL (ref 3.5–5.2)
ALBUMIN/GLOB SERPL: 1.3 G/DL
ALP SERPL-CCNC: 85 U/L (ref 39–117)
ALT SERPL W P-5'-P-CCNC: 11 U/L (ref 1–33)
ANION GAP SERPL CALCULATED.3IONS-SCNC: 7 MMOL/L (ref 5–15)
ANION GAP SERPL CALCULATED.3IONS-SCNC: 8 MMOL/L (ref 5–15)
ARTERIAL PATENCY WRIST A: ABNORMAL
ASCENDING AORTA: 3.2 CM
AST SERPL-CCNC: 33 U/L (ref 1–32)
ATMOSPHERIC PRESS: ABNORMAL MM[HG]
BASE EXCESS BLDA CALC-SCNC: 7.7 MMOL/L (ref 0–2)
BASOPHILS # BLD AUTO: 0.04 10*3/MM3 (ref 0–0.2)
BASOPHILS NFR BLD AUTO: 0.5 % (ref 0–1.5)
BDY SITE: ABNORMAL
BH CV ECHO MEAS - BSA(HAYCOCK): 1.9 M^2
BH CV ECHO MEAS - BSA(HAYCOCK): 1.9 M^2
BH CV ECHO MEAS - BSA: 1.9 M^2
BH CV ECHO MEAS - BSA: 1.9 M^2
BH CV ECHO MEAS - BZI_BMI: 27.4 KILOGRAMS/M^2
BH CV ECHO MEAS - BZI_BMI: 27.4 KILOGRAMS/M^2
BH CV ECHO MEAS - BZI_METRIC_HEIGHT: 167.6 CM
BH CV ECHO MEAS - BZI_METRIC_HEIGHT: 167.6 CM
BH CV ECHO MEAS - BZI_METRIC_WEIGHT: 77.1 KG
BH CV ECHO MEAS - BZI_METRIC_WEIGHT: 77.1 KG
BH CV ECHO MEAS - RAP SYSTOLE: 8 MMHG
BH CV ECHO MEAS - RAP SYSTOLE: 8 MMHG
BH CV ECHO MEAS - RVSP: 39 MMHG
BH CV ECHO MEAS - RVSP: 48 MMHG
BH CV ECHO MEAS - TR MAX PG: 31 MMHG
BH CV ECHO MEAS - TR MAX PG: 40 MMHG
BH CV ECHO MEAS - TR MAX VEL: 280 CM/SEC
BH CV ECHO MEAS - TR MAX VEL: 317.3 CM/SEC
BH CV VAS BP LEFT ARM: NORMAL MMHG
BILIRUB SERPL-MCNC: 0.3 MG/DL (ref 0–1.2)
BODY TEMPERATURE: 37 C
BUN SERPL-MCNC: 18 MG/DL (ref 8–23)
BUN SERPL-MCNC: 21 MG/DL (ref 8–23)
BUN/CREAT SERPL: 18.8 (ref 7–25)
BUN/CREAT SERPL: 20.7 (ref 7–25)
CALCIUM SPEC-SCNC: 9.3 MG/DL (ref 8.6–10.5)
CALCIUM SPEC-SCNC: 9.3 MG/DL (ref 8.6–10.5)
CHLORIDE SERPL-SCNC: 100 MMOL/L (ref 98–107)
CHLORIDE SERPL-SCNC: 103 MMOL/L (ref 98–107)
CO2 BLDA-SCNC: 33.4 MMOL/L (ref 22–33)
CO2 SERPL-SCNC: 30 MMOL/L (ref 22–29)
CO2 SERPL-SCNC: 32 MMOL/L (ref 22–29)
COHGB MFR BLD: 1.1 % (ref 0–2)
CREAT BLDA-MCNC: 1.2 MG/DL (ref 0.6–1.3)
CREAT SERPL-MCNC: 0.87 MG/DL (ref 0.57–1)
CREAT SERPL-MCNC: 1.12 MG/DL (ref 0.57–1)
DEPRECATED RDW RBC AUTO: 43.1 FL (ref 37–54)
DEPRECATED RDW RBC AUTO: 48.3 FL (ref 37–54)
EOSINOPHIL # BLD AUTO: 0.34 10*3/MM3 (ref 0–0.4)
EOSINOPHIL NFR BLD AUTO: 3.9 % (ref 0.3–6.2)
ERYTHROCYTE [DISTWIDTH] IN BLOOD BY AUTOMATED COUNT: 12.3 % (ref 12.3–15.4)
ERYTHROCYTE [DISTWIDTH] IN BLOOD BY AUTOMATED COUNT: 13.3 % (ref 12.3–15.4)
FLUAV RNA RESP QL NAA+PROBE: NOT DETECTED
FLUBV RNA RESP QL NAA+PROBE: NOT DETECTED
GFR SERPL CREATININE-BSD FRML MDRD: 49 ML/MIN/1.73
GFR SERPL CREATININE-BSD FRML MDRD: 65 ML/MIN/1.73
GLOBULIN UR ELPH-MCNC: 3.1 GM/DL
GLUCOSE SERPL-MCNC: 128 MG/DL (ref 65–99)
GLUCOSE SERPL-MCNC: 155 MG/DL (ref 65–99)
HCO3 BLDA-SCNC: 32.1 MMOL/L (ref 20–26)
HCT VFR BLD AUTO: 39.6 % (ref 34–46.6)
HCT VFR BLD AUTO: 41.3 % (ref 34–46.6)
HCT VFR BLD CALC: 40.6 %
HGB BLD-MCNC: 12.8 G/DL (ref 12–15.9)
HGB BLD-MCNC: 13.9 G/DL (ref 12–15.9)
HGB BLDA-MCNC: 13.2 G/DL (ref 14–18)
HOLD SPECIMEN: NORMAL
IMM GRANULOCYTES # BLD AUTO: 0.02 10*3/MM3 (ref 0–0.05)
IMM GRANULOCYTES NFR BLD AUTO: 0.2 % (ref 0–0.5)
INHALED O2 CONCENTRATION: 28 %
LV EF 2D ECHO EST: 55 %
LV EF 2D ECHO EST: 55 %
LYMPHOCYTES # BLD AUTO: 1.08 10*3/MM3 (ref 0.7–3.1)
LYMPHOCYTES NFR BLD AUTO: 12.5 % (ref 19.6–45.3)
MCH RBC QN AUTO: 31.9 PG (ref 26.6–33)
MCH RBC QN AUTO: 32.2 PG (ref 26.6–33)
MCHC RBC AUTO-ENTMCNC: 32.3 G/DL (ref 31.5–35.7)
MCHC RBC AUTO-ENTMCNC: 33.7 G/DL (ref 31.5–35.7)
MCV RBC AUTO: 95.6 FL (ref 79–97)
MCV RBC AUTO: 98.8 FL (ref 79–97)
METHGB BLD QL: 0.4 % (ref 0–1.5)
MODALITY: ABNORMAL
MONOCYTES # BLD AUTO: 0.74 10*3/MM3 (ref 0.1–0.9)
MONOCYTES NFR BLD AUTO: 8.6 % (ref 5–12)
NEUTROPHILS NFR BLD AUTO: 6.42 10*3/MM3 (ref 1.7–7)
NEUTROPHILS NFR BLD AUTO: 74.3 % (ref 42.7–76)
NOTE: ABNORMAL
NRBC BLD AUTO-RTO: 0 /100 WBC (ref 0–0.2)
NT-PROBNP SERPL-MCNC: 1195 PG/ML (ref 0–900)
OXYHGB MFR BLDV: 90.3 % (ref 94–99)
PCO2 BLDA: 43.1 MM HG (ref 35–45)
PCO2 TEMP ADJ BLD: 43.1 MM HG (ref 35–45)
PH BLDA: 7.48 PH UNITS (ref 7.35–7.45)
PH, TEMP CORRECTED: 7.48 PH UNITS
PLATELET # BLD AUTO: 250 10*3/MM3 (ref 140–450)
PLATELET # BLD AUTO: 276 10*3/MM3 (ref 140–450)
PMV BLD AUTO: 11 FL (ref 6–12)
PMV BLD AUTO: 9.8 FL (ref 6–12)
PO2 BLDA: 59.9 MM HG (ref 83–108)
PO2 TEMP ADJ BLD: 59.9 MM HG (ref 83–108)
POTASSIUM SERPL-SCNC: 3.9 MMOL/L (ref 3.5–5.2)
POTASSIUM SERPL-SCNC: 3.9 MMOL/L (ref 3.5–5.2)
PROT SERPL-MCNC: 7 G/DL (ref 6–8.5)
QT INTERVAL: 384 MS
QT INTERVAL: 426 MS
QTC INTERVAL: 469 MS
QTC INTERVAL: 474 MS
RBC # BLD AUTO: 4.01 10*6/MM3 (ref 3.77–5.28)
RBC # BLD AUTO: 4.32 10*6/MM3 (ref 3.77–5.28)
SARS-COV-2 RNA RESP QL NAA+PROBE: NOT DETECTED
SARS-COV-2 RNA RESP QL NAA+PROBE: NOT DETECTED
SODIUM SERPL-SCNC: 140 MMOL/L (ref 136–145)
SODIUM SERPL-SCNC: 140 MMOL/L (ref 136–145)
TROPONIN T SERPL-MCNC: <0.01 NG/ML (ref 0–0.03)
VENTILATOR MODE: ABNORMAL
WBC # BLD AUTO: 8.45 10*3/MM3 (ref 3.4–10.8)
WBC # BLD AUTO: 8.64 10*3/MM3 (ref 3.4–10.8)
WHOLE BLOOD HOLD SPECIMEN: NORMAL
WHOLE BLOOD HOLD SPECIMEN: NORMAL

## 2021-01-01 PROCEDURE — 99284 EMERGENCY DEPT VISIT MOD MDM: CPT

## 2021-01-01 PROCEDURE — 93320 DOPPLER ECHO COMPLETE: CPT | Performed by: INTERNAL MEDICINE

## 2021-01-01 PROCEDURE — C1769 GUIDE WIRE: HCPCS | Performed by: INTERNAL MEDICINE

## 2021-01-01 PROCEDURE — 25010000002 FENTANYL CITRATE (PF) 100 MCG/2ML SOLUTION: Performed by: NURSE ANESTHETIST, CERTIFIED REGISTERED

## 2021-01-01 PROCEDURE — 87636 SARSCOV2 & INF A&B AMP PRB: CPT | Performed by: EMERGENCY MEDICINE

## 2021-01-01 PROCEDURE — 25010000002 NEOSTIGMINE 10 MG/10ML SOLUTION: Performed by: NURSE ANESTHETIST, CERTIFIED REGISTERED

## 2021-01-01 PROCEDURE — C1893 INTRO/SHEATH, FIXED,NON-PEEL: HCPCS | Performed by: INTERNAL MEDICINE

## 2021-01-01 PROCEDURE — 71045 X-RAY EXAM CHEST 1 VIEW: CPT

## 2021-01-01 PROCEDURE — 93320 DOPPLER ECHO COMPLETE: CPT

## 2021-01-01 PROCEDURE — 36600 WITHDRAWAL OF ARTERIAL BLOOD: CPT

## 2021-01-01 PROCEDURE — 82565 ASSAY OF CREATININE: CPT

## 2021-01-01 PROCEDURE — 85027 COMPLETE CBC AUTOMATED: CPT | Performed by: NURSE PRACTITIONER

## 2021-01-01 PROCEDURE — 94799 UNLISTED PULMONARY SVC/PX: CPT

## 2021-01-01 PROCEDURE — S0260 H&P FOR SURGERY: HCPCS | Performed by: NURSE PRACTITIONER

## 2021-01-01 PROCEDURE — 93355 ECHO TRANSESOPHAGEAL (TEE): CPT

## 2021-01-01 PROCEDURE — 93355 ECHO TRANSESOPHAGEAL (TEE): CPT | Performed by: INTERNAL MEDICINE

## 2021-01-01 PROCEDURE — 25010000002 PROTAMINE SULFATE PER 10 MG: Performed by: INTERNAL MEDICINE

## 2021-01-01 PROCEDURE — 25010000002 PROPOFOL 10 MG/ML EMULSION: Performed by: NURSE ANESTHETIST, CERTIFIED REGISTERED

## 2021-01-01 PROCEDURE — 85025 COMPLETE CBC W/AUTO DIFF WBC: CPT | Performed by: EMERGENCY MEDICINE

## 2021-01-01 PROCEDURE — 25010000002 ONDANSETRON PER 1 MG: Performed by: NURSE ANESTHETIST, CERTIFIED REGISTERED

## 2021-01-01 PROCEDURE — 25010000002 FENTANYL CITRATE (PF) 100 MCG/2ML SOLUTION: Performed by: NURSE PRACTITIONER

## 2021-01-01 PROCEDURE — C1894 INTRO/SHEATH, NON-LASER: HCPCS | Performed by: INTERNAL MEDICINE

## 2021-01-01 PROCEDURE — 80053 COMPREHEN METABOLIC PANEL: CPT | Performed by: EMERGENCY MEDICINE

## 2021-01-01 PROCEDURE — 33340 PERQ CLSR TCAT L ATR APNDGE: CPT | Performed by: INTERNAL MEDICINE

## 2021-01-01 PROCEDURE — U0004 COV-19 TEST NON-CDC HGH THRU: HCPCS

## 2021-01-01 PROCEDURE — 93312 ECHO TRANSESOPHAGEAL: CPT

## 2021-01-01 PROCEDURE — 0 IOPAMIDOL PER 1 ML: Performed by: EMERGENCY MEDICINE

## 2021-01-01 PROCEDURE — 93010 ELECTROCARDIOGRAM REPORT: CPT | Performed by: INTERNAL MEDICINE

## 2021-01-01 PROCEDURE — 82805 BLOOD GASES W/O2 SATURATION: CPT

## 2021-01-01 PROCEDURE — 85347 COAGULATION TIME ACTIVATED: CPT

## 2021-01-01 PROCEDURE — 93325 DOPPLER ECHO COLOR FLOW MAPG: CPT

## 2021-01-01 PROCEDURE — 93799 UNLISTED CV SVC/PROCEDURE: CPT | Performed by: INTERNAL MEDICINE

## 2021-01-01 PROCEDURE — 25010000003 CEFAZOLIN IN DEXTROSE 2-4 GM/100ML-% SOLUTION: Performed by: NURSE PRACTITIONER

## 2021-01-01 PROCEDURE — 93312 ECHO TRANSESOPHAGEAL: CPT | Performed by: INTERNAL MEDICINE

## 2021-01-01 PROCEDURE — 82375 ASSAY CARBOXYHB QUANT: CPT

## 2021-01-01 PROCEDURE — 84484 ASSAY OF TROPONIN QUANT: CPT | Performed by: EMERGENCY MEDICINE

## 2021-01-01 PROCEDURE — 93325 DOPPLER ECHO COLOR FLOW MAPG: CPT | Performed by: INTERNAL MEDICINE

## 2021-01-01 PROCEDURE — 25010000002 METHYLPREDNISOLONE PER 40 MG: Performed by: EMERGENCY MEDICINE

## 2021-01-01 PROCEDURE — 99213 OFFICE O/P EST LOW 20 MIN: CPT | Performed by: PHYSICIAN ASSISTANT

## 2021-01-01 PROCEDURE — 71275 CT ANGIOGRAPHY CHEST: CPT

## 2021-01-01 PROCEDURE — 93005 ELECTROCARDIOGRAM TRACING: CPT | Performed by: EMERGENCY MEDICINE

## 2021-01-01 PROCEDURE — 94640 AIRWAY INHALATION TREATMENT: CPT

## 2021-01-01 PROCEDURE — 25010000003 LIDOCAINE 1 % SOLUTION: Performed by: INTERNAL MEDICINE

## 2021-01-01 PROCEDURE — 25010000002 MIDAZOLAM PER 1 MG: Performed by: NURSE PRACTITIONER

## 2021-01-01 PROCEDURE — 02L73DK OCCLUSION OF LEFT ATRIAL APPENDAGE WITH INTRALUMINAL DEVICE, PERCUTANEOUS APPROACH: ICD-10-PCS | Performed by: INTERNAL MEDICINE

## 2021-01-01 PROCEDURE — 83050 HGB METHEMOGLOBIN QUAN: CPT

## 2021-01-01 PROCEDURE — 25010000002 HEPARIN (PORCINE) PER 1000 UNITS: Performed by: INTERNAL MEDICINE

## 2021-01-01 PROCEDURE — 99152 MOD SED SAME PHYS/QHP 5/>YRS: CPT

## 2021-01-01 PROCEDURE — 96374 THER/PROPH/DIAG INJ IV PUSH: CPT

## 2021-01-01 PROCEDURE — 0 IOPAMIDOL PER 1 ML: Performed by: INTERNAL MEDICINE

## 2021-01-01 PROCEDURE — 99231 SBSQ HOSP IP/OBS SF/LOW 25: CPT | Performed by: NURSE PRACTITIONER

## 2021-01-01 PROCEDURE — 99152 MOD SED SAME PHYS/QHP 5/>YRS: CPT | Performed by: INTERNAL MEDICINE

## 2021-01-01 PROCEDURE — C9803 HOPD COVID-19 SPEC COLLECT: HCPCS

## 2021-01-01 PROCEDURE — 71250 CT THORAX DX C-: CPT

## 2021-01-01 PROCEDURE — 83880 ASSAY OF NATRIURETIC PEPTIDE: CPT | Performed by: EMERGENCY MEDICINE

## 2021-01-01 PROCEDURE — C1760 CLOSURE DEV, VASC: HCPCS | Performed by: INTERNAL MEDICINE

## 2021-01-01 PROCEDURE — 80048 BASIC METABOLIC PNL TOTAL CA: CPT

## 2021-01-01 PROCEDURE — 93005 ELECTROCARDIOGRAM TRACING: CPT | Performed by: ANESTHESIOLOGY

## 2021-01-01 PROCEDURE — 25010000002 DEXAMETHASONE PER 1 MG: Performed by: NURSE ANESTHETIST, CERTIFIED REGISTERED

## 2021-01-01 PROCEDURE — 25010000002 METHYLPREDNISOLONE PER 125 MG: Performed by: NURSE ANESTHETIST, CERTIFIED REGISTERED

## 2021-01-01 DEVICE — LEFT ATRIAL APPENDAGE CLOSURE DEVICE WITH DELIVERY SYSTEM
Type: IMPLANTABLE DEVICE | Status: FUNCTIONAL
Brand: WATCHMAN FLX™

## 2021-01-01 RX ORDER — HEPARIN SODIUM 1000 [USP'U]/ML
INJECTION, SOLUTION INTRAVENOUS; SUBCUTANEOUS AS NEEDED
Status: DISCONTINUED | OUTPATIENT
Start: 2021-01-01 | End: 2021-01-01 | Stop reason: HOSPADM

## 2021-01-01 RX ORDER — ATORVASTATIN CALCIUM 40 MG/1
40 TABLET, FILM COATED ORAL NIGHTLY
Status: DISCONTINUED | OUTPATIENT
Start: 2021-01-01 | End: 2021-01-01 | Stop reason: HOSPADM

## 2021-01-01 RX ORDER — BUPIVACAINE HYDROCHLORIDE 5 MG/ML
INJECTION, SOLUTION PERINEURAL AS NEEDED
Status: DISCONTINUED | OUTPATIENT
Start: 2021-01-01 | End: 2021-01-01 | Stop reason: HOSPADM

## 2021-01-01 RX ORDER — SODIUM CHLORIDE 0.9 % (FLUSH) 0.9 %
1-10 SYRINGE (ML) INJECTION AS NEEDED
Status: CANCELLED | OUTPATIENT
Start: 2021-01-01

## 2021-01-01 RX ORDER — LIDOCAINE HYDROCHLORIDE 10 MG/ML
0.5 INJECTION, SOLUTION EPIDURAL; INFILTRATION; INTRACAUDAL; PERINEURAL ONCE AS NEEDED
Status: DISCONTINUED | OUTPATIENT
Start: 2021-01-01 | End: 2021-01-01 | Stop reason: HOSPADM

## 2021-01-01 RX ORDER — TRAZODONE HYDROCHLORIDE 50 MG/1
50 TABLET ORAL NIGHTLY
Status: DISCONTINUED | OUTPATIENT
Start: 2021-01-01 | End: 2021-01-01 | Stop reason: HOSPADM

## 2021-01-01 RX ORDER — METHYLPREDNISOLONE SODIUM SUCCINATE 40 MG/ML
40 INJECTION, POWDER, LYOPHILIZED, FOR SOLUTION INTRAMUSCULAR; INTRAVENOUS ONCE
Status: COMPLETED | OUTPATIENT
Start: 2021-01-01 | End: 2021-01-01

## 2021-01-01 RX ORDER — CEFAZOLIN SODIUM 2 G/100ML
2 INJECTION, SOLUTION INTRAVENOUS ONCE
Status: CANCELLED | OUTPATIENT
Start: 2021-01-01 | End: 2021-01-01

## 2021-01-01 RX ORDER — DEXAMETHASONE SODIUM PHOSPHATE 4 MG/ML
8 INJECTION, SOLUTION INTRA-ARTICULAR; INTRALESIONAL; INTRAMUSCULAR; INTRAVENOUS; SOFT TISSUE ONCE AS NEEDED
Status: DISCONTINUED | OUTPATIENT
Start: 2021-01-01 | End: 2021-01-01 | Stop reason: HOSPADM

## 2021-01-01 RX ORDER — PROPOFOL 10 MG/ML
VIAL (ML) INTRAVENOUS AS NEEDED
Status: DISCONTINUED | OUTPATIENT
Start: 2021-01-01 | End: 2021-01-01 | Stop reason: SURG

## 2021-01-01 RX ORDER — METHYLPREDNISOLONE SODIUM SUCCINATE 125 MG/2ML
INJECTION, POWDER, LYOPHILIZED, FOR SOLUTION INTRAMUSCULAR; INTRAVENOUS AS NEEDED
Status: DISCONTINUED | OUTPATIENT
Start: 2021-01-01 | End: 2021-01-01 | Stop reason: SURG

## 2021-01-01 RX ORDER — SODIUM CHLORIDE 0.9 % (FLUSH) 0.9 %
3 SYRINGE (ML) INJECTION EVERY 12 HOURS SCHEDULED
Status: CANCELLED | OUTPATIENT
Start: 2021-01-01

## 2021-01-01 RX ORDER — NITROGLYCERIN 0.4 MG/1
0.4 TABLET SUBLINGUAL
Status: CANCELLED | OUTPATIENT
Start: 2021-01-01

## 2021-01-01 RX ORDER — LABETALOL HYDROCHLORIDE 5 MG/ML
5 INJECTION, SOLUTION INTRAVENOUS
Status: DISCONTINUED | OUTPATIENT
Start: 2021-01-01 | End: 2021-01-01 | Stop reason: HOSPADM

## 2021-01-01 RX ORDER — DEXAMETHASONE SODIUM PHOSPHATE 10 MG/ML
INJECTION INTRAMUSCULAR; INTRAVENOUS AS NEEDED
Status: DISCONTINUED | OUTPATIENT
Start: 2021-01-01 | End: 2021-01-01 | Stop reason: SURG

## 2021-01-01 RX ORDER — FENTANYL CITRATE 50 UG/ML
50 INJECTION, SOLUTION INTRAMUSCULAR; INTRAVENOUS
Status: DISCONTINUED | OUTPATIENT
Start: 2021-01-01 | End: 2021-01-01 | Stop reason: HOSPADM

## 2021-01-01 RX ORDER — METOPROLOL SUCCINATE 100 MG/1
100 TABLET, EXTENDED RELEASE ORAL
Status: DISCONTINUED | OUTPATIENT
Start: 2021-01-01 | End: 2021-01-01 | Stop reason: HOSPADM

## 2021-01-01 RX ORDER — LIDOCAINE HYDROCHLORIDE 10 MG/ML
INJECTION, SOLUTION INFILTRATION; PERINEURAL AS NEEDED
Status: DISCONTINUED | OUTPATIENT
Start: 2021-01-01 | End: 2021-01-01 | Stop reason: HOSPADM

## 2021-01-01 RX ORDER — LIDOCAINE HYDROCHLORIDE 20 MG/ML
INJECTION, SOLUTION INFILTRATION; PERINEURAL AS NEEDED
Status: DISCONTINUED | OUTPATIENT
Start: 2021-01-01 | End: 2021-01-01 | Stop reason: SURG

## 2021-01-01 RX ORDER — SODIUM CHLORIDE 9 MG/ML
INJECTION, SOLUTION INTRAVENOUS CONTINUOUS PRN
Status: DISCONTINUED | OUTPATIENT
Start: 2021-01-01 | End: 2021-01-01 | Stop reason: SURG

## 2021-01-01 RX ORDER — ONDANSETRON 2 MG/ML
INJECTION INTRAMUSCULAR; INTRAVENOUS AS NEEDED
Status: DISCONTINUED | OUTPATIENT
Start: 2021-01-01 | End: 2021-01-01 | Stop reason: SURG

## 2021-01-01 RX ORDER — FAMOTIDINE 20 MG/1
20 TABLET, FILM COATED ORAL ONCE
Status: COMPLETED | OUTPATIENT
Start: 2021-01-01 | End: 2021-01-01

## 2021-01-01 RX ORDER — SODIUM CHLORIDE 0.9 % (FLUSH) 0.9 %
10 SYRINGE (ML) INJECTION EVERY 12 HOURS SCHEDULED
Status: DISCONTINUED | OUTPATIENT
Start: 2021-01-01 | End: 2021-01-01 | Stop reason: HOSPADM

## 2021-01-01 RX ORDER — SODIUM CHLORIDE, SODIUM LACTATE, POTASSIUM CHLORIDE, CALCIUM CHLORIDE 600; 310; 30; 20 MG/100ML; MG/100ML; MG/100ML; MG/100ML
9 INJECTION, SOLUTION INTRAVENOUS CONTINUOUS
Status: DISCONTINUED | OUTPATIENT
Start: 2021-01-01 | End: 2021-01-01 | Stop reason: HOSPADM

## 2021-01-01 RX ORDER — SODIUM CHLORIDE 0.9 % (FLUSH) 0.9 %
3 SYRINGE (ML) INJECTION EVERY 12 HOURS SCHEDULED
Status: DISCONTINUED | OUTPATIENT
Start: 2021-01-01 | End: 2021-01-01 | Stop reason: HOSPADM

## 2021-01-01 RX ORDER — GLYCOPYRROLATE 0.2 MG/ML
INJECTION INTRAMUSCULAR; INTRAVENOUS AS NEEDED
Status: DISCONTINUED | OUTPATIENT
Start: 2021-01-01 | End: 2021-01-01 | Stop reason: SURG

## 2021-01-01 RX ORDER — SODIUM CHLORIDE 0.9 % (FLUSH) 0.9 %
10 SYRINGE (ML) INJECTION AS NEEDED
Status: DISCONTINUED | OUTPATIENT
Start: 2021-01-01 | End: 2021-01-01 | Stop reason: HOSPADM

## 2021-01-01 RX ORDER — FAMOTIDINE 10 MG/ML
20 INJECTION, SOLUTION INTRAVENOUS ONCE
Status: DISCONTINUED | OUTPATIENT
Start: 2021-01-01 | End: 2021-01-01 | Stop reason: HOSPADM

## 2021-01-01 RX ORDER — PYRIDOXINE HCL (VITAMIN B6) 100 MG
1 TABLET ORAL DAILY
COMMUNITY

## 2021-01-01 RX ORDER — ROCURONIUM BROMIDE 10 MG/ML
INJECTION, SOLUTION INTRAVENOUS AS NEEDED
Status: DISCONTINUED | OUTPATIENT
Start: 2021-01-01 | End: 2021-01-01 | Stop reason: SURG

## 2021-01-01 RX ORDER — NEOSTIGMINE METHYLSULFATE 1 MG/ML
INJECTION, SOLUTION INTRAVENOUS AS NEEDED
Status: DISCONTINUED | OUTPATIENT
Start: 2021-01-01 | End: 2021-01-01 | Stop reason: SURG

## 2021-01-01 RX ORDER — POTASSIUM CHLORIDE 1500 MG/1
20 TABLET, FILM COATED, EXTENDED RELEASE ORAL DAILY PRN
Qty: 30 TABLET | Refills: 5 | Status: SHIPPED | OUTPATIENT
Start: 2021-01-01

## 2021-01-01 RX ORDER — PROTAMINE SULFATE 10 MG/ML
INJECTION, SOLUTION INTRAVENOUS AS NEEDED
Status: DISCONTINUED | OUTPATIENT
Start: 2021-01-01 | End: 2021-01-01 | Stop reason: HOSPADM

## 2021-01-01 RX ORDER — HEPARIN SODIUM 10000 [USP'U]/100ML
INJECTION, SOLUTION INTRAVENOUS CONTINUOUS PRN
Status: DISCONTINUED | OUTPATIENT
Start: 2021-01-01 | End: 2021-01-01 | Stop reason: HOSPADM

## 2021-01-01 RX ORDER — ASPIRIN 81 MG/1
81 TABLET ORAL DAILY
Status: DISCONTINUED | OUTPATIENT
Start: 2021-01-01 | End: 2021-01-01 | Stop reason: HOSPADM

## 2021-01-01 RX ORDER — IPRATROPIUM BROMIDE AND ALBUTEROL SULFATE 2.5; .5 MG/3ML; MG/3ML
3 SOLUTION RESPIRATORY (INHALATION) ONCE
Status: COMPLETED | OUTPATIENT
Start: 2021-01-01 | End: 2021-01-01

## 2021-01-01 RX ORDER — ONDANSETRON 2 MG/ML
4 INJECTION INTRAMUSCULAR; INTRAVENOUS ONCE AS NEEDED
Status: DISCONTINUED | OUTPATIENT
Start: 2021-01-01 | End: 2021-01-01 | Stop reason: HOSPADM

## 2021-01-01 RX ORDER — ACETAMINOPHEN 325 MG/1
650 TABLET ORAL EVERY 4 HOURS PRN
Status: CANCELLED | OUTPATIENT
Start: 2021-01-01

## 2021-01-01 RX ORDER — NITROGLYCERIN 0.4 MG/1
0.4 TABLET SUBLINGUAL
Status: DISCONTINUED | OUTPATIENT
Start: 2021-01-01 | End: 2021-01-01 | Stop reason: HOSPADM

## 2021-01-01 RX ORDER — MIDAZOLAM HYDROCHLORIDE 1 MG/ML
INJECTION INTRAMUSCULAR; INTRAVENOUS
Status: COMPLETED | OUTPATIENT
Start: 2021-01-01 | End: 2021-01-01

## 2021-01-01 RX ORDER — FENTANYL CITRATE 50 UG/ML
INJECTION, SOLUTION INTRAMUSCULAR; INTRAVENOUS
Status: COMPLETED | OUTPATIENT
Start: 2021-01-01 | End: 2021-01-01

## 2021-01-01 RX ORDER — ACETAMINOPHEN 325 MG/1
650 TABLET ORAL EVERY 4 HOURS PRN
Status: DISCONTINUED | OUTPATIENT
Start: 2021-01-01 | End: 2021-01-01 | Stop reason: HOSPADM

## 2021-01-01 RX ORDER — HYDROMORPHONE HYDROCHLORIDE 1 MG/ML
0.5 INJECTION, SOLUTION INTRAMUSCULAR; INTRAVENOUS; SUBCUTANEOUS
Status: DISCONTINUED | OUTPATIENT
Start: 2021-01-01 | End: 2021-01-01 | Stop reason: HOSPADM

## 2021-01-01 RX ORDER — PREDNISONE 10 MG/1
TABLET ORAL
Qty: 35 TABLET | Refills: 0 | Status: SHIPPED | OUTPATIENT
Start: 2021-01-01

## 2021-01-01 RX ORDER — SODIUM CHLORIDE 0.9 % (FLUSH) 0.9 %
1-10 SYRINGE (ML) INJECTION AS NEEDED
Status: DISCONTINUED | OUTPATIENT
Start: 2021-01-01 | End: 2021-01-01 | Stop reason: HOSPADM

## 2021-01-01 RX ORDER — CEFAZOLIN SODIUM 2 G/100ML
2 INJECTION, SOLUTION INTRAVENOUS ONCE
Status: COMPLETED | OUTPATIENT
Start: 2021-01-01 | End: 2021-01-01

## 2021-01-01 RX ADMIN — GLYCOPYRROLATE 0.4 MG: 0.4 INJECTION INTRAMUSCULAR; INTRAVENOUS at 13:50

## 2021-01-01 RX ADMIN — CEFAZOLIN SODIUM 2 G: 2 INJECTION, SOLUTION INTRAVENOUS at 13:24

## 2021-01-01 RX ADMIN — METOPROLOL SUCCINATE 100 MG: 100 TABLET, EXTENDED RELEASE ORAL at 08:20

## 2021-01-01 RX ADMIN — METHYLPREDNISOLONE SODIUM SUCCINATE 125 MG: 125 INJECTION, POWDER, FOR SOLUTION INTRAMUSCULAR; INTRAVENOUS at 13:28

## 2021-01-01 RX ADMIN — FENTANYL CITRATE 50 MCG: 50 INJECTION, SOLUTION INTRAMUSCULAR; INTRAVENOUS at 08:20

## 2021-01-01 RX ADMIN — PROPOFOL 125 MG: 10 INJECTION, EMULSION INTRAVENOUS at 12:54

## 2021-01-01 RX ADMIN — ROCURONIUM BROMIDE 40 MG: 10 INJECTION INTRAVENOUS at 12:54

## 2021-01-01 RX ADMIN — RIVAROXABAN 20 MG: 20 TABLET, FILM COATED ORAL at 20:13

## 2021-01-01 RX ADMIN — LABETALOL 20 MG/4 ML (5 MG/ML) INTRAVENOUS SYRINGE 5 MG: at 17:20

## 2021-01-01 RX ADMIN — FENTANYL CITRATE 50 MCG: 50 INJECTION, SOLUTION INTRAMUSCULAR; INTRAVENOUS at 16:45

## 2021-01-01 RX ADMIN — LIDOCAINE HYDROCHLORIDE 25 MG: 20 INJECTION, SOLUTION INFILTRATION; PERINEURAL at 12:54

## 2021-01-01 RX ADMIN — DEXAMETHASONE SODIUM PHOSPHATE 4 MG: 10 INJECTION INTRAMUSCULAR; INTRAVENOUS at 13:05

## 2021-01-01 RX ADMIN — ROCURONIUM BROMIDE 10 MG: 10 INJECTION INTRAVENOUS at 13:17

## 2021-01-01 RX ADMIN — ONDANSETRON 4 MG: 2 INJECTION INTRAMUSCULAR; INTRAVENOUS at 13:49

## 2021-01-01 RX ADMIN — ATORVASTATIN CALCIUM 40 MG: 40 TABLET, FILM COATED ORAL at 20:13

## 2021-01-01 RX ADMIN — FENTANYL CITRATE 50 MCG: 50 INJECTION, SOLUTION INTRAMUSCULAR; INTRAVENOUS at 16:15

## 2021-01-01 RX ADMIN — IOPAMIDOL 75 ML: 755 INJECTION, SOLUTION INTRAVENOUS at 20:27

## 2021-01-01 RX ADMIN — SODIUM CHLORIDE, PRESERVATIVE FREE 10 ML: 5 INJECTION INTRAVENOUS at 20:13

## 2021-01-01 RX ADMIN — MIDAZOLAM HYDROCHLORIDE 2 MG: 1 INJECTION, SOLUTION INTRAMUSCULAR; INTRAVENOUS at 08:18

## 2021-01-01 RX ADMIN — TRAZODONE HYDROCHLORIDE 50 MG: 50 TABLET ORAL at 21:18

## 2021-01-01 RX ADMIN — IPRATROPIUM BROMIDE AND ALBUTEROL SULFATE 3 ML: 2.5; .5 SOLUTION RESPIRATORY (INHALATION) at 17:53

## 2021-01-01 RX ADMIN — SODIUM CHLORIDE, PRESERVATIVE FREE 10 ML: 5 INJECTION INTRAVENOUS at 08:20

## 2021-01-01 RX ADMIN — FAMOTIDINE 20 MG: 20 TABLET, FILM COATED ORAL at 11:18

## 2021-01-01 RX ADMIN — FENTANYL CITRATE 50 MCG: 50 INJECTION, SOLUTION INTRAMUSCULAR; INTRAVENOUS at 08:19

## 2021-01-01 RX ADMIN — METHYLPREDNISOLONE SODIUM SUCCINATE 40 MG: 40 INJECTION, POWDER, FOR SOLUTION INTRAMUSCULAR; INTRAVENOUS at 17:58

## 2021-01-01 RX ADMIN — ASPIRIN 81 MG: 81 TABLET, COATED ORAL at 08:19

## 2021-01-01 RX ADMIN — NEOSTIGMINE METHYLSULFATE 4 MG: 1 INJECTION, SOLUTION INTRAVENOUS at 13:50

## 2021-01-01 RX ADMIN — IOPAMIDOL 85 ML: 755 INJECTION, SOLUTION INTRAVENOUS at 13:26

## 2021-01-01 RX ADMIN — NITROGLYCERIN 1 INCH: 20 OINTMENT TOPICAL at 17:54

## 2021-01-01 RX ADMIN — MIDAZOLAM HYDROCHLORIDE 2 MG: 1 INJECTION, SOLUTION INTRAMUSCULAR; INTRAVENOUS at 08:20

## 2021-01-01 RX ADMIN — SODIUM CHLORIDE: 9 INJECTION, SOLUTION INTRAVENOUS at 12:45

## 2021-01-15 ENCOUNTER — HOSPITAL ENCOUNTER (OUTPATIENT)
Age: 66
End: 2021-01-15
Payer: COMMERCIAL

## 2021-01-15 DIAGNOSIS — Z20.822: Primary | ICD-10-CM

## 2021-01-15 PROCEDURE — U0004 COV-19 TEST NON-CDC HGH THRU: HCPCS

## 2021-01-25 PROBLEM — I63.019 CEREBROVASCULAR ACCIDENT (CVA) DUE TO THROMBOSIS OF VERTEBRAL ARTERY (HCC): Status: ACTIVE | Noted: 2021-01-01

## 2021-02-01 NOTE — TELEPHONE ENCOUNTER
Lm for daughter to call me.  Checking on status of SDM visit with Dr Babin for upcoming Watchman implant     Sophia Griffin RN

## 2021-02-08 NOTE — NURSING NOTE
PRE-WATCHMAN HISTORY  Lynda Thorne 1955   [place] [address]   [phone] (home)     Referring MD: Otoniel Babin MD  Information obtained from: [x] Medical record review  [x] Patient report  Scheduled for: Watchman implant on 2/9/2021 with Dr Brittney PEACE Visit:    History & Risk Factors (prior to Watchman implant)  [id number] Risk Factors:  Congestive HF     [x] No   [] Yes  LV Dysfunction   [] No   [x] Yes  Hypertension      [] No   [x] Yes  Diabetes    [x] No   [] Yes  Stroke       [x] No   [] Yes  TIA       [x] No   [] Yes  Thromboembolism    [x] No   [] Yes  Vascular Disease   [x] No   [] Yes    HAS-BLED Risk Factors:  HTN (uncontrolled) [x] No  [] Yes  Abnormal Renal Fxn  [x] No  [] Yes  Abnormal Liver Fxn   [x] No  [] Yes  Stroke            [] No  [x] Yes  Bleeding event  [] No  [] Yes  Labile INR      [x] No  [] Yes  Alcohol              [] No  [x] Yes  Antiplatelets      [x] No  [] Yes  NSAIDS  [x] No  [] Yes    Additional Stroke & Bleeding Risk Factors:  Increased Fall Risk   [x] No  [] Yes  Bleeding Event         [] No  [] Yes      If Yes, Type      [] Intracranial [] Epistaxis   [] GI   [x] failed Eliquis therapy with CVA    Rhythm History:  AFIBTYPE: paroxysmal    Valvular AFib        [x] No  [] Yes    Attempt at AFib Termination:  [x] No  [] Yes    Atrial Flutter    [x] No  [] Yes     JUSTINE Intervention    [x] No  [] Yes    Additional History & Risk Factors:  Cardiomyopathy   [x] No  [] Yes  Chronic Lung Disease  [] No  [x] Yes Asthma /COPD  Coronary Artery Disease  [x] No  [] Yes  Sleep Apnea    [x] No  [] Yes       If Yes, compliant with treatment [] No  [] Yes    Epicardial Approach Considered [x] No  [] Yes    Diagnostic Studies:  Last Echo:  [x] TTE Date: 12/19/20                EF: 60%            LA: 4.1 cm              Pre-procedure blood thinner medications:  Xarelto 20 mg, ASA 81 mg     Procedure information:     JUSTINE Orifice Maximal Width: 28.9  mm  Cumulative  Air Kerma:  348 mGy  Contrast Volume:  90 mL  Dose Area Product:  1733.46 ?Gy-M2     02/08/21 09:06 EST   Sophia Griffin RN

## 2021-02-09 NOTE — ANESTHESIA PROCEDURE NOTES
Airway  Urgency: elective    Date/Time: 2/9/2021 12:56 PM  Airway not difficult    General Information and Staff    Patient location during procedure: OR    Indications and Patient Condition  Indications for airway management: airway protection    Preoxygenated: yes  Mask difficulty assessment: 0 - not attempted    Final Airway Details  Final airway type: endotracheal airway      Successful airway: ETT  Cuffed: yes   Successful intubation technique: direct laryngoscopy  Facilitating devices/methods: intubating stylet  Endotracheal tube insertion site: oral  Blade: Moeller  Blade size: 2  ETT size (mm): 7.0  Cormack-Lehane Classification: grade I - full view of glottis  Placement verified by: chest auscultation and capnometry   Measured from: lips  ETT/EBT  to lips (cm): 21  Number of attempts at approach: 1  Assessment: lips, teeth, and gum same as pre-op and atraumatic intubation

## 2021-02-09 NOTE — H&P
Cardiac Electrophysiology History and Physical          [facility]    History & Physical      PATIENT NAME  Lynda Thorne    :  1955 AGE: 65 y.o.    ADMISSION DATE: 2021     Subjective      Problem List:       1. Permanent Atrial Fibrillation   a. CHADsVASc = 5  b. Insufficient database for hx of AFib  c. Maintained on Eliquis and Lopressor  d. Presented to [facility] ED 2020 with CVA  e. Patient transitioned to Xarelto due to failed Eliquis therapy with CVA.  f. Rate control continued with metoprolol and Cardizem therapies  g. Echo 2020 EF 56-60%, no significant VHD, LA 4.1 cm  2. Essential Hypertension  3. Cerebrovascular accident  / CVA  a. Presented to ED via EMS 2020 with right hemiparesis and expressive aphasia. Patient ruled out for TPA and taken for emergent thrombectomy.    b. Patient transitioned to Xarelto due to failed Eliquis therapy  4. Anxiety /  Depression  5. Asthma       HISTORY OF PRESENT ILLNESS:  Mrs Lynda Thorne presents today for elective watchman left atrial appendage closure device placement.  Patient noted with history of persistent atrial fibrillation with CVA May 2020 while on therapeutic doses of Eliquis.  Patient was subsequently transitioned to Xarelto therapy.  She was felt to have an acceptable reason to pursue nonpharmacological alternative to long-term anticoagulation therapy and offered watchman device placement for which she presents for today.  Upon evaluation patient reports doing well from an atrial fibrillation standpoint with continued rate control on Cardizem and metoprolol therapy.  On assessment patient is continued in atrial fibrillation  bpm.  Patient denies chest pain, palpitations, dizziness, presyncope, or syncope.  Patient admits compliance to Xarelto therapy without noted side effects, signs of bleeding, or recurrent TIA/strokelike symptoms.  Patient denies recent infection or signs of  fever, chills, sweats, or cough.  Patient denies recent sick contacts.        PAST MEDICAL HISTORY  Past Medical History:   Diagnosis Date   • Arthritis    • Asthma    • Depression with anxiety    • Hypertension    • Stroke (CMS/HCC)        SURGICAL HISTORY   has a past surgical history that includes Interventional radiology procedure (Bilateral, 2020).     SOCIAL HISTORY  Social History     Socioeconomic History   • Marital status:      Spouse name: Not on file   • Number of children: Not on file   • Years of education: Not on file   • Highest education level: Not on file   Tobacco Use   • Smoking status: Former Smoker     Quit date: 2020     Years since quittin.7   • Smokeless tobacco: Never Used   Substance and Sexual Activity   • Alcohol use: Yes     Frequency: Never     Comment: Rare   • Drug use: Defer   • Sexual activity: Defer       FAMILY HISTORY  family history includes Diabetes in her mother; Hypertension in her father.     MEDICATIONS  Prior to Admission medications    Medication Sig Start Date End Date Taking? Authorizing Provider   albuterol sulfate  (90 Base) MCG/ACT inhaler Inhale 2 puffs Every 4 (Four) Hours As Needed for Wheezing. 20   Jordy Willis,    aspirin 81 MG EC tablet Take 81 mg by mouth Daily.    Pat Jim MD   atorvastatin (LIPITOR) 40 MG tablet Take 1 tablet by mouth Every Night. 20   Jordy Griffin MD   busPIRone (BUSPAR) 10 MG tablet Take 10 mg by mouth 2 (Two) Times a Day.    Pat Jim MD   cefdinir (OMNICEF) 300 MG capsule Take 1 capsule by mouth 2 (Two) Times a Day. 20   Connor Anderson MD   dilTIAZem CD (CARDIZEM CD) 360 MG 24 hr capsule Take 1 capsule by mouth Daily. 20   Jordy Griffin MD   fluocinonide (LIDEX) 0.05 % external solution APPLY TWICE WEEKLY AS DIRECTED 20   Pat Jim MD   furosemide (LASIX) 40 MG tablet Take 40 mg by mouth Daily. 20   Diandra  MD Pat   ipratropium-albuterol (DUO-NEB) 0.5-2.5 mg/3 ml nebulizer Take 3 mL by nebulization 4 (Four) Times a Day. 6/27/20   Jordy Griffin MD   lisinopril (PRINIVIL,ZESTRIL) 5 MG tablet Take 1 tablet by mouth Daily. 6/28/20   Jordy Griffin MD   Magnesium 250 MG tablet Take 250 mg by mouth Daily.    Provider, MD Pat   metoprolol succinate XL (TOPROL-XL) 100 MG 24 hr tablet Take 1 tablet by mouth Daily. 6/28/20   Jordy Griffin MD   rivaroxaban (XARELTO) 20 MG tablet Take 1 tablet by mouth Daily With Dinner. Indications: Atrial Fibrillation 5/30/20   Tess Huynh MD   sennosides-docusate (PERICOLACE) 8.6-50 MG per tablet Take 2 tablets by mouth 2 (Two) Times a Day. 5/30/20   Tess Huynh MD   tiotropium bromide-olodaterol (STIOLTO RESPIMAT) 2.5-2.5 MCG/ACT aerosol solution inhaler Inhale 2 puffs Daily. 2 inh once a day 11/20/20   Jordy Willis DO   traZODone (DESYREL) 50 MG tablet Take 50 mg by mouth At Night As Needed for Sleep.    Provider, MD Pat   venlafaxine XR (EFFEXOR-XR) 150 MG 24 hr capsule Take 150 mg by mouth Daily.    Provider, MD Pat       ALLERGIES  Allergies   Allergen Reactions   • Msg [Monosodium Glutamate] Shortness Of Breath and Arrhythmia     Reported by pt's son       REVIEW OF SYSTEMS  Constitutional: No fevers or chills, no recent weight gain or weight loss or fatigue  Eyes: No visual loss, blurred vision, double vision, yellow sclerae.  ENT: No headaches, hearing loss, vertigo, congestion or sore throat.   Cardiovascular: Per HPI  Respiratory: No cough or wheezing, no sputum production, no hematemesis   Gastrointestinal: No abdominal pain, no nausea, vomiting, constipation, diarrhea, melena.   Genitourinary: No dysuria, hematuria or increased frequency.  Musculoskeletal:  No gait disturbance, weakness or joint pain or stiffness  Integumentary: No rashes, urticaria, ulcers or sores.   Neurological: No  "headache, dizziness, syncope, paralysis, ataxia  Psychiatric: No anxiety, or depression  Endocrine: No diaphoresis, cold or heat intolerance. No polyuria or polydipsia.   Hematologic/Lymphatic: No anemia, abnormal bruising or bleeding      Objective      PHYSICAL EXAM    Vital Signs: /88 mmHg, HR 96 bpm, SPO2 94% RA.    General appearance: Awake, alert, cooperative  Head: Normocephalic, without obvious abnormality, atraumatic  Eyes: Conjunctivae/corneas clear, EOM's intact  Neck: no adenopathy, no carotid bruit, no JVD and thyroid: not enlarged  Lungs: clear to auscultation bilaterally and no rhonchi or crackles\", ' symmetric  Heart: irregular rate and rhythm, S1, S2 normal, no murmur, click, rub or gallop  Abdomen: Soft, non-tender. Bowel sounds normal,  no organomegaly  Extremities: extremities normal, atraumatic, no cyanosis or edema  Skin: Skin color, turgor normal, no rashes or lesions  Neurologic: Grossly normal       CBC:   WBC   Date Value Ref Range Status   02/07/2021 8.45 3.40 - 10.80 10*3/mm3 Final     RBC   Date Value Ref Range Status   02/07/2021 4.32 3.77 - 5.28 10*6/mm3 Final     Hemoglobin   Date Value Ref Range Status   02/07/2021 13.9 12.0 - 15.9 g/dL Final     Hematocrit   Date Value Ref Range Status   02/07/2021 41.3 34.0 - 46.6 % Final     MCV   Date Value Ref Range Status   02/07/2021 95.6 79.0 - 97.0 fL Final     RDW   Date Value Ref Range Status   02/07/2021 12.3 12.3 - 15.4 % Final     Platelets   Date Value Ref Range Status   02/07/2021 276 140 - 450 10*3/mm3 Final     Lab Results   Component Value Date    GLUCOSE 92 12/19/2020    CALCIUM 8.8 12/19/2020     12/19/2020    K 3.9 12/19/2020    CO2 26.0 12/19/2020     12/19/2020    BUN 14 12/19/2020    CREATININE 0.58 12/19/2020    EGFRIFNONA 104 12/19/2020    BCR 24.1 12/19/2020    ANIONGAP 7.0 12/19/2020       Todays BMP Pending review prior to procedure    BMP:      Invalid input(s): SODIUM, PHOSPHORUS    TELEMETRY: Atrial " fibrillation  bpm    Assessment & Plan     Mrs Lynda Thorne presents today for elective watchman left atrial appendage closure device placement.  Patient noted with history of persistent atrial fibrillation with CVA May 2020 while on therapeutic doses of Eliquis.  Patient was subsequently transitioned to Xarelto therapy.  She was felt to have an acceptable reason to pursue nonpharmacological alternative to long-term anticoagulation therapy and offered watchman device placement for which she presents for today.  Patient felt to be an appropriate candidate for short-term anticoagulation but at increased risk of complication on long-term anticoagulation.  A shared decision making encounter with a noninterventional physician occurred in his document within chart.  All risk, benefits, and alternatives to the procedure were outlined reviewed with patient in clinic and again here at bedside today.  Patient verbalizes complete understanding of the procedure and is willing to proceed as scheduled.  All preprocedure lab evaluation reviewed and acceptable.  Patient has a negative Covid screening document within the last 72 hours.       Electronically signed by JAVY Meléndez, 02/09/21, 10:09 AM EST.      This note was completed using a voice transcription system. Every effort was made to ensure accuracy. However, inadvertent computerized transcription errors may be present.

## 2021-02-09 NOTE — ANESTHESIA POSTPROCEDURE EVALUATION
Patient: Lynda Thorne    Procedure Summary     Date: 02/09/21 Room / Location: [facility]/EP LAB [facility] / [facility] INVASIVE LOCATION    Anesthesia Start: 1248 Anesthesia Stop:     Procedure: Atrial Appendage Occlusion (Watchman), hold Xarelto 1 dose (N/A ) Diagnosis:       Paroxysmal atrial fibrillation (CMS/HCC)      Cerebrovascular accident (CVA) due to thrombosis of vertebral artery, unspecified blood vessel laterality (CMS/HCC)      Elevated LFTs      (afib)    Provider: Earnest Lugo DO Provider: Billy Shankar MD    Anesthesia Type: general ASA Status: 4          Anesthesia Type: general    Vitals  No vitals data found for the desired time range.          Post Anesthesia Care and Evaluation    Patient location during evaluation: PACU  Patient participation: complete - patient participated  Level of consciousness: awake and alert  Pain management: adequate  Airway patency: patent  Anesthetic complications: No anesthetic complications  PONV Status: none  Cardiovascular status: hemodynamically stable and acceptable  Respiratory status: nonlabored ventilation, acceptable and nasal cannula  Hydration status: acceptable

## 2021-02-09 NOTE — ANESTHESIA PREPROCEDURE EVALUATION
Anesthesia Evaluation     Patient summary reviewed and Nursing notes reviewed   no history of anesthetic complications:  NPO Solid Status: > 8 hours  NPO Liquid Status: > 8 hours           Airway   Mallampati: II  TM distance: >3 FB  Neck ROM: full  No difficulty expected  Dental    (+) upper dentures    Pulmonary    (+) a smoker (quit x 9 months), COPD (h/o home O2 use, not currently using home O2) severe, asthma,shortness of breath, rhonchi, wheezes,   Cardiovascular - normal exam    ECG reviewed  Patient on routine beta blocker  Rhythm: regular  Rate: normal    (+) hypertension, dysrhythmias,     ROS comment: 2/9/21- a fib  12/9/20 - tte- ·lvef 56 - 60%. diastolic dysfunction is noted.  Mild cLVH, negative bubble.     Neuro/Psych  (+) CVA (vertebral artery cva with dysarthria; s/p mechanical thrombectomy 5/23/20; residual left arm weakness) residual symptoms, weakness, psychiatric history,     GI/Hepatic/Renal/Endo      Musculoskeletal     Abdominal    Substance History   (+) alcohol use,   (-) drug use     OB/GYN          Other   arthritis,      ROS/Med Hx Other: hgb 13.9 k 3.9  Cr 0.58                Anesthesia Plan    ASA 4     general   (Pt has poor pulmonary status.  It is best to intubate given the amount of airway instrumentation and possibility for respiratory decline with an unprotected airway and a general anesthetic.)  intravenous induction     Anesthetic plan, all risks, benefits, and alternatives have been provided, discussed and informed consent has been obtained with: patient.    Plan discussed with CRNA.

## 2021-02-09 NOTE — OP NOTE
Cardiac Electrophysiology Procedure Note          [facility] at [facility]                LEFT ATRIAL APPENDAGE CLOSURE (LAAC)                                 Watchman Implant    PROCEDURES PERFORMED:    Left atrial appendage closure with 35 Watchman FLX device.  Patient was admitted to the hospital for this procedure.  This is an inpatient procedure.    PREPROCEDURAL DIAGNOSES:    1. Paroxysmal Atrial fibrillation  2. [id number] score of 6  3. HASBLED score of 3    REFERRING PHYSICIAN:    Trey Hammond MD    POST PROCEDURE DIANGOSES:  As above.    INDICATION FOR PROCEDURE:  Briefly, Lynda Thorne is a 65 y.o. year old female with a history of failure of eliquis resulting in stroke.    ANTICOAGULATION STRATEGY PRIOR TO AND POST PROCEDURE:  xarelto 20 mg po qhs.  The last dose of anticoagulant was confirmed to have been taken last night.    PT/INR:  No results found for: LABPROT, INR  PTT:  No results found for: APTT    CBC:   WBC   Date Value Ref Range Status   02/07/2021 8.45 3.40 - 10.80 10*3/mm3 Final     RBC   Date Value Ref Range Status   02/07/2021 4.32 3.77 - 5.28 10*6/mm3 Final     Hemoglobin   Date Value Ref Range Status   02/07/2021 13.9 12.0 - 15.9 g/dL Final     Hematocrit   Date Value Ref Range Status   02/07/2021 41.3 34.0 - 46.6 % Final     MCV   Date Value Ref Range Status   02/07/2021 95.6 79.0 - 97.0 fL Final     RDW   Date Value Ref Range Status   02/07/2021 12.3 12.3 - 15.4 % Final     Platelets   Date Value Ref Range Status   02/07/2021 276 140 - 450 10*3/mm3 Final     BMP:     Sodium   Date Value Ref Range Status   02/09/2021 140 136 - 145 mmol/L Final     Potassium   Date Value Ref Range Status   02/09/2021 3.9 3.5 - 5.2 mmol/L Final     Chloride   Date Value Ref Range Status   02/09/2021 103 98 - 107 mmol/L Final     CO2   Date Value Ref Range Status   02/09/2021 30.0 (H) 22.0 - 29.0 mmol/L Final     BUN   Date Value Ref Range Status   02/09/2021 18 8 - 23  "mg/dL Final     Creatinine   Date Value Ref Range Status   02/09/2021 0.87 0.57 - 1.00 mg/dL Final       Vital Signs: /88 (BP Location: Left arm, Patient Position: Lying) Comment: no bp right  Pulse 103   Temp 97.2 °F (36.2 °C) (Temporal)   Resp 20   Ht 167.6 cm (66\")   Wt 78.2 kg (172 lb 6.4 oz)   LMP  (LMP Unknown)   SpO2 94%   BMI 27.83 kg/m²      Admit Weight:  78.2 kg (172 lb 6.4 oz)  BMI: Body mass index is 27.83 kg/m².    General anesthesia was selected for this patient due to lung disease    PROCEDURE NARRATIVE:    The patient was able to give written informed consent after revisiting the key portions of the risk versus benefit profile of the procedure.  This discussion was framed by our lengthy conversations  (please see our detailed notes).  Patient verbalized strong understanding of this discussion and a strong desire to proceed with the procedure.  Please note that this detailed informed consent process utilized mutual and shared decision making process between all parties involved, principally the physician and patient, but also potentially with input from the patient's selected family and friends.    Please also note that the patient was seen and examined prior to this procedure by a non implanting physician who agreed that this patient would benefit from left atrial appendage closure as an alternative to long-term anticoagulation.  Please see this physician separate attestation.    The patient was brought to the EP Lab in the post absorptive state.   A SAUL probe was placed by my cardiology colleague physician.  SAUL was used continuously throughout the procedure for monitoring of the pericardial space, trans-septal puncture and also measurement of left atrial appendage and ultimately delivery of the left atrial appendage occlusion device.  Please see the separate and detailed SAUL report.     The patient was then prepared and draped in a routing sterile fashion.  Seldinger access was " obtained at the right common femoral vein with a single venipuncture utilizing ultrasound probe guided vascular access.  A J tip wire was advanced into the vascular space.  A 16 Guyanese long sheath was placed into the inferior vena cava.  The patient was anticoagulated with unfractionated heparin in bolus and then continuous infusion to maintain an ACT of between 200 and 300 seconds. An SL0 sheath and a BRK needle along with safe Sept septal were used to perform transseptal puncture with a combination of fluoroscopic and echocardiographic guidance.    The Watchman delivery sheath was then placed in the left atrium under echocardiographic and fluoroscopic guidance safely.  Left atrial appendage was then cannulated with a 10 mm angled pigtail catheter.  This was again performed with a combination of echocardiographic and fluoroscopic guidance.  Contrast injection was also performed.  The double curve Watchman delivery sheath was then placed into optimal position within left atrial appendage.  The left atrial appendage diameter was viewed in multiple projections on echocardiography as well as fluoroscopy with contrast injection into the left atrial appendage.  A 35 mm device was selected and delivered with the supplied delivery tools to the left atrial appendage safely.      PASS criteria were then evaluated and confirmed.      Compression of the device varied 18 % and  20 %    The device was then safely released.    Catheters and sheaths were withdrawn from the left atrium and then the body.  Hemostasis at the site of venous access was achieved after withdrawing the sheath from the body with a figure-of-eight stitch and manual pressure.  Transesophageal echocardiogram demonstrated no pericardial effusion.    Protamine was given to reverse anticoagulation.    The patient was transferred to recovery in stable condition.    COMPLICATIONS: none    EBL: minimal    RADIATION EXPOSURE: 348 mGy over 5.8 minutes    LA PRESSURE:  prior to placement of the left atrial appendage closure device.: 25 / 10 mmHg, with a mean of  16 mmHg    KEY PROCEDURAL FINDINGS:    ·  Successful 35 mm Watchman FLX implantation with all trabeculae covered and no nehemiah-device leak.    POST PROCEDURAL PLAN:    · Continue lifelong anticoagulation xarelto 20 mg po QHS as well as concomitant aspirin 81 mg daily.  · At 45 days a transesophageal echo will be obtained to assess for endothelialization of the left atrial appendage occlusion device.   · Anticipate discharge tomorrow.  Please note that this patient was admitted specifically for this elective procedure.  This is an inpatient procedure.  · The patient will be seen at our office per protocol for this procedure.  A 45 day post implant SAUL will be scheduled.    Earnest Lugo DO, FACC, [facility]  Cardiac Electrophysiologist  [facility] / [facility]

## 2021-02-10 NOTE — PROGRESS NOTES
"      Cardiac Electrophysiology Inpatient Follow Up Note         [facility]    Progress Note      CC: Permanent Atrial Fibrillation    Problem List:       1. Permanent Atrial Fibrillation   a. CHADsVASc = 5  b. Insufficient database for hx of AFib  c. Maintained on Eliquis and Lopressor  d. Presented to [facility] ED 2020 with CVA  e. Patient transitioned to Xarelto due to failed Eliquis therapy with CVA.  f. Rate control continued with metoprolol and Cardizem therapies  g. Echo 2020 EF 56-60%, no significant VHD, LA 4.1 cm  2. Essential Hypertension  3. Cerebrovascular accident  / CVA  a. Presented to ED via EMS 2020 with right hemiparesis and expressive aphasia. Patient ruled out for TPA and taken for emergent thrombectomy.    b. Patient transitioned to Xarelto due to failed Eliquis therapy  4. Severe Emphysema /COPD  5. Blood clotting disorder  6. Anxiety /  Depression      Subjective     Mrs Lynda Thorne  is a 65-year-old female seen in EP follow-up today status post watchman left atrial appendage closure device placement yesterday.  Upon evaluation today patient is resting comfortably at bedside in recliner finishing breakfast.  Patient denies complaints.  Patient's right groin sutures removed without hematoma, ecchymosis, or drainage noted.  Patient has been up ambulating in room and voiding without difficulty.  Patient states she is ready for discharge home today if possible.    REVIEW OF SYSTEMS  ROS no change from admission H&P except for: above    Objective   VITAL SIGNS  /78 (BP Location: Left arm, Patient Position: Lying)   Pulse 102   Temp 97.6 °F (36.4 °C) (Oral)   Resp 18   Ht 167.6 cm (66\")   Wt 78.2 kg (172 lb 6.4 oz)   LMP  (LMP Unknown)   SpO2 91%   BMI 27.83 kg/m²     Pulse Ox: SpO2  Av.7 %  Min: 86 %  Max: 98 %  Supplemental O2:       Admit Weight  Weight: 78.2 kg (172 lb 6.4 oz)  Last 3 Weights    Body mass index is 27.83 " kg/m².    INTAKE/OUTPUT  I/O last 3 completed shifts:  In: 1200 [P.O.:700; I.V.:500]  Out: 200 [Urine:200]    Intake/Output Summary (Last 24 hours) at 2/10/2021 1033  Last data filed at 2/10/2021 0100  Gross per 24 hour   Intake 1200 ml   Output 200 ml   Net 1000 ml       PHYSICAL EXAM  General appearance: awake, alert, oriented, moves all extremities  Lungs: no rhonchi, no wheezes, no rales  Heart: Irregular S1-S2  Abdomen: positive bowel sounds, no bruits, no masses  Extremities: warm and dry, no cyanosis, no clubbing    LABS  Results from last 7 days   Lab Units 02/07/21  1412   WBC 10*3/mm3 8.45   HEMOGLOBIN g/dL 13.9   HEMATOCRIT % 41.3   MCV fL 95.6   PLATELETS 10*3/mm3 276         Results from last 7 days   Lab Units 02/09/21  1041 02/07/21  1320   POTASSIUM mmol/L 3.9  --    CHLORIDE mmol/L 103  --    CO2 mmol/L 30.0*  --    BUN mg/dL 18  --    CREATININE mg/dL 0.87 1.20   GLUCOSE mg/dL 155*  --    CALCIUM mg/dL 9.3  --        CURRENT MEDICATIONS  aspirin, 81 mg, Oral, Daily  atorvastatin, 40 mg, Oral, Nightly  famotidine, 20 mg, Intravenous, Once  metoprolol succinate XL, 100 mg, Oral, Q24H  rivaroxaban, 20 mg, Oral, Daily With Dinner  sodium chloride, 10 mL, Intravenous, Q12H  traZODone, 50 mg, Oral, Nightly      TELEMETRY: Atrial fibrillation  bpm     Assessment & Plan      Mrs Lynda Thorne  is a 65-year-old female seen in EP follow-up today status post watchman left atrial appendage closure device placement yesterday.  Patient is stable and ready for discharge home today with follow-up in 45 days for SAUL and in 3 months with Dr. Lugo in clinic.  Patient is instructed on all activity restrictions and wound care instructions.  Patient's medications are reviewed and outlined on discharge paperwork.  Patient verbalizes complete understanding above instruction education and is ready for discharge home today.  Patient noted to require lifelong anticoagulation with Xarelto 20 mg / Aspirin 81 mg daily  due to history of CVA on Eliquis therapy and clotting disorder with watchman for added stroke prevention in the event that she would have to interrupt anticoagulation therapy for procedures or complications in the future.    .    Electronically signed by JAVY Meléndez, 02/10/21, 10:39 AM EST.

## 2021-02-11 NOTE — OUTREACH NOTE
Prep Survey      Responses   [facility] facility patient discharged from?  [place]   Is LACE score < 7 ?  No   Emergency Room discharge w/ pulse ox?  No   Eligibility  Readm Mgmt   Discharge diagnosis  watchman left atrial appendage closure device placement   Does the patient have one of the following disease processes/diagnoses(primary or secondary)?  General Surgery   Does the patient have Home health ordered?  No   Is there a DME ordered?  No   Comments regarding appointments  see AVS   General alerts for this patient  HX CVA/TIA   Prep survey completed?  Yes          Galina Choi RN

## 2021-02-12 NOTE — OUTREACH NOTE
General Surgery Week 1 Survey      Responses   [facility] patient discharged from?  [place]   Does the patient have one of the following disease processes/diagnoses(primary or secondary)?  General Surgery   Week 1 attempt successful?  Yes   Call start time  1610   Call end time  1613   General alerts for this patient  HX CVA/TIA   Discharge diagnosis  watchman left atrial appendage closure device placement   Person spoke with today (if not patient) and relationship  Elvin-joyce    Meds reviewed with patient/caregiver?  Yes   Is the patient having any side effects they believe may be caused by any medication additions or changes?  No   Does the patient have all medications related to this admission filled (includes all antibiotics, pain medications, etc.)  N/A   Is the patient taking all medications as directed (includes completed medication regime)?  Yes   Does the patient have a follow up appointment scheduled with their surgeon?  Yes [5/12/21]   Has the patient kept scheduled appointments due by today?  N/A   Comments  Appt with PCP is on 2/16/21   Psychosocial issues?  No   Did the patient receive a copy of their discharge instructions?  Yes   Nursing interventions  Reviewed instructions with patient   What is the patient's perception of their health status since discharge?  Same   Nursing interventions  Nurse provided patient education   Is the patient /caregiver able to teach back basic post-op care?  Lifting as instructed by MD in discharge instructions   Is the patient/caregiver able to teach back signs and symptoms of incisional infection?  Fever, Increased drainage or bleeding   Is the patient/caregiver able to teach back steps to recovery at home?  Rest and rebuild strength, gradually increase activity   Is the patient/caregiver able to teach back the hierarchy of who to call/visit for symptoms/problems? PCP, Specialist, Home health nurse, Urgent Care, ED, 911  Yes [Pt has a Life Alert that she wears  around her neck]   Week 1 call completed?  Yes          Shiela Styles RN

## 2021-02-21 NOTE — OUTREACH NOTE
General Surgery Week 2 Survey      Responses   [facility] patient discharged from?  [place]   Does the patient have one of the following disease processes/diagnoses(primary or secondary)?  General Surgery   Week 2 attempt successful?  Yes   Call start time  1122   Call end time  1127   General alerts for this patient  HX CVA/TIA   Discharge diagnosis  watchman left atrial appendage closure device placement   Is patient permission given to speak with other caregiver?  Yes   Person spoke with today (if not patient) and relationship  Elvin-son    Meds reviewed with patient/caregiver?  Yes   Is the patient having any side effects they believe may be caused by any medication additions or changes?  No   Does the patient have all medications related to this admission filled (includes all antibiotics, pain medications, etc.)  N/A   Is the patient taking all medications as directed (includes completed medication regime)?  Yes   Does the patient have a follow up appointment scheduled with their surgeon?  Yes   Has the patient kept scheduled appointments due by today?  N/A   Comments  Weather was too bad, encouraged visit this week.   Has home health visited the patient within 72 hours of discharge?  N/A   Psychosocial issues?  No   Did the patient receive a copy of their discharge instructions?  Yes   Nursing interventions  Reviewed instructions with patient   What is the patient's perception of their health status since discharge?  Improving   Nursing interventions  Nurse provided patient education   Is the patient /caregiver able to teach back basic post-op care?  Continue use of incentive spirometry at least 1 week post discharge, Practice 'cough and deep breath', Keep incision areas clean,dry and protected, Lifting as instructed by MD in discharge instructions   Is the patient/caregiver able to teach back signs and symptoms of incisional infection?  Increased redness, swelling or pain at the incisonal site,  Increased drainage or bleeding, Incisional warmth, Pus or odor from incision, Fever   Is the patient/caregiver able to teach back steps to recovery at home?  Set small, achievable goals for return to baseline health, Rest and rebuild strength, gradually increase activity, Eat a well-balance diet, Make a list of questions for surgeon's appointment   If the patient is a current smoker, are they able to teach back resources for cessation?  Not a smoker   Is the patient/caregiver able to teach back the hierarchy of who to call/visit for symptoms/problems? PCP, Specialist, Home health nurse, Urgent Care, ED, 911  Yes   Additional teach back comments  Says she is improving, doing well. Encouraged f/u appt with someone in next 2 weeks.   Week 2 call completed?  Yes   Wrap up additional comments  Improving.          Taylor Holliday RN

## 2021-03-18 NOTE — TELEPHONE ENCOUNTER
Pt advised Bree will be calling with an appt for April by first of the month.  Pt will call me with any questions in the interim.   Sophia Griffin RN    ----- Message from Arpit Sewell sent at 3/18/2021 12:13 PM EDT -----  She did.  But now I do not have anything open  ----- Message -----  From: Sophia Griffin RN  Sent: 3/18/2021  11:48 AM EDT  To: Arpit Sewell    hey said they did not request April?  Can you call them please   ----- Message -----  From: Bree Rivera RegSched Rep  Sent: 3/18/2021  10:29 AM EDT  To: Sophia Griffin RN    Yes she stated she wanted April.  We will not get that schedule until the last week of the month.  ----- Message -----  From: Sophia Griffin RN  Sent: 3/18/2021  10:23 AM EDT  To: Arpit Sewell    Son phoned this morning and states they have not heard back on scheduling 45 Watchman SAUL.  Can you help me with this please?

## 2021-04-10 ENCOUNTER — HOSPITAL ENCOUNTER (OUTPATIENT)
Age: 66
End: 2021-04-10
Payer: MEDICARE

## 2021-04-10 DIAGNOSIS — Z20.822: Primary | ICD-10-CM

## 2021-04-10 PROCEDURE — U0003 INFECTIOUS AGENT DETECTION BY NUCLEIC ACID (DNA OR RNA); SEVERE ACUTE RESPIRATORY SYNDROME CORONAVIRUS 2 (SARS-COV-2) (CORONAVIRUS DISEASE [COVID-19]), AMPLIFIED PROBE TECHNIQUE, MAKING USE OF HIGH THROUGHPUT TECHNOLOGIES AS DESCRIBED BY CMS-2020-01-R: HCPCS

## 2021-04-13 NOTE — PROGRESS NOTES
1. SAUL today revealed well-positioned left atrial appendage closure device.  No leak  2. Increased tricuspid regurgitation with [id number] elevated RVSP.  Ongoing dyspnea.  3. Recommended furosemide 40 mg daily for 5 days and then going to every other day.  Potassium 20 mEq with each 40 mg furosemide dose  4. Move up appointment with HERIBERTO Jane to sometime in the next week or 2.  5. Recommend BMP and proBNP on that day  6. Also patient hopes to undergo dental procedure that would warrant holding Xarelto.  Message sent to Dr. Lugo to discuss    Mateo Weir MD, MSc, FACC, [facility]  Interventional Cardiology  [facility]

## 2021-04-13 NOTE — TELEPHONE ENCOUNTER
----- Message from Earnest Lugo DO sent at 4/13/2021  9:10 AM EDT -----  Melanie,    My strong preference is that this patient have her dental procedure without stopping xarelto.    Would you be so kind as to communicate this to the patient and / or dentist.  If they leave her on Xarelto, she will be low risk.    ----- Message -----  From: Mateo Weir MD  Sent: 4/13/2021   8:58 AM EDT  To: DO Earnest Lee,    This patient had a stroke on Eliquis.  Switched to Xarelto.  Watchman placed by her self.  Plan was to continue Xarelto.  45-day SAUL today looked great.  She needs a dental procedure due to ongoing tooth pain and some difficulty with certain foods.  Would need Xarelto held.  Since we are not switching her to clopidogrel due to having a stroke on Eliquis, what are your thoughts on holding Xarelto now for the procedure?  If you are okay with it, can you have your nurse reach out to the patient and let her know she can proceed?      Thanks    -Elie

## 2021-04-13 NOTE — H&P
[facility]   [address], [address]  [place], [place], [address]    [phone]  WWW.[facility]           HISTORY & PHYSICAL NOTE    Patient Care Team:  Patient Care Team:  Otoniel Babin MD as PCP - General (Family Medicine)      Chief complaint: Atrial fibrillation         HPI:    Lynda Thorne is a 65 y.o. female.    Partial problem list, including cardiac problems:  1. Permanent Atrial Fibrillation   a. CHADsVASc = 6  b. Insufficient database for hx of AFib  c. Presented to [facility] ED 5/23/2020 with CVA  d. Patient transitioned to Xarelto due to failed Eliquis therapy with CVA.  e. Rate control continued with metoprolol and Cardizem therapies  f. Echo 12/19/2020 EF 56-60%, no significant VHD, LA 4.1 cm  2. Essential Hypertension  3. Cerebrovascular accident  / CVA  a. Presented to ED via EMS 5/23/2020 with right hemiparesis and expressive aphasia. Patient ruled out for TPA and taken for emergent thrombectomy.    b. Patient transitioned to Xarelto due to failed Eliquis therapy  4. Anxiety /  Depression  5. Asthma     The patient presents today for transesophageal echocardiogram 45 days after watchman placement.  Since having her watchman placed she is had mild, intermittent chest pain.  She describes this as a brief sharp sensation.  She also reports dyspnea.  She denies lower extremity edema, lightheadedness, or syncope.  She is in rate controlled atrial fibrillation today.  She has been compliant with his Xarelto.  She does note in the morning upon waking that she at times has blood in her mouth.  She believes this is drainage from her nose.    Review of Systems:  Positive for atypical chest pain, dyspnea, possible epistaxis.  All other systems reviewed and are negative.    PFSH:  Patient Active Problem List   Diagnosis   • Essential hypertension   • Paroxysmal atrial fibrillation (CMS/HCC)   • Recent MCA CVA while on Eliquis, s/p mechanical thrombectomy  5/23/20   • Dyspnea   • Weakness   • UTI (urinary tract infection)   • Dysarthria due to recent cerebrovascular accident (CVA)   • Nocturnal hypoxia   • Centrilobular emphysema (CMS/HCC)   • Nodule of upper lobe of left lung   • Elevated LFTs   • Insomnia   • Chronic anticoagulation   • Dysarthria   • Cerebrovascular accident (CVA) due to thrombosis of vertebral artery (CMS/HCC)       Current Outpatient Medications on File Prior to Encounter   Medication Sig Dispense Refill   • albuterol sulfate  (90 Base) MCG/ACT inhaler Inhale 2 puffs Every 4 (Four) Hours As Needed for Wheezing. 18 g 5   • aspirin 81 MG EC tablet Take 81 mg by mouth Daily.     • atorvastatin (LIPITOR) 40 MG tablet Take 1 tablet by mouth Every Night. 30 tablet 0   • busPIRone (BUSPAR) 10 MG tablet Take 10 mg by mouth 2 (Two) Times a Day.     • Cranberry 500 MG capsule Take 1 capsule by mouth Daily.     • dilTIAZem CD (CARDIZEM CD) 360 MG 24 hr capsule Take 1 capsule by mouth Daily. 30 capsule 0   • furosemide (LASIX) 40 MG tablet Take 40 mg by mouth Daily As Needed.     • ipratropium-albuterol (DUO-NEB) 0.5-2.5 mg/3 ml nebulizer Take 3 mL by nebulization 4 (Four) Times a Day. 360 mL 1   • lisinopril (PRINIVIL,ZESTRIL) 5 MG tablet Take 1 tablet by mouth Daily. 30 tablet 0   • Magnesium 250 MG tablet Take 250 mg by mouth Daily.     • metoprolol succinate XL (TOPROL-XL) 100 MG 24 hr tablet Take 1 tablet by mouth Daily. 30 tablet 0   • rivaroxaban (XARELTO) 20 MG tablet Take 1 tablet by mouth Daily With Dinner. Indications: Atrial Fibrillation 30 tablet 0   • sennosides-docusate (PERICOLACE) 8.6-50 MG per tablet Take 2 tablets by mouth 2 (Two) Times a Day.     • tiotropium bromide-olodaterol (STIOLTO RESPIMAT) 2.5-2.5 MCG/ACT aerosol solution inhaler Inhale 2 puffs Daily. 2 inh once a day 3 inhaler 3   • traZODone (DESYREL) 50 MG tablet Take 50 mg by mouth At Night As Needed for Sleep.     • venlafaxine XR (EFFEXOR-XR) 150 MG 24 hr capsule Take  150 mg by mouth Daily.       No current facility-administered medications on file prior to encounter.     Allergies   Allergen Reactions   • Msg [Monosodium Glutamate] Shortness Of Breath and Arrhythmia     Reported by pt's son       Social History     Socioeconomic History   • Marital status:      Spouse name: Not on file   • Number of children: Not on file   • Years of education: Not on file   • Highest education level: Not on file   Tobacco Use   • Smoking status: Former Smoker     Quit date: 2020     Years since quittin.8   • Smokeless tobacco: Never Used   Substance and Sexual Activity   • Alcohol use: Yes     Comment: Rare   • Drug use: Defer   • Sexual activity: Defer     Family History   Problem Relation Age of Onset   • Diabetes Mother    • Hypertension Father             Objective:     Vital Sign Min/Max for last 24 hours  No data recorded   BP  Min: 146/90  Max: 146/90   Pulse  Min: 73  Max: 73   Resp  Min: 24  Max: 24   SpO2  Min: 97 %  Max: 97 %   No data recorded    No intake or output data in the 24 hours ending 21 0814        Vitals:    21 0749   BP: 146/90   Pulse: 73   Resp: 24   SpO2: 97%       CONSTITUTIONAL: Well-nourished. In no acute distress.   LUNGS: Normal effort. Clear to auscultation bilaterally without wheezing, rhonchi, or rales noted.   CARDIOVASCULAR: The heart has a irregularly irregular rate and rhythm with a normal S1 and S2. There is no murmur, gallop, rub, or click appreciated.   PERIPHERAL VASCULAR: Radial pulses are 2+ bilaterally. There is no lower extremity edema.   NEUROLOGICAL: Nonfocal.  PSYCHIATRIC: Alert, orientated x 3, appropriate affect and mood    Labs, results reviewed by myself:  Lab Results   Component Value Date    TROPONINT <0.010 2020       No results found for: GLUCOSE, BUN, CREATININE, NA, K, CL, CO2, CALCIUM, PROTEINTOT, ALBUMIN, ALT, AST, ALKPHOS, BILITOT, EGFRIFNONA, LABIL2, BCR, ANIONGAP    Lab Results   Component Value  Date    CHOL 101 12/19/2020    CHOL 85 06/24/2020    CHOL 128 05/24/2020     Lab Results   Component Value Date    TRIG 53 12/19/2020    TRIG 58 06/24/2020    TRIG 64 05/24/2020     Lab Results   Component Value Date    HDL 55 12/19/2020    HDL 29 (L) 06/24/2020    HDL 52 05/24/2020     Lab Results   Component Value Date    LDL 33 12/19/2020     No components found for: LDLDIRECTC      No results found for: WBC, RBC, HGB, HCT, MCV, MCH, MCHC, RDW, RDWSD, MPV, PLT, NEUTRORELPCT, LYMPHORELPCT, MONORELPCT, EOSRELPCT, BASORELPCT, AUTOIGPER, NEUTROABS, LYMPHSABS, MONOSABS, EOSABS, BASOSABS, AUTOIGNUM, NRBC      Diagnostic Data:      Results for orders placed during the hospital encounter of 02/09/21    Intra-Operative Transesophageal Echocardiogram    Interpretation Summary  · Left ventricular systolic function is normal. Estimated left ventricular EF = 55%  · Left ventricular wall thickness is consistent with mild concentric hypertrophy.  · The left atrial cavity is dilated.  · The left atrial appendage is status post closure with a well-seated 35 mm Watchman FLX device. There was no peridevice leak noted after deployment.  · The right atrial cavity is dilated.  · Mild mitral valve regurgitation is present.  · Mild tricuspid valve regurgitation is present.  · Estimated right ventricular systolic pressure from tricuspid regurgitation is mildly elevated (35-45 mmHg).  · There is mild plaque in the descending aorta present.      Tele: Atrial fibrillation         Assessment and Plan:   ASSESSMENT:  1. Permanent atrial fibrillation  a. [id number] of 6  b. Status post 35 mm watchman FL X device with Dr. Lugo on 2/9/2021.  2. Hypertension  3. History of CVA  a. Status post emergent thrombectomy 5/2020.  Patient had been on Eliquis.  b. Transitioned to Xarelto due to failure of Eliquis at that time.      PLAN:  1. Transesophageal echocardiogram today with Dr. Weir. The risks, benefits, and alternatives of the procedure have  been reviewed and the patient wishes to proceed.       Electronically signed by JAVY Shultz, 04/13/21, 8:14 AM EDT.

## 2021-04-15 ENCOUNTER — HOSPITAL ENCOUNTER (EMERGENCY)
Age: 66
LOS: 1 days | Discharge: HOME | End: 2021-04-16
Payer: MEDICARE

## 2021-04-15 VITALS
TEMPERATURE: 97.9 F | SYSTOLIC BLOOD PRESSURE: 164 MMHG | HEART RATE: 74 BPM | OXYGEN SATURATION: 90 % | DIASTOLIC BLOOD PRESSURE: 85 MMHG | RESPIRATION RATE: 24 BRPM

## 2021-04-15 VITALS
HEART RATE: 94 BPM | RESPIRATION RATE: 17 BRPM | DIASTOLIC BLOOD PRESSURE: 108 MMHG | SYSTOLIC BLOOD PRESSURE: 173 MMHG | OXYGEN SATURATION: 87 %

## 2021-04-15 VITALS — BODY MASS INDEX: 27.4 KG/M2

## 2021-04-15 DIAGNOSIS — K21.9: ICD-10-CM

## 2021-04-15 DIAGNOSIS — I48.91: ICD-10-CM

## 2021-04-15 DIAGNOSIS — S40.021A: Primary | ICD-10-CM

## 2021-04-15 DIAGNOSIS — R04.0: ICD-10-CM

## 2021-04-15 DIAGNOSIS — R91.1: ICD-10-CM

## 2021-04-15 DIAGNOSIS — Z86.73: ICD-10-CM

## 2021-04-15 DIAGNOSIS — Z79.899: ICD-10-CM

## 2021-04-15 DIAGNOSIS — J44.9: ICD-10-CM

## 2021-04-15 DIAGNOSIS — I10: ICD-10-CM

## 2021-04-15 DIAGNOSIS — I50.9: ICD-10-CM

## 2021-04-15 LAB
ANION GAP SERPL CALC-SCNC: 10.9 MEQ/L (ref 5–15)
BUN SERPL-MCNC: 26 MG/DL (ref 7–17)
CALCIUM SPEC-MCNC: 9.5 MG/DL (ref 8.4–10.2)
CHLORIDE SPEC-SCNC: 100 MMOL/L (ref 98–107)
CO2 SERPL-SCNC: 32 MMOL/L (ref 22–30)
CREAT BLD-SCNC: 1 MG/DL (ref 0.52–1.04)
CREATININE CLEARANCE ESTIMATED: 66 ML/MIN (ref 50–200)
D-DIMER: 0.74 UG/ML (ref 0–0.5)
ESTIMATED GLOMERULAR FILT RATE: 55 ML/MIN (ref 60–?)
GFR (AFRICAN AMERICAN): 67 ML/MIN (ref 60–?)
GLUCOSE: 118 MG/DL (ref 74–100)
HCT VFR BLD CALC: 40.2 % (ref 37–47)
HGB BLD-MCNC: 13.3 G/DL (ref 12.2–16.2)
MCHC RBC-ENTMCNC: 33.1 G/DL (ref 31.8–35.4)
MCV RBC: 95.1 FL (ref 81–99)
MEAN CORPUSCULAR HEMOGLOBIN: 31.5 PG (ref 27–31.2)
NT PRO BRAIN NATRIURETIC PEP.: 623 PG/ML (ref 0–125)
PLATELET # BLD: 276 K/MM3 (ref 142–424)
POTASSIUM: 3.9 MMOL/L (ref 3.5–5.1)
RBC # BLD AUTO: 4.23 M/MM3 (ref 4.2–5.4)
SODIUM SPEC-SCNC: 139 MMOL/L (ref 136–145)
TROPONIN I: < 0.01 NG/ML (ref 0–0.03)
WBC # BLD AUTO: 7.8 K/MM3 (ref 4.8–10.8)

## 2021-04-15 PROCEDURE — 93005 ELECTROCARDIOGRAM TRACING: CPT

## 2021-04-15 PROCEDURE — 99282 EMERGENCY DEPT VISIT SF MDM: CPT

## 2021-04-15 PROCEDURE — 84484 ASSAY OF TROPONIN QUANT: CPT

## 2021-04-15 PROCEDURE — 71045 X-RAY EXAM CHEST 1 VIEW: CPT

## 2021-04-15 PROCEDURE — 71275 CT ANGIOGRAPHY CHEST: CPT

## 2021-04-15 PROCEDURE — 83880 ASSAY OF NATRIURETIC PEPTIDE: CPT

## 2021-04-15 PROCEDURE — 80048 BASIC METABOLIC PNL TOTAL CA: CPT

## 2021-04-15 PROCEDURE — 85025 COMPLETE CBC W/AUTO DIFF WBC: CPT

## 2021-04-15 PROCEDURE — 85378 FIBRIN DEGRADE SEMIQUANT: CPT

## 2021-04-16 VITALS
SYSTOLIC BLOOD PRESSURE: 134 MMHG | DIASTOLIC BLOOD PRESSURE: 95 MMHG | RESPIRATION RATE: 20 BRPM | TEMPERATURE: 97.5 F | OXYGEN SATURATION: 94 % | HEART RATE: 74 BPM

## 2021-04-16 VITALS
HEART RATE: 75 BPM | OXYGEN SATURATION: 90 % | RESPIRATION RATE: 15 BRPM | DIASTOLIC BLOOD PRESSURE: 77 MMHG | SYSTOLIC BLOOD PRESSURE: 131 MMHG

## 2021-05-12 NOTE — PROGRESS NOTES
[facility] at [facility]   OFFICE NOTE      Lynda Thorne  1955  PCP: Otoniel Babin MD    SUBJECTIVE:   Lynda Thorne is a 65 y.o. female seen for a follow up visit regarding the following:     CC:Afib    HPI:   Pleasant 65 year old white female seen in follow up regarding Permanent Afib, HTN, and Watchman device. Since we last saw the pt, pt denies any symptomatic palpitations,, CP, LH, and dizziness. She has been to the ER for worsening SOB. A CTA revealed no PE, with severe COPD and worsening mediastinal and hilar adenopathy with bilateral effusions and concern for lymphoma. She is secluded to follow up with Hem/Onc and Pulmonary.      Cardiac PMH: (Old records have been reviewed and summarized below)  1. Permanent Atrial Fibrillation   a. CHADsVASc = 6  b. Insufficient database for hx of AFib  c. Presented to [facility] ED 5/23/2020 with CVA  d. Patient transitioned to Xarelto due to failed Eliquis therapy with CVA.  e. Rate control continued with metoprolol and Cardizem therapies  f. Echo 12/19/2020 EF 56-60%, no significant VHD, LA 4.1 cm  2. Essential Hypertension  3. Cerebrovascular accident  / CVA  a. Presented to ED via EMS 5/23/2020 with right hemiparesis and expressive aphasia. Patient ruled out for TPA and taken for emergent thrombectomy.    b. Patient transitioned to Xarelto due to failed Eliquis therapy  4. Anxiety /  Depression  5. Asthma     Past Medical History, Past Surgical History, Family history, Social History, and Medications were all reviewed with the patient today and updated as necessary.       Current Outpatient Medications:   •  albuterol sulfate  (90 Base) MCG/ACT inhaler, Inhale 2 puffs Every 4 (Four) Hours As Needed for Wheezing., Disp: 18 g, Rfl: 5  •  aspirin 81 MG EC tablet, Take 81 mg by mouth Daily., Disp: , Rfl:   •  atorvastatin (LIPITOR) 40 MG tablet, Take 1 tablet by mouth Every Night., Disp: 30 tablet, Rfl: 0  •  busPIRone (BUSPAR)  10 MG tablet, Take 10 mg by mouth 2 (Two) Times a Day., Disp: , Rfl:   •  Cranberry 500 MG capsule, Take 1 capsule by mouth Daily., Disp: , Rfl:   •  dilTIAZem CD (CARDIZEM CD) 360 MG 24 hr capsule, Take 1 capsule by mouth Daily., Disp: 30 capsule, Rfl: 0  •  furosemide (LASIX) 40 MG tablet, Take 40 mg by mouth Daily As Needed., Disp: , Rfl:   •  ipratropium-albuterol (DUO-NEB) 0.5-2.5 mg/3 ml nebulizer, Take 3 mL by nebulization 4 (Four) Times a Day., Disp: 360 mL, Rfl: 1  •  lisinopril (PRINIVIL,ZESTRIL) 5 MG tablet, Take 1 tablet by mouth Daily., Disp: 30 tablet, Rfl: 0  •  Magnesium 250 MG tablet, Take 250 mg by mouth Daily., Disp: , Rfl:   •  metoprolol succinate XL (TOPROL-XL) 100 MG 24 hr tablet, Take 1 tablet by mouth Daily., Disp: 30 tablet, Rfl: 0  •  potassium chloride (K-TAB) 20 MEQ tablet controlled-release ER tablet, Take 1 tablet by mouth Daily As Needed (Take 20meq of potassium with each 40mg of furosemide)., Disp: 30 tablet, Rfl: 5  •  predniSONE (DELTASONE) 10 MG tablet, 40mg PO daily for 5 days, then 20mg PO daily for 5 days, then 10mg PO daily for 5 days, Disp: 35 tablet, Rfl: 0  •  rivaroxaban (XARELTO) 20 MG tablet, Take 1 tablet by mouth Daily With Dinner. Indications: Atrial Fibrillation, Disp: 30 tablet, Rfl: 0  •  sennosides-docusate (PERICOLACE) 8.6-50 MG per tablet, Take 2 tablets by mouth 2 (Two) Times a Day., Disp: , Rfl:   •  tiotropium bromide-olodaterol (STIOLTO RESPIMAT) 2.5-2.5 MCG/ACT aerosol solution inhaler, Inhale 2 puffs Daily. 2 inh once a day, Disp: 3 inhaler, Rfl: 3  •  traZODone (DESYREL) 50 MG tablet, Take 50 mg by mouth At Night As Needed for Sleep., Disp: , Rfl:   •  venlafaxine XR (EFFEXOR-XR) 150 MG 24 hr capsule, Take 150 mg by mouth Daily., Disp: , Rfl:       Allergies   Allergen Reactions   • Msg [Monosodium Glutamate] Shortness Of Breath and Arrhythmia     Reported by pt's son     Patient Active Problem List   Diagnosis   • Essential hypertension   • Paroxysmal  atrial fibrillation (CMS/HCC)   • Recent MCA CVA while on Eliquis, s/p mechanical thrombectomy 20   • Dyspnea   • Weakness   • UTI (urinary tract infection)   • Dysarthria due to recent cerebrovascular accident (CVA)   • Nocturnal hypoxia   • Centrilobular emphysema (CMS/HCC)   • Nodule of upper lobe of left lung   • Elevated LFTs   • Insomnia   • Chronic anticoagulation   • Dysarthria   • Cerebrovascular accident (CVA) due to thrombosis of vertebral artery (CMS/HCC)     Past Medical History:   Diagnosis Date   • Arthritis    • Asthma    • Depression with anxiety    • Hypertension    • Stroke (CMS/HCC)      Past Surgical History:   Procedure Laterality Date   • ARM THROMBECTOMY/EMBOLECTOMY     • ATRIAL APPENDAGE EXCLUSION LEFT WITH TRANSESOPHAGEAL ECHOCARDIOGRAM N/A 2021    Procedure: Atrial Appendage Occlusion (Watchman), hold Xarelto 1 dose;  Surgeon: Earnest Lugo DO;  Location:  [facility];  Service: Cardiology;  Laterality: N/A;   • INTERVENTIONAL RADIOLOGY PROCEDURE Bilateral 2020    Procedure: CAROTID CEREBRAL ANGIOGRAM BILATERAL;  Surgeon: Jayden Beck MD;  Location: [facility];  Service: Interventional Radiology;  Laterality: Bilateral;     Family History   Problem Relation Age of Onset   • Diabetes Mother    • Hypertension Father      Social History     Tobacco Use   • Smoking status: Former Smoker     Quit date: 2020     Years since quittin.9   • Smokeless tobacco: Never Used   Substance Use Topics   • Alcohol use: Yes     Comment: Rare       ROS:  Review of Symptoms:  General: no recent weight loss/gain, weakness or fatigue  Skin: no rashes, lumps, or other skin changes  HEENT: no dizziness, lightheadedness, or vision changes  Respiratory: no cough or hemoptysis  Cardiovascular: no palpitations, and tachycardia  Gastrointestinal: no black/tarry stools or diarrhea  Urinary: no change in frequency or urgency  Peripheral Vascular: no  claudication or leg cramps  Musculoskeletal: no muscle or joint pain/stiffness  Psychiatric: no depression or excessive stress  Neurological: no sensory or motor loss, no syncope  Hematologic: no anemia, easy bruising or bleeding  Endocrine: no thyroid problems, nor heat or cold intolerance    PHYSICAL EXAM:    LMP  (LMP Unknown)        Wt Readings from Last 5 Encounters:   04/30/21 77.1 kg (170 lb)   04/13/21 77.1 kg (170 lb)   02/09/21 78.2 kg (172 lb 6.4 oz)   02/09/21 77.1 kg (170 lb)   12/19/20 77.1 kg (170 lb)       BP Readings from Last 5 Encounters:   04/30/21 128/92   04/13/21 127/79   02/10/21 144/78   12/21/20 125/95   11/20/20 140/90       General appearance - Alert, well appearing, and in no distress   Mental status - Affect appropriate to mood.  Eyes - Sclerae anicteric,  ENMT - Hearing grossly normal bilaterally, Dental hygiene good.  Neck - Carotids upstroke normal bilaterally, no bruits, no JVD.  Resp - Clear to auscultation, no wheezes, rales or rhonchi, symmetric air entry.  Heart - IRIR, normal S1, S2, no murmurs, rubs, clicks or gallops.  GI - Soft, nontender, nondistended, no masses or organomegaly.  Neurological - Grossly intact - normal speech, no focal findings  Musculoskeletal - No joint tenderness, deformity or swelling, no muscular tenderness noted.  Extremities - Peripheral pulses normal, no pedal edema, no clubbing or cyanosis.  Skin - Normal coloration and turgor.  Psych -  oriented to person, place, and time.    Medical problems and test results were reviewed with the patient today.     Recent Results (from the past 672 hour(s))   ECG 12 Lead    Collection Time: 04/30/21  5:07 PM   Result Value Ref Range    QT Interval 426 ms    QTC Interval 469 ms   Comprehensive Metabolic Panel    Collection Time: 04/30/21  5:36 PM    Specimen: Blood   Result Value Ref Range    Glucose 128 (H) 65 - 99 mg/dL    BUN 21 8 - 23 mg/dL    Creatinine 1.12 (H) 0.57 - 1.00 mg/dL    Sodium 140 136 - 145  mmol/L    Potassium 3.9 3.5 - 5.2 mmol/L    Chloride 100 98 - 107 mmol/L    CO2 32.0 (H) 22.0 - 29.0 mmol/L    Calcium 9.3 8.6 - 10.5 mg/dL    Total Protein 7.0 6.0 - 8.5 g/dL    Albumin 3.90 3.50 - 5.20 g/dL    ALT (SGPT) 11 1 - 33 U/L    AST (SGOT) 33 (H) 1 - 32 U/L    Alkaline Phosphatase 85 39 - 117 U/L    Total Bilirubin 0.3 0.0 - 1.2 mg/dL    eGFR Non African Amer 49 (L) >60 mL/min/1.73    Globulin 3.1 gm/dL    A/G Ratio 1.3 g/dL    BUN/Creatinine Ratio 18.8 7.0 - 25.0    Anion Gap 8.0 5.0 - 15.0 mmol/L   BNP    Collection Time: 04/30/21  5:36 PM    Specimen: Blood   Result Value Ref Range    proBNP 1,195.0 (H) 0.0 - 900.0 pg/mL   Troponin    Collection Time: 04/30/21  5:36 PM    Specimen: Blood   Result Value Ref Range    Troponin T <0.010 0.000 - 0.030 ng/mL   Light Blue Top    Collection Time: 04/30/21  5:36 PM   Result Value Ref Range    Extra Tube hold for add-on    Green Top (Gel)    Collection Time: 04/30/21  5:36 PM   Result Value Ref Range    Extra Tube Hold for add-ons.    Lavender Top    Collection Time: 04/30/21  5:36 PM   Result Value Ref Range    Extra Tube hold for add-on    Gold Top - SST    Collection Time: 04/30/21  5:36 PM   Result Value Ref Range    Extra Tube Hold for add-ons.    Gray Top    Collection Time: 04/30/21  5:36 PM   Result Value Ref Range    Extra Tube Hold for add-ons.    CBC Auto Differential    Collection Time: 04/30/21  5:36 PM    Specimen: Blood   Result Value Ref Range    WBC 8.64 3.40 - 10.80 10*3/mm3    RBC 4.01 3.77 - 5.28 10*6/mm3    Hemoglobin 12.8 12.0 - 15.9 g/dL    Hematocrit 39.6 34.0 - 46.6 %    MCV 98.8 (H) 79.0 - 97.0 fL    MCH 31.9 26.6 - 33.0 pg    MCHC 32.3 31.5 - 35.7 g/dL    RDW 13.3 12.3 - 15.4 %    RDW-SD 48.3 37.0 - 54.0 fl    MPV 9.8 6.0 - 12.0 fL    Platelets 250 140 - 450 10*3/mm3    Neutrophil % 74.3 42.7 - 76.0 %    Lymphocyte % 12.5 (L) 19.6 - 45.3 %    Monocyte % 8.6 5.0 - 12.0 %    Eosinophil % 3.9 0.3 - 6.2 %    Basophil % 0.5 0.0 - 1.5 %     Immature Grans % 0.2 0.0 - 0.5 %    Neutrophils, Absolute 6.42 1.70 - 7.00 10*3/mm3    Lymphocytes, Absolute 1.08 0.70 - 3.10 10*3/mm3    Monocytes, Absolute 0.74 0.10 - 0.90 10*3/mm3    Eosinophils, Absolute 0.34 0.00 - 0.40 10*3/mm3    Basophils, Absolute 0.04 0.00 - 0.20 10*3/mm3    Immature Grans, Absolute 0.02 0.00 - 0.05 10*3/mm3    nRBC 0.0 0.0 - 0.2 /100 WBC   COVID-19 and FLU A/B PCR - Swab, Nasopharynx    Collection Time: 04/30/21  5:46 PM    Specimen: Nasopharynx; Swab   Result Value Ref Range    COVID19 Not Detected Not Detected - Ref. Range    Influenza A PCR Not Detected Not Detected    Influenza B PCR Not Detected Not Detected   Blood Gas, Arterial With Co-Ox    Collection Time: 04/30/21  5:53 PM    Specimen: Arterial Blood   Result Value Ref Range    Site Left Radial     Titi's Test N/A     pH, Arterial 7.480 (H) 7.350 - 7.450 pH units    pCO2, Arterial 43.1 35.0 - 45.0 mm Hg    pO2, Arterial 59.9 (L) 83.0 - 108.0 mm Hg    HCO3, Arterial 32.1 (H) 20.0 - 26.0 mmol/L    Base Excess, Arterial 7.7 (H) 0.0 - 2.0 mmol/L    Hemoglobin, Blood Gas 13.2 (L) 14 - 18 g/dL    Hematocrit, Blood Gas 40.6 %    Oxyhemoglobin 90.3 (L) 94 - 99 %    Methemoglobin 0.40 0.00 - 1.50 %    Carboxyhemoglobin 1.1 0 - 2 %    CO2 Content 33.4 (H) 22 - 33 mmol/L    Temperature 37.0 C    Barometric Pressure for Blood Gas      Modality Nasal Cannula     FIO2 28 %    Ventilator Mode       Note      pH, Temp Corrected 7.480 pH Units    pCO2, Temperature Corrected 43.1 35 - 45 mm Hg    pO2, Temperature Corrected 59.9 (L) 83 - 108 mm Hg         ASSESSMENT   1. Permanent Afib: Lilia controlled on CCB, BB.   2. HTN: Controlled.   3. CVA: emergent Thrombectomy 5/2020. Eliquis changed to Xarelto .   4. COPD, Lymphopathy possible Lymphoma with plan for continued evaluation with Pulm, and Hem/Onc    PLAN  · Continued current medical therapy  · Follow up 3 months or sooner as needed.     5/12/2021  09:38 EDT  Will Po JEFFERS

## 2021-05-25 ENCOUNTER — HOSPITAL ENCOUNTER (OUTPATIENT)
Age: 66
End: 2021-05-25
Payer: MEDICARE

## 2021-05-25 DIAGNOSIS — Z12.31: Primary | ICD-10-CM

## 2021-05-25 DIAGNOSIS — N64.9: ICD-10-CM

## 2021-05-25 PROCEDURE — 76641 ULTRASOUND BREAST COMPLETE: CPT

## 2021-05-25 PROCEDURE — 77063 BREAST TOMOSYNTHESIS BI: CPT

## 2021-05-25 PROCEDURE — 77067 SCR MAMMO BI INCL CAD: CPT

## 2021-05-26 ENCOUNTER — HOSPITAL ENCOUNTER (EMERGENCY)
Age: 66
Discharge: HOME | End: 2021-05-26
Payer: MEDICARE

## 2021-05-26 VITALS
RESPIRATION RATE: 20 BRPM | DIASTOLIC BLOOD PRESSURE: 88 MMHG | HEART RATE: 97 BPM | OXYGEN SATURATION: 94 % | SYSTOLIC BLOOD PRESSURE: 140 MMHG

## 2021-05-26 VITALS
SYSTOLIC BLOOD PRESSURE: 141 MMHG | RESPIRATION RATE: 22 BRPM | DIASTOLIC BLOOD PRESSURE: 83 MMHG | OXYGEN SATURATION: 96 % | HEART RATE: 105 BPM

## 2021-05-26 VITALS
DIASTOLIC BLOOD PRESSURE: 89 MMHG | OXYGEN SATURATION: 97 % | SYSTOLIC BLOOD PRESSURE: 107 MMHG | HEART RATE: 112 BPM | RESPIRATION RATE: 22 BRPM

## 2021-05-26 VITALS — OXYGEN SATURATION: 98 % | HEART RATE: 103 BPM | SYSTOLIC BLOOD PRESSURE: 139 MMHG | DIASTOLIC BLOOD PRESSURE: 104 MMHG

## 2021-05-26 VITALS
OXYGEN SATURATION: 97 % | RESPIRATION RATE: 22 BRPM | SYSTOLIC BLOOD PRESSURE: 137 MMHG | DIASTOLIC BLOOD PRESSURE: 96 MMHG | HEART RATE: 98 BPM

## 2021-05-26 VITALS
HEART RATE: 103 BPM | SYSTOLIC BLOOD PRESSURE: 141 MMHG | RESPIRATION RATE: 22 BRPM | OXYGEN SATURATION: 95 % | DIASTOLIC BLOOD PRESSURE: 86 MMHG

## 2021-05-26 VITALS
OXYGEN SATURATION: 98 % | HEART RATE: 101 BPM | SYSTOLIC BLOOD PRESSURE: 142 MMHG | DIASTOLIC BLOOD PRESSURE: 93 MMHG | RESPIRATION RATE: 32 BRPM

## 2021-05-26 VITALS
HEART RATE: 95 BPM | OXYGEN SATURATION: 98 % | RESPIRATION RATE: 22 BRPM | DIASTOLIC BLOOD PRESSURE: 98 MMHG | SYSTOLIC BLOOD PRESSURE: 136 MMHG

## 2021-05-26 VITALS
HEART RATE: 98 BPM | RESPIRATION RATE: 19 BRPM | TEMPERATURE: 97.8 F | DIASTOLIC BLOOD PRESSURE: 76 MMHG | OXYGEN SATURATION: 95 % | SYSTOLIC BLOOD PRESSURE: 153 MMHG

## 2021-05-26 VITALS
HEART RATE: 104 BPM | TEMPERATURE: 97.34 F | RESPIRATION RATE: 24 BRPM | OXYGEN SATURATION: 97 % | SYSTOLIC BLOOD PRESSURE: 150 MMHG | DIASTOLIC BLOOD PRESSURE: 102 MMHG

## 2021-05-26 VITALS — BODY MASS INDEX: 24.2 KG/M2

## 2021-05-26 VITALS — DIASTOLIC BLOOD PRESSURE: 110 MMHG | SYSTOLIC BLOOD PRESSURE: 151 MMHG | HEART RATE: 123 BPM | RESPIRATION RATE: 32 BRPM

## 2021-05-26 DIAGNOSIS — Z86.73: ICD-10-CM

## 2021-05-26 DIAGNOSIS — K21.9: ICD-10-CM

## 2021-05-26 DIAGNOSIS — I48.91: Primary | ICD-10-CM

## 2021-05-26 DIAGNOSIS — Z79.899: ICD-10-CM

## 2021-05-26 DIAGNOSIS — J44.9: ICD-10-CM

## 2021-05-26 DIAGNOSIS — I10: ICD-10-CM

## 2021-05-26 LAB
ALBUMIN LEVEL: 4.1 G/DL (ref 3.5–5)
ALBUMIN/GLOB SERPL: 1.1 {RATIO} (ref 1.1–1.8)
ALP ISO SERPL-ACNC: 88 U/L (ref 38–126)
ALT SERPLBLD-CCNC: 20 U/L (ref 12–78)
ANION GAP SERPL CALC-SCNC: 7.4 MEQ/L (ref 5–15)
AST SERPL QL: 44 U/L (ref 14–36)
BILIRUBIN,TOTAL: 0.7 MG/DL (ref 0.2–1.3)
BUN SERPL-MCNC: 22 MG/DL (ref 7–17)
CALCIUM SPEC-MCNC: 9.6 MG/DL (ref 8.4–10.2)
CHLORIDE SPEC-SCNC: 101 MMOL/L (ref 98–107)
CO2 SERPL-SCNC: 36 MMOL/L (ref 22–30)
CREAT BLD-SCNC: 0.8 MG/DL (ref 0.52–1.04)
CREATININE CLEARANCE ESTIMATED: 59 ML/MIN (ref 50–200)
ESTIMATED GLOMERULAR FILT RATE: 72 ML/MIN (ref 60–?)
GFR (AFRICAN AMERICAN): 87 ML/MIN (ref 60–?)
GLOBULIN SER CALC-MCNC: 3.6 G/DL (ref 1.3–3.2)
GLUCOSE: 118 MG/DL (ref 74–100)
HCT VFR BLD CALC: 42.2 % (ref 37–47)
HGB BLD-MCNC: 13.9 G/DL (ref 12.2–16.2)
MCHC RBC-ENTMCNC: 32.9 G/DL (ref 31.8–35.4)
MCV RBC: 95.6 FL (ref 81–99)
MEAN CORPUSCULAR HEMOGLOBIN: 31.5 PG (ref 27–31.2)
NT PRO BRAIN NATRIURETIC PEP.: 739 PG/ML (ref 0–125)
PLATELET # BLD: 291 K/MM3 (ref 142–424)
POTASSIUM: 4.4 MMOL/L (ref 3.5–5.1)
PROT SERPL-MCNC: 7.7 G/DL (ref 6.3–8.2)
RBC # BLD AUTO: 4.42 M/MM3 (ref 4.2–5.4)
SODIUM SPEC-SCNC: 140 MMOL/L (ref 136–145)
TROPONIN I: < 0.01 NG/ML (ref 0–0.03)
WBC # BLD AUTO: 8.7 K/MM3 (ref 4.8–10.8)

## 2021-05-26 PROCEDURE — 87040 BLOOD CULTURE FOR BACTERIA: CPT

## 2021-05-26 PROCEDURE — 71045 X-RAY EXAM CHEST 1 VIEW: CPT

## 2021-05-26 PROCEDURE — 83880 ASSAY OF NATRIURETIC PEPTIDE: CPT

## 2021-05-26 PROCEDURE — 99283 EMERGENCY DEPT VISIT LOW MDM: CPT

## 2021-05-26 PROCEDURE — 96374 THER/PROPH/DIAG INJ IV PUSH: CPT

## 2021-05-26 PROCEDURE — 36415 COLL VENOUS BLD VENIPUNCTURE: CPT

## 2021-05-26 PROCEDURE — 80053 COMPREHEN METABOLIC PANEL: CPT

## 2021-05-26 PROCEDURE — 84484 ASSAY OF TROPONIN QUANT: CPT

## 2021-05-26 PROCEDURE — U0003 INFECTIOUS AGENT DETECTION BY NUCLEIC ACID (DNA OR RNA); SEVERE ACUTE RESPIRATORY SYNDROME CORONAVIRUS 2 (SARS-COV-2) (CORONAVIRUS DISEASE [COVID-19]), AMPLIFIED PROBE TECHNIQUE, MAKING USE OF HIGH THROUGHPUT TECHNOLOGIES AS DESCRIBED BY CMS-2020-01-R: HCPCS

## 2021-05-26 PROCEDURE — 93005 ELECTROCARDIOGRAM TRACING: CPT

## 2021-05-26 PROCEDURE — 85025 COMPLETE CBC W/AUTO DIFF WBC: CPT

## 2021-05-28 ENCOUNTER — HOSPITAL ENCOUNTER (OUTPATIENT)
Age: 66
End: 2021-05-28
Payer: MEDICARE

## 2021-05-28 DIAGNOSIS — R06.02: Primary | ICD-10-CM

## 2021-05-28 PROCEDURE — 93971 EXTREMITY STUDY: CPT

## 2021-06-02 ENCOUNTER — HOSPITAL ENCOUNTER (EMERGENCY)
Age: 66
Discharge: HOSPICE | End: 2021-06-02
Payer: MEDICARE

## 2021-06-02 VITALS
RESPIRATION RATE: 24 BRPM | TEMPERATURE: 98.3 F | SYSTOLIC BLOOD PRESSURE: 91 MMHG | HEART RATE: 69 BPM | OXYGEN SATURATION: 96 % | DIASTOLIC BLOOD PRESSURE: 70 MMHG

## 2021-06-02 VITALS
OXYGEN SATURATION: 95 % | DIASTOLIC BLOOD PRESSURE: 61 MMHG | RESPIRATION RATE: 23 BRPM | TEMPERATURE: 98.1 F | HEART RATE: 68 BPM | SYSTOLIC BLOOD PRESSURE: 101 MMHG

## 2021-06-02 VITALS
SYSTOLIC BLOOD PRESSURE: 121 MMHG | HEART RATE: 93 BPM | OXYGEN SATURATION: 96 % | DIASTOLIC BLOOD PRESSURE: 83 MMHG | RESPIRATION RATE: 26 BRPM

## 2021-06-02 VITALS
OXYGEN SATURATION: 93 % | SYSTOLIC BLOOD PRESSURE: 110 MMHG | DIASTOLIC BLOOD PRESSURE: 71 MMHG | RESPIRATION RATE: 19 BRPM | HEART RATE: 74 BPM

## 2021-06-02 VITALS
SYSTOLIC BLOOD PRESSURE: 101 MMHG | HEART RATE: 70 BPM | DIASTOLIC BLOOD PRESSURE: 61 MMHG | RESPIRATION RATE: 18 BRPM | OXYGEN SATURATION: 91 %

## 2021-06-02 VITALS
RESPIRATION RATE: 18 BRPM | HEART RATE: 77 BPM | OXYGEN SATURATION: 94 % | SYSTOLIC BLOOD PRESSURE: 119 MMHG | DIASTOLIC BLOOD PRESSURE: 76 MMHG

## 2021-06-02 VITALS
OXYGEN SATURATION: 95 % | HEART RATE: 71 BPM | SYSTOLIC BLOOD PRESSURE: 95 MMHG | RESPIRATION RATE: 14 BRPM | DIASTOLIC BLOOD PRESSURE: 60 MMHG

## 2021-06-02 VITALS
OXYGEN SATURATION: 98 % | SYSTOLIC BLOOD PRESSURE: 121 MMHG | TEMPERATURE: 98.06 F | RESPIRATION RATE: 16 BRPM | HEART RATE: 78 BPM | DIASTOLIC BLOOD PRESSURE: 63 MMHG

## 2021-06-02 VITALS
OXYGEN SATURATION: 94 % | SYSTOLIC BLOOD PRESSURE: 120 MMHG | RESPIRATION RATE: 18 BRPM | DIASTOLIC BLOOD PRESSURE: 60 MMHG | HEART RATE: 73 BPM

## 2021-06-02 VITALS
DIASTOLIC BLOOD PRESSURE: 93 MMHG | HEART RATE: 71 BPM | OXYGEN SATURATION: 94 % | RESPIRATION RATE: 22 BRPM | SYSTOLIC BLOOD PRESSURE: 141 MMHG

## 2021-06-02 VITALS
SYSTOLIC BLOOD PRESSURE: 121 MMHG | DIASTOLIC BLOOD PRESSURE: 65 MMHG | RESPIRATION RATE: 18 BRPM | HEART RATE: 80 BPM | OXYGEN SATURATION: 90 %

## 2021-06-02 VITALS — OXYGEN SATURATION: 96 % | HEART RATE: 79 BPM | RESPIRATION RATE: 23 BRPM

## 2021-06-02 VITALS
DIASTOLIC BLOOD PRESSURE: 69 MMHG | RESPIRATION RATE: 18 BRPM | HEART RATE: 71 BPM | OXYGEN SATURATION: 94 % | SYSTOLIC BLOOD PRESSURE: 115 MMHG

## 2021-06-02 VITALS
SYSTOLIC BLOOD PRESSURE: 119 MMHG | OXYGEN SATURATION: 93 % | DIASTOLIC BLOOD PRESSURE: 76 MMHG | RESPIRATION RATE: 23 BRPM | HEART RATE: 75 BPM

## 2021-06-02 VITALS
DIASTOLIC BLOOD PRESSURE: 86 MMHG | RESPIRATION RATE: 26 BRPM | SYSTOLIC BLOOD PRESSURE: 109 MMHG | HEART RATE: 73 BPM | OXYGEN SATURATION: 95 %

## 2021-06-02 VITALS — BODY MASS INDEX: 25.8 KG/M2

## 2021-06-02 DIAGNOSIS — Z79.899: ICD-10-CM

## 2021-06-02 DIAGNOSIS — Z87.891: ICD-10-CM

## 2021-06-02 DIAGNOSIS — I10: ICD-10-CM

## 2021-06-02 DIAGNOSIS — I95.9: Primary | ICD-10-CM

## 2021-06-02 DIAGNOSIS — I48.91: ICD-10-CM

## 2021-06-02 DIAGNOSIS — J44.9: ICD-10-CM

## 2021-06-02 DIAGNOSIS — Z86.73: ICD-10-CM

## 2021-06-02 DIAGNOSIS — K21.9: ICD-10-CM

## 2021-06-02 LAB
ANION GAP SERPL CALC-SCNC: 10 MEQ/L (ref 5–15)
BUN SERPL-MCNC: 33 MG/DL (ref 7–17)
CALCIUM SPEC-MCNC: 9.1 MG/DL (ref 8.4–10.2)
CHLORIDE SPEC-SCNC: 94 MMOL/L (ref 98–107)
CO2 SERPL-SCNC: 39 MMOL/L (ref 22–30)
CREAT BLD-SCNC: 1.2 MG/DL (ref 0.52–1.04)
CREATININE CLEARANCE ESTIMATED: 52 ML/MIN (ref 50–200)
ESTIMATED GLOMERULAR FILT RATE: 45 ML/MIN (ref 60–?)
GFR (AFRICAN AMERICAN): 54 ML/MIN (ref 60–?)
GLUCOSE: 116 MG/DL (ref 74–100)
HCT VFR BLD CALC: 39 % (ref 37–47)
HGB BLD-MCNC: 12.8 G/DL (ref 12.2–16.2)
MCHC RBC-ENTMCNC: 32.7 G/DL (ref 31.8–35.4)
MCV RBC: 96.2 FL (ref 81–99)
MEAN CORPUSCULAR HEMOGLOBIN: 31.5 PG (ref 27–31.2)
NT PRO BRAIN NATRIURETIC PEP.: 885 PG/ML (ref 0–125)
PLATELET # BLD: 320 K/MM3 (ref 142–424)
POTASSIUM: 5 MMOL/L (ref 3.5–5.1)
RBC # BLD AUTO: 4.05 M/MM3 (ref 4.2–5.4)
SODIUM SPEC-SCNC: 138 MMOL/L (ref 136–145)
TROPONIN I: < 0.01 NG/ML (ref 0–0.03)
TROPONIN I: < 0.01 NG/ML (ref 0–0.03)
WBC # BLD AUTO: 9 K/MM3 (ref 4.8–10.8)

## 2021-06-02 PROCEDURE — 83880 ASSAY OF NATRIURETIC PEPTIDE: CPT

## 2021-06-02 PROCEDURE — 85025 COMPLETE CBC W/AUTO DIFF WBC: CPT

## 2021-06-02 PROCEDURE — 84484 ASSAY OF TROPONIN QUANT: CPT

## 2021-06-02 PROCEDURE — 87040 BLOOD CULTURE FOR BACTERIA: CPT

## 2021-06-02 PROCEDURE — 71045 X-RAY EXAM CHEST 1 VIEW: CPT

## 2021-06-02 PROCEDURE — 80048 BASIC METABOLIC PNL TOTAL CA: CPT

## 2021-06-02 PROCEDURE — 99283 EMERGENCY DEPT VISIT LOW MDM: CPT

## 2021-06-02 PROCEDURE — 83605 ASSAY OF LACTIC ACID: CPT

## 2021-06-02 PROCEDURE — 96365 THER/PROPH/DIAG IV INF INIT: CPT

## 2021-06-02 PROCEDURE — 93005 ELECTROCARDIOGRAM TRACING: CPT

## (undated) DEVICE — STPCK LP 1WY RA 200PSI

## (undated) DEVICE — ST EXT IV LG BORE NDLESS FLTR LL 17IN

## (undated) DEVICE — LEX ELECTRO PHYSIOLOGY: Brand: MEDLINE INDUSTRIES, INC.

## (undated) DEVICE — TUBING, SUCTION, 1/4" X 10', STRAIGHT: Brand: MEDLINE

## (undated) DEVICE — INTRO SHEATH FAST/CATH LG/LUM 13F .038IN 12CM

## (undated) DEVICE — Device: Brand: MEDEX

## (undated) DEVICE — RADIFOCUS GLIDEWIRE: Brand: GLIDEWIRE

## (undated) DEVICE — CATH DIAG EXPO .052 PIG145 6F 110CM

## (undated) DEVICE — KT VLV HEMO MAP ACC PLS LG/BORE MTL/INTRO W/TORQ/DEV

## (undated) DEVICE — SYS CLS VASC/VENI VASCADE MVP 6TO12F

## (undated) DEVICE — BALLOON GUIDE CATHETER: Brand: FLOWGATE2

## (undated) DEVICE — SYS TRNSEP ACC ACROSS ADLT BRK1 71CM

## (undated) DEVICE — KT MANIFLD EP

## (undated) DEVICE — TREVO XP PROVUE RETRIEVER: Brand: TREVO XP

## (undated) DEVICE — SET PRIMARY GRVTY 10DP MALE LL 104IN

## (undated) DEVICE — LEX NEURO ANGIOGRAPHY: Brand: MEDLINE INDUSTRIES, INC.

## (undated) DEVICE — ST ACC MICROPUNCTURE .018 TRANSLSS/SS/TP 5F/10CM 21G/7CM

## (undated) DEVICE — ANGIO-SEAL VIP VASCULAR CLOSURE DEVICE: Brand: ANGIO-SEAL

## (undated) DEVICE — INTRO SHEATH ENGAGE W/50 GW .038 8F12

## (undated) DEVICE — INTRO SHEATH DRYSEAL FLEX 16F 5.3TO6.1MM 33CM

## (undated) DEVICE — ROTATING HEMOSTATIC VALVE .096": Brand: RHV

## (undated) DEVICE — Device

## (undated) DEVICE — GUIDEWIRE WITH ICE™ HYDROPHILIC COATING: Brand: TRANSEND™ EX

## (undated) DEVICE — CATH TEMPO 5F BER 100CM: Brand: TEMPO

## (undated) DEVICE — ACCESS SHEATH WITH DILATOR: Brand: WATCHMAN™ TRUSEAL™ ACCESS SYSTEM

## (undated) DEVICE — INTRO SHEATH TRNSEP .032 SL0 8.5F 63CM

## (undated) DEVICE — STPCK 3/WY HP M/RA W/OFF/HNDL 1050PSI STRL

## (undated) DEVICE — CATH MIC ORION21 2.4X2.6F .021IN 150CM

## (undated) DEVICE — CONTRL CONTRST CHMBRD W/TBG72IN REUS

## (undated) DEVICE — SKIN PREP TRAY W/CHG: Brand: MEDLINE INDUSTRIES, INC.

## (undated) DEVICE — GW TRNSEP SAFESEPT LT/ATRIAL RO 135CM .014IN

## (undated) DEVICE — DOME MONITORING W BONDED STPCK BIOTRANS2

## (undated) DEVICE — DECANT BG O JET

## (undated) DEVICE — INTRO HEMO ULTIMUM EV .035 12F 12CM

## (undated) DEVICE — ST INF PRI SMRTSTE 20DRP 2VLV 24ML 117

## (undated) DEVICE — ST EXT IV SMARTSITE 2VLV SP M LL 5ML IV1

## (undated) DEVICE — DRSNG SURESITE123 4X4.8IN